# Patient Record
Sex: FEMALE | Race: WHITE | Employment: OTHER | URBAN - METROPOLITAN AREA
[De-identification: names, ages, dates, MRNs, and addresses within clinical notes are randomized per-mention and may not be internally consistent; named-entity substitution may affect disease eponyms.]

---

## 2021-08-12 PROBLEM — E55.9 VITAMIN D DEFICIENCY: Status: ACTIVE | Noted: 2021-08-12

## 2021-08-12 PROBLEM — E78.2 MIXED HYPERLIPIDEMIA: Status: ACTIVE | Noted: 2021-08-12

## 2021-08-12 PROBLEM — N39.0 RECURRENT UTI: Status: ACTIVE | Noted: 2021-08-12

## 2021-08-12 PROBLEM — E11.9 CONTROLLED TYPE 2 DIABETES MELLITUS WITHOUT COMPLICATION, WITHOUT LONG-TERM CURRENT USE OF INSULIN (HCC): Status: ACTIVE | Noted: 2021-08-12

## 2021-08-12 PROBLEM — K59.04 CHRONIC IDIOPATHIC CONSTIPATION: Status: ACTIVE | Noted: 2021-08-12

## 2021-08-12 PROBLEM — Z79.899 ENCOUNTER FOR LONG-TERM (CURRENT) USE OF MEDICATIONS: Status: ACTIVE | Noted: 2021-08-12

## 2021-10-05 PROBLEM — N65.1 BREAST ASYMMETRY FOLLOWING RECONSTRUCTIVE SURGERY: Status: ACTIVE | Noted: 2020-06-25

## 2021-10-05 PROBLEM — M51.26 DISPLACEMENT OF LUMBAR INTERVERTEBRAL DISC WITHOUT MYELOPATHY: Status: ACTIVE | Noted: 2021-10-05

## 2021-10-05 PROBLEM — Z98.890 HISTORY OF REDUCTION SURGERY OF LEFT BREAST: Status: ACTIVE | Noted: 2020-06-25

## 2021-10-05 PROBLEM — Z98.890 HISTORY OF LUMPECTOMY OF RIGHT BREAST: Status: ACTIVE | Noted: 2020-06-25

## 2021-10-05 PROBLEM — M51.26 DISC DISPLACEMENT, LUMBAR: Status: ACTIVE | Noted: 2021-10-05

## 2021-10-05 PROBLEM — N28.1 RENAL CYST: Status: ACTIVE | Noted: 2017-01-05

## 2022-01-06 PROBLEM — F51.01 PRIMARY INSOMNIA: Status: ACTIVE | Noted: 2022-01-06

## 2022-01-06 PROBLEM — I10 ESSENTIAL HYPERTENSION: Status: ACTIVE | Noted: 2022-01-06

## 2022-01-06 PROBLEM — K59.04 CHRONIC IDIOPATHIC CONSTIPATION: Status: RESOLVED | Noted: 2021-08-12 | Resolved: 2022-01-06

## 2022-01-06 PROBLEM — R74.01 ELEVATED ALT MEASUREMENT: Status: ACTIVE | Noted: 2022-01-06

## 2022-03-18 PROBLEM — F51.01 PRIMARY INSOMNIA: Status: ACTIVE | Noted: 2022-01-06

## 2022-03-18 PROBLEM — M51.26 DISC DISPLACEMENT, LUMBAR: Status: ACTIVE | Noted: 2021-10-05

## 2022-03-19 PROBLEM — R74.01 ELEVATED AST (SGOT): Status: ACTIVE | Noted: 2022-01-06

## 2022-03-19 PROBLEM — E78.2 MIXED HYPERLIPIDEMIA: Status: ACTIVE | Noted: 2021-08-12

## 2022-03-19 PROBLEM — M51.26 DISPLACEMENT OF LUMBAR INTERVERTEBRAL DISC WITHOUT MYELOPATHY: Status: ACTIVE | Noted: 2021-10-05

## 2022-03-19 PROBLEM — I10 ESSENTIAL HYPERTENSION: Status: ACTIVE | Noted: 2022-01-06

## 2022-03-19 PROBLEM — Z79.899 ENCOUNTER FOR LONG-TERM (CURRENT) USE OF MEDICATIONS: Status: ACTIVE | Noted: 2021-08-12

## 2022-03-19 PROBLEM — E11.9 CONTROLLED TYPE 2 DIABETES MELLITUS WITHOUT COMPLICATION, WITHOUT LONG-TERM CURRENT USE OF INSULIN (HCC): Status: ACTIVE | Noted: 2021-08-12

## 2022-03-19 PROBLEM — Z98.890 HISTORY OF REDUCTION SURGERY OF LEFT BREAST: Status: ACTIVE | Noted: 2020-06-25

## 2022-03-19 PROBLEM — N39.0 RECURRENT UTI: Status: ACTIVE | Noted: 2021-08-12

## 2022-03-19 PROBLEM — N28.1 RENAL CYST: Status: ACTIVE | Noted: 2017-01-05

## 2022-03-19 PROBLEM — N65.1 BREAST ASYMMETRY FOLLOWING RECONSTRUCTIVE SURGERY: Status: ACTIVE | Noted: 2020-06-25

## 2022-03-19 PROBLEM — E55.9 VITAMIN D DEFICIENCY: Status: ACTIVE | Noted: 2021-08-12

## 2022-03-19 PROBLEM — Z98.890 HISTORY OF LUMPECTOMY OF RIGHT BREAST: Status: ACTIVE | Noted: 2020-06-25

## 2022-03-29 PROBLEM — M25.511 CHRONIC RIGHT SHOULDER PAIN: Status: ACTIVE | Noted: 2022-03-29

## 2022-03-29 PROBLEM — Z90.10 HISTORY OF MASTECTOMY: Status: ACTIVE | Noted: 2021-06-04

## 2022-03-29 PROBLEM — G89.29 CHRONIC RIGHT SHOULDER PAIN: Status: ACTIVE | Noted: 2022-03-29

## 2022-03-29 PROBLEM — Z91.81 HISTORY OF BAD FALL: Status: ACTIVE | Noted: 2022-03-29

## 2022-04-14 ENCOUNTER — HOSPITAL ENCOUNTER (OUTPATIENT)
Dept: PHYSICAL THERAPY | Age: 62
Discharge: HOME | End: 2022-04-14
Payer: COMMERCIAL

## 2022-04-14 DIAGNOSIS — M75.01 ADHESIVE CAPSULITIS OF RIGHT SHOULDER: ICD-10-CM

## 2022-04-14 DIAGNOSIS — M25.511 RIGHT SHOULDER PAIN, UNSPECIFIED CHRONICITY: ICD-10-CM

## 2022-04-14 PROCEDURE — 97110 THERAPEUTIC EXERCISES: CPT

## 2022-04-14 PROCEDURE — 97140 MANUAL THERAPY 1/> REGIONS: CPT

## 2022-04-14 PROCEDURE — 97162 PT EVAL MOD COMPLEX 30 MIN: CPT

## 2022-04-14 NOTE — THERAPY EVALUATION
Jaxson Santos  : 1960  Primary: [facility] [employer]*  Secondary:  [facility]  [address] [place], [address] [place] [address], [place], [address] [place] [address]  [phone]   [phone]       OUTPATIENT PHYSICAL THERAPY:Initial Assessment 2022    ICD-10: Treatment Diagnosis: Pain in Right Shoulder (M25.511)              Treatment Diagnosis 2: Adhesive Capsulitis of Right Shoulder (M75.01)               Treatment Diagnosis 3: Strain of muscle(s) and tendon(s) of the rotator cuff of right Shoulder, sequela (S46.011S)                   Precautions: Patient has type 2 diabetes   Allergies: Patient has no known allergies. TREATMENT PLAN:  Effective Dates: 2022 TO 2022 (60 days). Frequency/Duration: 1 to 2 times a week for 60 Day(s) MEDICAL/REFERRING DIAGNOSIS:  Right shoulder pain, unspecified chronicity [M25.511]  Adhesive capsulitis of right shoulder [M75.01]   DATE OF ONSET: Patient reports onset of pain last fall around 2021  REFERRING PHYSICIAN: Tiana Ohara MD Orders: Evaluate and Treat   Return MD Appointment: Not scheduled at this time     INITIAL ASSESSMENT:  Ms. Gaston Moore is a 62 y.o. female presenting to physical therapy with complaints of R shoulder pain. 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 Patient reports she landed on her R shoulder causing instant mobility deficits and pain. Patient then met with orthopedic team which resulted in an injection on 22. Patient reports pain relief since injection but no improvement in ROM. Patient currently has pain with doing ADLs and anything requiring her to raise and lift her arm. Patient reports symptom relief following ice and heat.  Patient is R handed and reports difficulty performing ADLs such as getting dressed, brushing her teeth, eating, and washing her hair because of pain and limitations. Patient has hx of type 2 DM, HTN, and high cholesterol as well as a C2-C7 decompression surgery performed several years ago. She would like to improve function to improve ability to perform ADLs as well as return to gardening and navigating her home and community environment without pain or limitation. Patient presents with increased pain, decreased strength, decreased ROM, decreased flexibility, impaired posture, impaired overhead reach, impaired transfer ability, decreased activity tolerance, and overall impaired functional mobility. Patient is a good candidate for skilled physical therapy interventions to include manual therapy, therapeutic exercise, transfer training, postural re-education, body mechanics training, and pain modalities as needed to improve deficits and progress functional independence. PROBLEM LIST (Impacting functional limitations):  1. Decreased Strength  2. Decreased ADL/Functional Activities  3. Decreased Transfer Abilities  4. Increased Pain  5. Decreased Activity Tolerance  6. Decreased Work Simplification/Energy Conservation Techniques  7. Decreased Flexibility/Joint Mobility  8. Decreased [place] with Home Exercise Program INTERVENTIONS PLANNED: (Treatment may consist of any combination of the following)  1. Cold  2. Cryotherapy  3. Electrical Stimulation  4. Heat  5. Home Exercise Program (HEP)  6. Manual Therapy  7. Neuromuscular Re-education/Strengthening  8. Range of Motion (ROM)  9. Therapeutic Activites  10. Therapeutic Exercise/Strengthening  11. Transcutaneous Electrical Nerve Stimulation (TENS)  12. Transfer Training     GOALS: (Goals have been discussed and agreed upon with patient.)  Short-Term Functional Goals: Time Frame: 4/14/2022 to 5/12/2022  1.  Patient demonstrates independence with home exercise program without verbal cueing provided by therapist.   2. Patient will report no more than 2/10 R shoulder pain at rest in order to demonstrate improved self pain control and tolerance. 3. Patient will be educated in and demonstrate improved upright posture including decreased forward head and increased posterior tilt to scapula to improve sitting posture and mobility. 4. Patient will be able to raise R UE to 90 degrees in order to prepare food in her kitchen. Discharge Goals: Time Frame: 4/14/2022 to 6/13/2022  1. Patient will report no more than 0/10 R shoulder pain at rest in order to demonstrate improved self pain control and tolerance. 2. Patient will be able to demonstrate 150 degrees of shoulder flexion in order to raise and lift her arm to reach the upper cabinet in her kitchen. 3. Patient will demonstrate functional ER to C7 in order to wash the back of her head. 4. Patient will demonstrate 4+/5 bilat UE strength in order to raise and lift 20 pounds while gardening. 5. Patient will improve Disability of the Arm, Shoulder, and Hand score to under 20/55 from 38/55. Outcome Measure: Tool Used: Disabilities of the Arm, Shoulder and Hand (DASH) Questionnaire - Quick Version  Score:  Initial: 38/55  Most Recent: X/55 (Date: -- )   Interpretation of Score: The DASH is designed to measure the activities of daily living in person's with upper extremity dysfunction or pain. Each section is scored on a 1-5 scale, 5 representing the greatest disability. The scores of each section are added together for a total score of 55. Medical Necessity:   · Patient is expected to demonstrate progress in strength, range of motion, balance, coordination and functional technique to improve ability to perform ADLs as well as navigate home and community environment. · Skilled intervention continues to be required due to decreased activity tolerance, decreased ROM, decreased strength, and increased pain.   Reason for Services/Other Comments:  · Patient continues to require skilled intervention due to increasing difficulty of exercises within graded exercise program.  Total Evaluation Duration: 30 minutes    Rehabilitation Potential For Stated Goals: Good  Regarding Melina Ferrari's therapy, I certify that the treatment plan above will be carried out by a therapist or under their direction. Thank you for this referral,  Jorgito Martinez PT     Referring Physician Signature: MARY Esteves _______________________________ Date _____________             PAIN/SUBJECTIVE:    Initial: Pain Intensity 1: 3  Pain Location 1: Shoulder  Pain Orientation 1: Right  Post Session:  3/10    HISTORY:    History of Injury/Illness (Reason for Referral):  Ms. Nancy Harrison is a 62 y.o. female presenting to physical therapy with complaints of R shoulder pain. 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 Patient reports she landed on her R shoulder causing instant mobility deficits and pain. Patient then met with orthopedic team which resulted in an injection on 4/12/22. Patient reports pain relief since injection but no improvement in ROM. Patient currently has pain with doing ADLs and anything requiring her to raise and lift her arm. Patient reports symptom relief following ice and heat. Patient is R handed and reports difficulty performing ADLs such as getting dressed, brushing her teeth, eating, and washing her hair because of pain and limitations. Patient has hx of type 2 DM, HTN, and high cholesterol as well as a C2-C7 decompression surgery performed several years ago. She would like to improve function to improve ability to perform ADLs as well as return to gardening and navigating her home and community environment without pain or limitation. Past Medical History/Comorbidities:   Ms. Nancy Harrison  has no past medical history on file.   Ms. Roseann Galindo  has a past surgical history that includes hx lumbar diskectomy (2008); hx cervical laminectomy (2016); hx breast lumpectomy (Right, 2009); hx bilateral mastectomy (2020); hx breast reconstruction (2020); bshsi pb gastric bypass sp (2017); and hx other surgical (Bilateral, 2008). Social History/Living Environment:     Patient lives with family and is currently retired. Prior Level of Function/Work/Activity:  Patient reports independence and no difficulty with ADLs as well as ability to garden and walk her dogs regularly without pain or dysfunction. Dominant Side:         RIGHT    Ambulatory/Rehab Services H2 Model Falls Risk Assessment    Risk Factors:       No Risk Factors Identified Ability to Rise from Chair:       (0)  Ability to rise in a single movement    Falls Prevention Plan:       No modifications necessary    Total: (5 or greater = High Risk): 0    ©2010 [facility]. All Rights Reserved. [facility] Patent #[id number]. Federal Law prohibits the replication, distribution or use without written permission from [facility] [employer]    Current Medications:        Current Outpatient Medications:     traMADoL (ULTRAM) 50 mg tablet, Take 1 Tablet by mouth every six (6) hours as needed for Pain for up to 30 days. Max Daily Amount: 200 mg. Indications: shoulder pain, Disp: 60 Tablet, Rfl: 3    metFORMIN (GLUCOPHAGE) 1,000 mg tablet, Take 1 Tablet by mouth two (2) times daily (with meals). Indications: type 2 diabetes mellitus, Disp: 60 Tablet, Rfl: 5    metoprolol succinate (TOPROL-XL) 50 mg XL tablet, Take 1 Tablet by mouth daily. Indications: high blood pressure, Disp: 30 Tablet, Rfl: 5    ezetimibe (Zetia) 10 mg tablet, Take 1 Tablet by mouth daily. , Disp: 30 Tablet, Rfl: 5    SITagliptin (Januvia) 100 mg tablet, Take 1 Tablet by mouth daily.  Indications: type 2 diabetes mellitus, Disp: 30 Tablet, Rfl: 5    temazepam (RESTORIL) 15 mg capsule, Take 1 Capsule by mouth nightly as needed for Sleep. Max Daily Amount: 15 mg., Disp: 30 Capsule, Rfl: 3    cholecalciferol (Vitamin D3) 25 mcg (1,000 unit) cap, Take  by mouth daily. Indications: prevention of vitamin D deficiency, Disp: 30 Capsule, Rfl: 5    docusate sodium (COLACE) 100 mg capsule, Take 1 Capsule by mouth two (2) times a day. Indications: constipation, Disp: 60 Capsule, Rfl: 2    Date Last Reviewed:  4/14/2022    Number of Personal Factors/Comorbidities that affect the Plan of Care:  Patient is moderate complexity based on pmh, dominant hand, and PLOF. 1-2: MODERATE COMPLEXITY    EXAMINATION:    Patient denies  any headaches, changes in vision, dizziness, vertigo, nausea, drop attacks, black outs, tinnitus, dysphagia, dysarthria, LE symptoms or bowel/bladder dysfunction. Observation/Orthostatic Postural Assessment:          Patient sits and stands with forward head and shoulder in protected position of internal rotation and elbow flexion. Palpation:          Patient diffusely tender through R shoulder and R cervical region. ROM:          WFL: within functional limits         NT: Not tested   AROM/ PROM Left (degrees) Right (degrees)   Shoulder Flexion 180 55   Shoulder Abduction 176 28   Shoulder Internal Rotation  82 50   Functional Internal Rotation T4-6 Greater trochanter   Shoulder External Rotation 88 20   Functional External Rotation C7 NT secondary to pain     Strength: Motion Tested Left   (*/5) Right  (*/5)   Shoulder Flexion 4+ 2   Scaption 4+ 2   Shoulder Abduction 4+ 2   Shoulder Internal Rotation  4+ 2   Shoulder External Rotation 4 2   Shoulder Internal Rotation   (90 abduction) 4+ NT secondary to pain   Shoulder External Rotation   (90 abduction) 4 NT secondary to pain   Elbow Flexion 4+ 3   Elbow Extension 4+ 3   Wrist Flexion 5 4   Wrist Extension 5 4     Special Tests:  Not performed today secondary to irritability and pain with motion.    Passive Accessory Motion:         Decreased and painful global R UE movement. Neurological Screen:              Myotomes: Key muscle strength testing through bilateral UE is [place] but painful on R UE. Dermatomes: Sensation to light touch for bilateral UE is intact and WFL. Reflexes:          Biceps (C5): 2         Brachioradialis (C6): 2        Triceps (C7): 2  Abnormal reflexes:  Rose: negative  Neural tension tests: Upper limb tension test is NT secondary to pain. Functional Mobility:         Overhead reach: unable to perform secondary to pain. Balance:          NT secondary to nature of visit. Body Structures Involved:  1. Bones  2. Joints  3. Muscles  4. Ligaments Body Functions Affected:  1. Sensory/Pain  2. Neuromusculoskeletal  3. Movement Related Activities and Participation Affected:  1. Learning and Applying Knowledge  2. General Tasks and Demands  3. Mobility  4. Self Care  5. Domestic Life  6. Interpersonal Interactions and Relationships  7.  Community, Social and [place]    Number of elements (examined above) that affect the Plan of Care: 3: MODERATE COMPLEXITY    CLINICAL PRESENTATION:    Presentation:   Evolving clinical presentation with changing clinical characteristics: MODERATE COMPLEXITY    CLINICAL DECISION MAKING:    Use of outcome tool(s) and clinical judgement create a POC that gives a: Questionable prediction of patient's progress: MODERATE COMPLEXITY

## 2022-04-14 NOTE — PROGRESS NOTES
Jaxson Santos  : 1960  Primary: [facility] [employer]*  Secondary:  [facility]  [address], [address] [place], [place], [address]  [phone]   [phone]      OUTPATIENT PHYSICAL THERAPY: Daily Treatment Note 2022    ICD-10: Treatment Diagnosis: Pain in Right Shoulder (M25.511)              Treatment Diagnosis 2: Adhesive Capsulitis of Right Shoulder (M75.01)               Treatment Diagnosis 3: Strain of muscle(s) and tendon(s) of the rotator cuff of right Shoulder, sequela (S46.011S)                   Precautions: Patient has type 2 diabetes   Allergies: Patient has no known allergies. TREATMENT PLAN:  Effective Dates: 2022 TO 2022 (60 days). Frequency/Duration: 1 to 2 times a week for 60 Day(s) MEDICAL/REFERRING DIAGNOSIS:  Right shoulder pain, unspecified chronicity [M25.511]  Adhesive capsulitis of right shoulder [M75.01]   DATE OF ONSET: Patient reports onset of pain last  around 2021  REFERRING PHYSICIAN: Tiana Ohara MD Orders: Evaluate and Treat   Return MD Appointment: Not scheduled at this time     Pre-treatment Symptoms/Complaints:  Patient was agreeable to PT and optimistic that she can get relief and improve function. Pain: Initial: Pain Intensity 1: 3  Pain Location 1: Shoulder  Pain Orientation 1: Right  Post Session:  3/10   Medications Last Reviewed:  2022  Updated Objective Findings:  See evaluation note from today   TREATMENT:   THERAPEUTIC EXERCISE: (15 minutes):  Exercises per grid below to improve mobility, strength, balance and coordination. Required minimal visual, verbal, manual and tactile cues to promote proper body alignment, promote proper body posture, promote proper body mechanics and promote proper body breathing techniques. Progressed resistance, range, repetitions and complexity of movement as indicated.      Date:  2022 Date:   Date:     Activity/Exercise Parameters Parameters Parameters   AAROM Shoulder 2x10 supine hands clasped flexion    2x5 holding 5 sec ER with cane     Scapular Retraction 2x10     Ball Rollout 2x10 blue ball seated     Mid Row 2x10 red band standing      PROM 5 minutes into flexion and external rotation                          Time spent with patient reviewing proper muscle recruitment and technique with exercises. MANUAL THERAPY: (15 minutes minutes): Joint mobilization and Soft tissue mobilization was utilized and necessary because of the patient's restricted joint motion and painful spasm   Soft tissue mobilization to upper trap and posterior shoulder cabsule   Gentle distraction at glenohumeral joint     MODALITIES: (0 minutes):      None today     HEP: As above; handouts given to patient for all exercises. Treatment/Session Summary:    · Response to Treatment:  Patient was agreeable and able to verbalize and demonstrate proffiiciency from HEP. · Baseline vitals (4/14/2022): Not assessed today  · Communication/Consultation:  Discussed HEP and POC with patient  · Equipment provided today:  Green theraband for rows   · Recommendations/Intent for next treatment session: Next visit will focus on graded exercise program to improve activity tolerance and function of R UE.     Total Treatment Billable Duration:  60 minutes: 30 evaluation, 15 minutes therapeutic exercise, 15 minutes manual intervention  PT Patient Time In/Time Out  Time In: 1100  Time Out: [address], PT

## 2022-04-19 ENCOUNTER — HOSPITAL ENCOUNTER (OUTPATIENT)
Dept: PHYSICAL THERAPY | Age: 62
Discharge: HOME | End: 2022-04-19
Payer: COMMERCIAL

## 2022-04-19 PROCEDURE — 97140 MANUAL THERAPY 1/> REGIONS: CPT

## 2022-04-19 PROCEDURE — 97110 THERAPEUTIC EXERCISES: CPT

## 2022-04-19 NOTE — PROGRESS NOTES
Deric Miramontes  : 1960  Primary: [facility] [employer]*  Secondary:  [facility] at [facility]  [address] [place], [address] [place] [address], [place], [address] [place] [address]  [phone]   [phone]      OUTPATIENT PHYSICAL THERAPY: Daily Treatment Note 2022    ICD-10: Treatment Diagnosis: Pain in Right Shoulder (M25.511)              Treatment Diagnosis 2: Adhesive Capsulitis of Right Shoulder (M75.01)               Treatment Diagnosis 3: Strain of muscle(s) and tendon(s) of the rotator cuff of right Shoulder, sequela (S46.011S)                   Precautions: Patient has type 2 diabetes   Allergies: Patient has no known allergies. TREATMENT PLAN:  Effective Dates: 2022 TO 2022 (60 days). Frequency/Duration: 1 to 2 times a week for 60 Day(s) MEDICAL/REFERRING DIAGNOSIS:  Right shoulder pain, unspecified chronicity [M25.511]  Adhesive capsulitis of right shoulder [M75.01]   DATE OF ONSET: Patient reports onset of pain last  around 2021  REFERRING PHYSICIAN: Tiana Pool MD Orders: Evaluate and Treat   Return MD Appointment: Not scheduled at this time     Pre-treatment Symptoms/Complaints: Pt. Reported feeling a lot better since starting therapy. Pain: Initial: Pain Intensity 1: 3  Pain Location 1: Shoulder  Pain Orientation 1: Right  Post Session:  3/10   Medications Last Reviewed:  2022  Updated Objective Findings:  Pt. restricted in shoulder flexion, abduction, and external rotation   TREATMENT:   THERAPEUTIC EXERCISE: (30 minutes):  Exercises per grid below to improve mobility, strength, balance and coordination. Required minimal visual, verbal, manual and tactile cues to promote proper body alignment, promote proper body posture, promote proper body mechanics and promote proper body breathing techniques. Progressed resistance, range, repetitions and complexity of movement as indicated.      Date:  2022 Date:  22 Date: Activity/Exercise Parameters Parameters Parameters   AAROM Shoulder 2x10 supine hands clasped flexion    2x5 holding 5 sec ER with cane 2x10 reps with cane flexion & external rotation     Scapular Retraction 2x10 2x10     Ball Rollout 2x10 blue ball seated 2x10 blue ball     Mid Row 2x10 red band standing  -----    PROM 5 minutes into flexion and external rotation  x5 mins into flexion & external rotation    UBE   X 3 mins fwd & 3 mins bkwd level 2     Wall pulley  X 5 mins flexion and scaption    Walk outs   From wall traction x 3 mins     Shoulder shrugs  X 20 reps     Backward shoulder rolls  X 20 reps       Time spent with patient reviewing proper muscle recruitment and technique with exercises. MANUAL THERAPY: (25 minutes minutes): Joint mobilization and Soft tissue mobilization was utilized and necessary because of the patient's restricted joint motion and painful spasm   Soft tissue mobilization to upper trap and posterior shoulder capsule   Gentle distraction at glenohumeral joint    Left sidelying for right scapular mobs    MODALITIES: (10  minutes):      Pt. received 5 mins of moist heat initially to warm up and relax tight muscles. Pt. also received ice pack to right should in supine x 5 mins at the end of session to herlp with soreness after session      HEP: As above; handouts given to patient for all exercises. Treatment/Session Summary:    · Response to Treatment:  Pt. was compliant with exercises and reported being sore from session but stated the ice pack helped. .  · Baseline vitals (4/14/2022): Not assessed today  · Communication/Consultation:  Discussed HEP and POC with patient  · Equipment provided today:  Wall pulley issued for home use. · Recommendations/Intent for next treatment session: Next visit will focus on graded exercise program to improve activity tolerance and function of R UE.     Total Treatment Billable Duration:  55 mins   PT Patient Time In/Time Out  Time In: 0800  Time Out: 0905  Blanquita Vargas, PTA

## 2022-04-21 ENCOUNTER — HOSPITAL ENCOUNTER (OUTPATIENT)
Dept: PHYSICAL THERAPY | Age: 62
Discharge: HOME | End: 2022-04-21
Payer: COMMERCIAL

## 2022-04-21 PROCEDURE — 97140 MANUAL THERAPY 1/> REGIONS: CPT

## 2022-04-21 PROCEDURE — 97110 THERAPEUTIC EXERCISES: CPT

## 2022-04-21 NOTE — PROGRESS NOTES
Storm Litten  : 1960  Primary: [facility] [employer]*  Secondary:  [facility]rapy Center at [facility]  [address], [address] [place] [address] Avenue, [place], [address] [place] [address]  [phone]   [phone]      OUTPATIENT PHYSICAL THERAPY: Daily Treatment Note 2022    ICD-10: Treatment Diagnosis: Pain in Right Shoulder (M25.511)              Treatment Diagnosis 2: Adhesive Capsulitis of Right Shoulder (M75.01)               Treatment Diagnosis 3: Strain of muscle(s) and tendon(s) of the rotator cuff of right Shoulder, sequela (S46.011S)                   Precautions: Patient has type 2 diabetes   Allergies: Patient has no known allergies. TREATMENT PLAN:  Effective Dates: 2022 TO 2022 (60 days). Frequency/Duration: 1 to 2 times a week for 60 Day(s) MEDICAL/REFERRING DIAGNOSIS:  Right shoulder pain, unspecified chronicity [M25.511]  Adhesive capsulitis of right shoulder [M75.01]   DATE OF ONSET: Patient reports onset of pain last fall around 2021  REFERRING PHYSICIAN: Tiana Bryant MD Orders: Evaluate and Treat   Return MD Appointment: Not scheduled at this time     Pre-treatment Symptoms/Complaints: Pt. Reported feeling a little sore from exercises. Pain: Initial: Pain Intensity 1: 3  Pain Location 1: Shoulder  Pain Orientation 1: Right  Post Session:  4/10 a little sore from exercises    Medications Last Reviewed:  2022  Updated Objective Findings:  Pt. able to perform exercises with increased shoulder range. TREATMENT:   THERAPEUTIC EXERCISE: (40 minutes):  Exercises per grid below to improve mobility, strength, balance and coordination. Required minimal visual, verbal, manual and tactile cues to promote proper body alignment, promote proper body posture, promote proper body mechanics and promote proper body breathing techniques. Progressed resistance, range, repetitions and complexity of movement as indicated.      Date:  2022 Date:  22 Date:  4/21/22   Activity/Exercise Parameters Parameters Parameters   AAROM Shoulder 2x10 supine hands clasped flexion    2x5 holding 5 sec ER with cane 2x10 reps with cane flexion & external rotation  2 x10 reps with cane flexion, abduction, & external rotation with cane   Scapular Retraction 2x10 2x10  3x10    Ball Rollout 2x10 blue ball seated 2x10 blue ball  2x10 reps blue ball    Mid Row 2x10 red band standing  ----- 3x10 reps red band standing   PROM 5 minutes into flexion and external rotation  x5 mins into flexion & external rotation X 5 mins into flexion & external rotation   UBE   X 3 mins fwd & 3 mins bkwd level 2  X 5 mins fwd. & 5 mins backwards level 2    Wall pulley  X 5 mins flexion and scaption X 5 mins flexion & abduction    Walk outs   From wall traction x 3 mins  From wall traction x 3 mins    Shoulder shrugs  X 20 reps  X 20 reps    Backward shoulder rolls  X 20 reps  X 20 reps    Wall slides    With small towel flexion and CW & CCW   Pendulum exercises    X 20 reps each way                  Time spent with patient reviewing proper muscle recruitment and technique with exercises. MANUAL THERAPY: (15 minutes): Joint mobilization and Soft tissue mobilization was utilized and necessary because of the patient's restricted joint motion and painful spasm   Soft tissue mobilization to upper trap and posterior shoulder capsule   Gentle distraction at glenohumeral joint     MODALITIES: (10  minutes):      Pt. received ice pack to right shoulder in supine to decrease pain and tighness     HEP: As above; handouts given to patient for all exercises. Treatment/Session Summary:    · Response to Treatment:  Pt. compliant with all exercises and felt better after ice. · Baseline vitals (4/14/2022): Not assessed today  · Communication/Consultation:  Discussed HEP and POC with patient  · Equipment provided today:  Wall pulley issued for home use.     · Recommendations/Intent for next treatment session: Next visit will focus on graded exercise program to improve activity tolerance and function of R UE.     Total Treatment Billable Duration:  55 mins   PT Patient Time In/Time Out  Time In: 0800  Time Out: 0905  Trang Schroeder PTA

## 2022-04-26 ENCOUNTER — HOSPITAL ENCOUNTER (OUTPATIENT)
Dept: PHYSICAL THERAPY | Age: 62
Discharge: HOME | End: 2022-04-26
Payer: COMMERCIAL

## 2022-04-26 PROCEDURE — 97140 MANUAL THERAPY 1/> REGIONS: CPT

## 2022-04-26 PROCEDURE — 97110 THERAPEUTIC EXERCISES: CPT

## 2022-04-26 NOTE — PROGRESS NOTES
Alfred Aguilera  : 1960  Primary: [facility] [employer]*  Secondary:  Therapy Center at [facility]  [address], [address], [place], [address]  [phone]   [phone]      OUTPATIENT PHYSICAL THERAPY: Daily Treatment Note 2022    ICD-10: Treatment Diagnosis: Pain in Right Shoulder (M25.511)              Treatment Diagnosis 2: Adhesive Capsulitis of Right Shoulder (M75.01)               Treatment Diagnosis 3: Strain of muscle(s) and tendon(s) of the rotator cuff of right Shoulder, sequela (S46.011S)                   Precautions: Patient has type 2 diabetes   Allergies: Patient has no known allergies. TREATMENT PLAN:  Effective Dates: 2022 TO 2022 (60 days). Frequency/Duration: 1 to 2 times a week for 60 Day(s) MEDICAL/REFERRING DIAGNOSIS:  Right shoulder pain, unspecified chronicity [M25.511]  Adhesive capsulitis of right shoulder [M75.01]   DATE OF ONSET: Patient reports onset of pain last  around 2021  REFERRING PHYSICIAN: Tiana Leija MD Orders: Evaluate and Treat   Return MD Appointment: Not scheduled at this time     Pre-treatment Symptoms/Complaints: Patient reports a little tenderness today but reports doing some increased landscaping over the last few days. Pain: Initial: Pain Intensity 1: 4  Pain Location 1: Shoulder  Pain Orientation 1: Right  Post Session:  4/10 a little sore from exercises    Medications Last Reviewed:  2022  Updated Objective Findings:  Patient has discomfort and sense of tightness with ER beyond 20 degrees. TREATMENT:   THERAPEUTIC EXERCISE: (43 minutes):  Exercises per grid below to improve mobility, strength, balance and coordination. Required minimal visual, verbal, manual and tactile cues to promote proper body alignment, promote proper body posture, promote proper body mechanics and promote proper body breathing techniques.   Progressed resistance, range, repetitions and complexity of movement as indicated. Date:  4/26/22 Date:  4/19/22 Date:  4/21/22   Activity/Exercise Parameters Parameters Parameters   AAROM Shoulder 2x15 hands clasped supine punch with flexion moment    2x5 supine punch hands touching emphasis on using R UE    2x10 holding 5 sec ER with cane 2x10 reps with cane flexion & external rotation  2 x10 reps with cane flexion, abduction, & external rotation with cane   Contract/Relax 2x12 flexion PROM into active extension supine     Scapular Retraction 2x10 2x10  3x10    Open Book 2x10 sidelying     Ball Rollout 2x10 blue ball seated 2x10 blue ball  2x10 reps blue ball    Mid Row 3x10 red band standing  ----- 3x10 reps red band standing   PROM 10 minutes into flexion and external rotation  x5 mins into flexion & external rotation X 5 mins into flexion & external rotation   UBE  3 min forward 3 min backward level 2.5 X 3 mins fwd & 3 mins bkwd level 2  X 5 mins fwd. & 5 mins backwards level 2    Wall pulley X 5 mins flexion & abduction  X 5 mins flexion and scaption X 5 mins flexion & abduction    Walk outs  From wall traction x 3 mins  From wall traction x 3 mins  From wall traction x 3 mins    Shoulder shrugs x20 reps X 20 reps  X 20 reps    Backward shoulder rolls  X 20 reps  X 20 reps    Wall slides  With small towel flexion and CW & CCW  With small towel flexion and CW & CCW   Pendulum exercises  X 20 reps each way   X 20 reps each way                  Time spent with patient reviewing proper muscle recruitment and technique with exercises. MANUAL THERAPY: (15 minutes): Joint mobilization and Soft tissue mobilization was utilized and necessary because of the patient's restricted joint motion and painful spasm   Soft tissue mobilization to upper trap and posterior shoulder capsule   Gentle distraction at glenohumeral joint     MODALITIES: (  minutes):      Not today     HEP: As above; handouts given to patient for all exercises.     Treatment/Session Summary:    · Response to Treatment:  Added more flexion work with AAROM supine punch and contract relax work. Patient able to demonstrate improved flexion ROM throughout session but fatigued by the end. · Baseline vitals (4/14/2022): Not assessed today  · Communication/Consultation:  Discussed HEP and POC with patient  · Equipment provided today:  Wall pulley issued for home use. · Recommendations/Intent for next treatment session: Next visit will focus on graded exercise program to improve activity tolerance and function of R UE.     Total Treatment Billable Duration:  58 mins   PT Patient Time In/Time Out  Time In: 0800  Time Out: 0900  Kasey Hutchinson PT

## 2022-04-28 ENCOUNTER — HOSPITAL ENCOUNTER (OUTPATIENT)
Dept: PHYSICAL THERAPY | Age: 62
Discharge: HOME | End: 2022-04-28
Payer: COMMERCIAL

## 2022-04-28 PROCEDURE — 97140 MANUAL THERAPY 1/> REGIONS: CPT

## 2022-04-28 PROCEDURE — 97110 THERAPEUTIC EXERCISES: CPT

## 2022-04-28 NOTE — PROGRESS NOTES
Callie Hidalgo  : 1960  Primary: [place] [facility] [employer]*  Secondary:  [facility]rapy Center at [facility]  [address], Suite [facility], [address]  [phone]   [phone]      OUTPATIENT PHYSICAL THERAPY: Daily Treatment Note 2022    ICD-10: Treatment Diagnosis: Pain in Right Shoulder (M25.511)              Treatment Diagnosis 2: Adhesive Capsulitis of Right Shoulder (M75.01)               Treatment Diagnosis 3: Strain of muscle(s) and tendon(s) of the rotator cuff of right Shoulder, sequela (S46.011S)                   Precautions: Patient has type 2 diabetes   Allergies: Patient has no known allergies. TREATMENT PLAN:  Effective Dates: 2022 TO 2022 (60 days). Frequency/Duration: 1 to 2 times a week for 60 Day(s) MEDICAL/REFERRING DIAGNOSIS:  Right shoulder pain, unspecified chronicity [M25.511]  Adhesive capsulitis of right shoulder [M75.01]   DATE OF ONSET: Patient reports onset of pain last fall around 2021  REFERRING PHYSICIAN: Caprice Eisenmenger, [place] MD Orders: Evaluate and Treat   Return MD Appointment: Not scheduled at this time     Pre-treatment Symptoms/Complaints: Patient reports the shoulder still feels a little stiff. Pain: Initial: Pain Intensity 1: 3  Pain Location 1: Shoulder  Pain Orientation 1: Right  Post Session:  4/10 a little sore from exercises    Medications Last Reviewed:  2022  Updated Objective Findings:  Patient has pain with external rotation at 20 degrees and painful arc   TREATMENT:   THERAPEUTIC EXERCISE: (44 minutes):  Exercises per grid below to improve mobility, strength, balance and coordination. Required minimal visual, verbal, manual and tactile cues to promote proper body alignment, promote proper body posture, promote proper body mechanics and promote proper body breathing techniques. Progressed resistance, range, repetitions and complexity of movement as indicated.      Date:  22 Date:  4/28/22 Date:  4/21/22   Activity/Exercise Parameters Parameters Parameters   AAROM Shoulder 2x15 hands clasped supine punch with flexion moment    2x5 supine punch hands touching emphasis on using R UE    2x10 holding 5 sec ER with cane 2x15 AAROM flexion with cane    2x15 supine punch hands touching emphasis on using R UE    2x10 holding 5 sec ER with cane 2 x10 reps with cane flexion, abduction, & external rotation with cane   Contract/Relax 2x12 flexion PROM into active extension supine 2x12 flexion PROM into active extension supine    Scapular Retraction 2x10 2x10  3x10    Open Book 2x10 sidelying Held secondary to pain    Ball Rollout 2x10 blue ball seated  2x10 reps blue ball    Mid Row 3x10 red band standing  3x15 green standing  3x10 reps red band standing   PROM 10 minutes into flexion and external rotation  10 minutes into flexion and external rotation X 5 mins into flexion & external rotation   UBE  3 min forward 3 min backward level 2.5 x5 min forward level 2.0 X 5 mins fwd. & 5 mins backwards level 2    Wall pulley X 5 mins flexion & abduction   X 5 mins flexion & abduction    Walk outs  From wall traction x 3 mins  3x15 with traction moment at wall From wall traction x 3 mins    Shoulder shrugs x20 reps X 20 reps  X 20 reps    Backward shoulder rolls   X 20 reps    Wall slides  With small towel flexion and CW & CCW With small towel flexion and CW & CCW With small towel flexion and CW & CCW   Pendulum exercises  X 20 reps each way  3 X 20 reps each way  X 20 reps each way    Shoulder Abduction  2x10 AROM            Time spent with patient reviewing proper muscle recruitment and technique with exercises.      MANUAL THERAPY: (12 minutes): Joint mobilization and Soft tissue mobilization was utilized and necessary because of the patient's restricted joint motion and painful spasm   Soft tissue mobilization to upper trap and posterior shoulder capsule   Gentle distraction at glenohumeral joint MODALITIES: (  minutes):      Not today     HEP: As above; handouts given to patient for all exercises. Treatment/Session Summary:    · Response to Treatment:  Patient tolerated session well and reported feeling more \"loose\" following session. Added AROM shoulder abduction and increased volume and resistance of periscapular strengthening. · Baseline vitals (4/14/2022): Not assessed today  · Communication/Consultation:  Discussed HEP and POC with patient  · Equipment provided today:  Wall pulley issued for home use. · Recommendations/Intent for next treatment session: Next visit will focus on graded exercise program to improve activity tolerance and function of R UE.     Total Treatment Billable Duration:  56 mins   PT Patient Time In/Time Out  Time In: 0900  Time Out: MARGARITA Mobley

## 2022-05-03 ENCOUNTER — HOSPITAL ENCOUNTER (OUTPATIENT)
Dept: PHYSICAL THERAPY | Age: 62
Discharge: HOME | End: 2022-05-03
Payer: COMMERCIAL

## 2022-05-03 PROCEDURE — 97110 THERAPEUTIC EXERCISES: CPT

## 2022-05-03 PROCEDURE — 97140 MANUAL THERAPY 1/> REGIONS: CPT

## 2022-05-03 NOTE — PROGRESS NOTES
Brynn Ohs  : 1960  Primary: [facility] [employer]*  Secondary:  Therapy Center at [facility]  [address], [address], [place], [address]  [phone]   [phone]      OUTPATIENT PHYSICAL THERAPY: Daily Treatment Note 5/3/2022    ICD-10: Treatment Diagnosis: Pain in Right Shoulder (M25.511)              Treatment Diagnosis 2: Adhesive Capsulitis of Right Shoulder (M75.01)               Treatment Diagnosis 3: Strain of muscle(s) and tendon(s) of the rotator cuff of right Shoulder, sequela (S46.011S)                   Precautions: Patient has type 2 diabetes   Allergies: Patient has no known allergies. TREATMENT PLAN:  Effective Dates: 2022 TO 2022 (60 days). Frequency/Duration: 1 to 2 times a week for 60 Day(s) MEDICAL/REFERRING DIAGNOSIS:  Right shoulder pain, unspecified chronicity [M25.511]  Adhesive capsulitis of right shoulder [M75.01]   DATE OF ONSET: Patient reports onset of pain last fall around 2021  REFERRING PHYSICIAN: Tiana Ramirez MD Orders: Evaluate and Treat   Return MD Appointment: Not scheduled at this time      Pre-treatment Symptoms/Complaints: Patient reported increased ability to do things like pulling her pants up. Pain: Initial: Pain Intensity 1: 2  Pain Location 1: Shoulder  Pain Orientation 1: Right  Post Session:  4/10 a little sore from exercises    Medications Last Reviewed:  5/3/2022  Updated Objective Findings:  Pt. continues to demonstrate increase AAROM in right shoulder   TREATMENT:   THERAPEUTIC EXERCISE: (45  minutes):  Exercises per grid below to improve mobility, strength, balance and coordination. Required minimal visual, verbal, manual and tactile cues to promote proper body alignment, promote proper body posture, promote proper body mechanics and promote proper body breathing techniques. Progressed resistance, range, repetitions and complexity of movement as indicated. Date:  4/26/22 Date:  4/28/22 Date:  5/3/22   Activity/Exercise Parameters Parameters Parameters   AAROM Shoulder 2x15 hands clasped supine punch with flexion moment    2x5 supine punch hands touching emphasis on using R UE    2x10 holding 5 sec ER with cane 2x15 AAROM flexion with cane    2x15 supine punch hands touching emphasis on using R UE    2x10 holding 5 sec ER with cane 2 x10 reps with cane flexion, abduction, & external rotation with cane   Contract/Relax 2x12 flexion PROM into active extension supine 2x12 flexion PROM into active extension supine ---------   Scapular Retraction 2x10 2x10  3x10    Open Book 2x10 sidelying Held secondary to pain    Ball Rollout 2x10 blue ball seated  2x10 reps blue ball    Mid Row 3x10 red band standing  3x15 green standing  3x15 reps red band standing   PROM 10 minutes into flexion and external rotation  10 minutes into flexion and external rotation X 5 mins into flexion & external rotation   UBE  3 min forward 3 min backward level 2.5 x5 min forward level 2.0 X 5 mins fwd. & 5 mins backwards level 3   Wall pulley X 5 mins flexion & abduction   X 5 mins flexion & abduction    Walk outs  From wall traction x 3 mins  3x15 with traction moment at wall From wall traction x 3 x 15 reps    Shoulder shrugs x20 reps X 20 reps  X 20 reps    Backward shoulder rolls   X 20 reps    Wall slides  With small towel flexion and CW & CCW With small towel flexion and CW & CCW With small towel flexion and CW & CCW   Pendulum exercises  X 20 reps each way  3 X 20 reps each way  X 20 reps each way    Shoulder Abduction  2x10 AROM 2x10 reps AROM            Time spent with patient reviewing proper muscle recruitment and technique with exercises.      MANUAL THERAPY: (10 minutes): Joint mobilization and Soft tissue mobilization was utilized and necessary because of the patient's restricted joint motion and painful spasm   Soft tissue mobilization to upper trap and posterior shoulder capsule   Gentle distraction at glenohumeral joint     MODALITIES: (10   minutes):      Pt. received ice pack to right shoulder to decrease pain and tightness. HEP: As above; handouts given to patient for all exercises. Treatment/Session Summary:    · Response to Treatment:  Patient reported increased shoulder mobility and soreness reported after session. · Baseline vitals (4/14/2022): Not assessed today  · Communication/Consultation:  Discussed HEP and POC with patient  · Equipment provided today:  Wall pulley issued for home use. · Recommendations/Intent for next treatment session: Next visit will focus on graded exercise program to improve activity tolerance and function of R UE.     Total Treatment Billable Duration:  55 mins   PT Patient Time In/Time Out  Time In: 0900  Time Out: [address] [facility], \[facility] [place] [facility]\""

## 2022-05-05 ENCOUNTER — HOSPITAL ENCOUNTER (OUTPATIENT)
Dept: PHYSICAL THERAPY | Age: 62
Discharge: HOME | End: 2022-05-05
Payer: COMMERCIAL

## 2022-05-05 PROCEDURE — 97110 THERAPEUTIC EXERCISES: CPT

## 2022-05-05 PROCEDURE — 97140 MANUAL THERAPY 1/> REGIONS: CPT

## 2022-05-05 NOTE — PROGRESS NOTES
Luwanna Bamberger  : 1960  Primary: [facility] [employer]*  Secondary:  Therapy Center at [facility]  [address], [address], [place], [address]  [phone]   [phone]      OUTPATIENT PHYSICAL THERAPY: Daily Treatment Note 2022    ICD-10: Treatment Diagnosis: Pain in Right Shoulder (M25.511)              Treatment Diagnosis 2: Adhesive Capsulitis of Right Shoulder (M75.01)               Treatment Diagnosis 3: Strain of muscle(s) and tendon(s) of the rotator cuff of right Shoulder, sequela (S46.011S)                   Precautions: Patient has type 2 diabetes   Allergies: Patient has no known allergies. TREATMENT PLAN:  Effective Dates: 2022 TO 2022 (60 days). Frequency/Duration: 1 to 2 times a week for 60 Day(s) MEDICAL/REFERRING DIAGNOSIS:  Right shoulder pain, unspecified chronicity [M25.511]  Adhesive capsulitis of right shoulder [M75.01]   DATE OF ONSET: Patient reports onset of pain last  around 2021  REFERRING PHYSICIAN: Susan Parmar, [id number] MD Orders: Evaluate and Treat   Return MD Appointment: Not scheduled at this time      Pre-treatment Symptoms/Complaints: Patient feels like shoulder continues to improve. Pain: Initial: Pain Intensity 1: 0  Pain Location 1: Shoulder  Pain Orientation 1: Right  Post Session:  2/10 a little sore from exercises    Medications Last Reviewed:  2022  Updated Objective Findings:  Patient has some tenderness in cervical paraspinals. TREATMENT:   THERAPEUTIC EXERCISE: (45  minutes):  Exercises per grid below to improve mobility, strength, balance and coordination. Required minimal visual, verbal, manual and tactile cues to promote proper body alignment, promote proper body posture, promote proper body mechanics and promote proper body breathing techniques. Progressed resistance, range, repetitions and complexity of movement as indicated.      Date:  2022 Date:  22 Date:  5/3/22   Activity/Exercise Parameters Parameters Parameters   AAROM Shoulder 2 x10 reps with cane flexion, abduction, & external rotation with cane 2x15 AAROM flexion with cane    2x15 supine punch hands touching emphasis on using R UE    2x10 holding 5 sec ER with cane 2 x10 reps with cane flexion, abduction, & external rotation with cane   Contract/Relax 3x12 flexion PROM into active extension supine 2x12 flexion PROM into active extension supine ---------   Scapular Retraction 3x10 2x10  3x10    Open Book 1x10 stopped secondary to pay Held secondary to pain    Ball Rollout 2x10 blue ball seated  2x10 reps blue ball    Mid Row 3x15 green standing   3x15 green standing  3x15 reps red band standing   PROM 10 minutes into flexion and external rotation  10 minutes into flexion and external rotation X 5 mins into flexion & external rotation   UBE  3 min forward 3 min backward level 2.5 x5 min forward level 2.0 X 5 mins fwd. & 5 mins backwards level 3   Wall pulley X 5 mins flexion & abduction   X 5 mins flexion & abduction    Walk outs  From wall traction x 3 mins  3x15 with traction moment at wall From wall traction x 3 x 15 reps    Shoulder shrugs x20 reps X 20 reps  X 20 reps    Backward shoulder rolls   X 20 reps    Wall slides  With small towel 2x10 flexion and CW & CCW With small towel flexion and CW & CCW With small towel flexion and CW & CCW   Pendulum exercises  X 20 reps each way  3 X 20 reps each way  X 20 reps each way    Shoulder Abduction 2x10 AROM 2x10 AROM 2x10 reps AROM    Wall Walk 3x5 with pec stretch        Time spent with patient reviewing proper muscle recruitment and technique with exercises.      MANUAL THERAPY: (12 minutes): Joint mobilization and Soft tissue mobilization was utilized and necessary because of the patient's restricted joint motion and painful spasm   Soft tissue mobilization to upper trap and posterior shoulder capsule   Gentle distraction at glenohumeral joint    Grade 1-2 scapular mobilization in sidelying     MODALITIES: (0   minutes):      Not today     HEP: As above; handouts given to patient for all exercises. Treatment/Session Summary:    · Response to Treatment:  Patient reported some soreness but that the shoulder felt better upon leaving . Added wall walk with brief pec stretch. Added scapular mobilization in sidelying. · Baseline vitals (4/14/2022): Not assessed today  · Communication/Consultation:  Discussed HEP and POC with patient  · Equipment provided today:  Wall pulley issued for home use. · Recommendations/Intent for next treatment session: Next visit will focus on graded exercise program to improve activity tolerance and function of R UE.     Total Treatment Billable Duration:  57 mins   PT Patient Time In/Time Out  Time In: 0904  Time Out: [address] Kevin Sarmiento PT

## 2022-05-10 ENCOUNTER — HOSPITAL ENCOUNTER (OUTPATIENT)
Dept: PHYSICAL THERAPY | Age: 62
Discharge: HOME | End: 2022-05-10
Payer: COMMERCIAL

## 2022-05-10 PROCEDURE — 97110 THERAPEUTIC EXERCISES: CPT

## 2022-05-10 PROCEDURE — 97140 MANUAL THERAPY 1/> REGIONS: CPT

## 2022-05-10 NOTE — PROGRESS NOTES
Ashely Sofia  : 1960  Primary: [employer]*  Secondary:  [facility]  [address], [address], [place], [address] [place] [address] Street  [phone]   [phone]      OUTPATIENT PHYSICAL THERAPY: Daily Treatment Note 5/10/2022    ICD-10: Treatment Diagnosis: Pain in Right Shoulder (M25.511)              Treatment Diagnosis 2: Adhesive Capsulitis of Right Shoulder (M75.01)               Treatment Diagnosis 3: Strain of muscle(s) and tendon(s) of the rotator cuff of right Shoulder, sequela (S46.011S)                   Precautions: Patient has type 2 diabetes   Allergies: Patient has no known allergies. TREATMENT PLAN:  Effective Dates: 2022 TO 2022 (60 days). Frequency/Duration: 1 to 2 times a week for 60 Day(s) MEDICAL/REFERRING DIAGNOSIS:  Right shoulder pain, unspecified chronicity [M25.511]  Adhesive capsulitis of right shoulder [M75.01]   DATE OF ONSET: Patient reports onset of pain last  around 2021  REFERRING PHYSICIAN: Tiana Ewing MD Orders: Evaluate and Treat   Return MD Appointment: Not scheduled at this time      Pre-treatment Symptoms/Complaints: Patient reported no pain today. Pain: Initial: Pain Intensity 1: 0  Pain Location 1: Shoulder  Pain Orientation 1: Right  Post Session:  2/10 a little sore from exercises    Medications Last Reviewed:  5/10/2022  Updated Objective Findings:  Pt. had super tight scapula with mainual    TREATMENT:   THERAPEUTIC EXERCISE: (40  minutes):  Exercises per grid below to improve mobility, strength, balance and coordination. Required minimal visual, verbal, manual and tactile cues to promote proper body alignment, promote proper body posture, promote proper body mechanics and promote proper body breathing techniques. Progressed resistance, range, repetitions and complexity of movement as indicated.      Date:  2022 Date:  5/10/22 Date:  5/3/22   Activity/Exercise Parameters Parameters Parameters   AAROM Shoulder 2 x10 reps with cane flexion, abduction, & external rotation with cane 2x15 AAROM flexion with cane    2x15 supine punch hands touching emphasis on using R UE    2x10 holding 5 sec ER with cane 2 x10 reps with cane flexion, abduction, & external rotation with cane   Contract/Relax 3x12 flexion PROM into active extension supine 2x12 flexion PROM into active extension supine ---------   Scapular Retraction 3x10 3x10 depress & squeeze 3x10    Open Book 1x10 stopped secondary to pay     Ball Rollout 2x10 blue ball seated 2x10 reps standing at plinth  2x10 reps blue ball    Mid Row 3x15 green standing   3x15 green standing  3x15 reps red band standing   PROM 10 minutes into flexion and external rotation  10 minutes into flexion and external rotation X 5 mins into flexion & external rotation   IR stretch with strap  X 4 reps x 30 sec hold each                 TRX   Shoulder flexion & extension, abduction,  IR & ER    UBE  3 min forward 3 min backward level 2.5 x5 min forward level 2.0 X 5 mins fwd. & 5 mins backwards level 3   Wall pulley X 5 mins flexion & abduction  X 5 mins flexion, scaption, and abduction  X 5 mins flexion & abduction    Walk outs  From wall traction x 3 mins  3x15 with traction moment at wall From wall traction x 3 x 15 reps    Shoulder shrugs x20 reps X 20 reps  X 20 reps    Backward shoulder rolls   X 20 reps    Wall slides  With small towel 2x10 flexion and CW & CCW With small towel flexion and CW & CCW With small towel flexion and CW & CCW   Pendulum exercises  X 20 reps each way  3 X 20 reps each way  X 20 reps each way    Shoulder Abduction 2x10 AROM 2x10 AROM 2x10 reps AROM    Wall Walk 3x5 with pec stretch        Time spent with patient reviewing proper muscle recruitment and technique with exercises.      MANUAL THERAPY: (15 minutes): Joint mobilization and Soft tissue mobilization was utilized and necessary because of the patient's restricted joint motion and painful spasm   Soft tissue mobilization to upper trap and posterior shoulder capsule   Gentle distraction at glenohumeral joint    Grade 1-2 scapular mobilization in sidelying     MODALITIES: (0   minutes):      Not today     HEP: As above; handouts given to patient for all exercises. Treatment/Session Summary:    · Response to Treatment:  Patient was compliant with all exercises but continues to have a super tight right shoulder. · Baseline vitals (4/14/2022): Not assessed today  · Communication/Consultation:  Discussed HEP and POC with patient  · Equipment provided today:  [employer] pulley issued for home use. · Recommendations/Intent for next treatment session: Next visit will focus on graded exercise program to improve activity tolerance and function of R UE.     Total Treatment Billable Duration:  55  mins   PT Patient Time In/Time Out  Time In: 9043  Time Out: Carlos Grier PTA

## 2022-05-12 ENCOUNTER — HOSPITAL ENCOUNTER (OUTPATIENT)
Dept: PHYSICAL THERAPY | Age: 62
Discharge: HOME | End: 2022-05-12
Payer: COMMERCIAL

## 2022-05-12 PROCEDURE — 97140 MANUAL THERAPY 1/> REGIONS: CPT

## 2022-05-12 PROCEDURE — 97110 THERAPEUTIC EXERCISES: CPT

## 2022-05-12 NOTE — PROGRESS NOTES
Cristino Hernandez  : 1960  Primary: [employer]*  Secondary:  [facility]  [address], [address], [place], [address] [place] [address] Street  [phone]   [phone]      OUTPATIENT PHYSICAL THERAPY: Daily Treatment Note 2022    ICD-10: Treatment Diagnosis: Pain in Right Shoulder (M25.511)              Treatment Diagnosis 2: Adhesive Capsulitis of Right Shoulder (M75.01)               Treatment Diagnosis 3: Strain of muscle(s) and tendon(s) of the rotator cuff of right Shoulder, sequela (S46.011S)                   Precautions: Patient has type 2 diabetes   Allergies: Patient has no known allergies. TREATMENT PLAN:  Effective Dates: 2022 TO 2022 (60 days). Frequency/Duration: 1 to 2 times a week for 60 Day(s) MEDICAL/REFERRING DIAGNOSIS:  Right shoulder pain, unspecified chronicity [M25.511]  Adhesive capsulitis of right shoulder [M75.01]   DATE OF ONSET: Patient reports onset of pain last  around 2021  REFERRING PHYSICIAN: Chioma Howell, [id number] MD Orders: Evaluate and Treat   Return MD Appointment: Not scheduled at this time      Pre-treatment Symptoms/Complaints: Patient continues to report no pain today. Pain: Initial: Pain Intensity 1: 0  Pain Location 1: Shoulder  Pain Orientation 1: Right  Post Session:  1/10 a little sore from exercises    Medications Last Reviewed:  2022  Updated Objective Findings:  Pt. 's right shoulder range of motion flexion 135 degrees, abduction 100 degrees, ER 26 degrees, and IR 20 degrees. TREATMENT:   THERAPEUTIC EXERCISE: (40  minutes):  Exercises per grid below to improve mobility, strength, balance and coordination. Required minimal visual, verbal, manual and tactile cues to promote proper body alignment, promote proper body posture, promote proper body mechanics and promote proper body breathing techniques.   Progressed resistance, range, repetitions and complexity of movement as indicated.      Date:  5/5/2022 Date:  5/10/22 Date:  5/12/22   Activity/Exercise Parameters Parameters Parameters   AAROM Shoulder 2 x10 reps with cane flexion, abduction, & external rotation with cane 2x15 AAROM flexion with cane    2x15 supine punch hands touching emphasis on using R UE    2x10 holding 5 sec ER with cane 2 x10 reps with cane flexion, abduction, & external rotation with cane   Contract/Relax 3x12 flexion PROM into active extension supine 2x12 flexion PROM into active extension supine 3x15 flexion PROM into active extension supine   Scapular Retraction 3x10 3x10 depress & squeeze 3x10 depress & squeeze   Open Book 1x10 stopped secondary to pay     Ball Rollout 2x10 blue ball seated 2x10 reps standing at plinth  2x10 reps blue ball    Mid Row 3x15 green standing   3x15 green standing  3x15 reps red band standing   PROM 10 minutes into flexion and external rotation  10 minutes into flexion and external rotation X 5 mins into flexion & external rotation   IR stretch with strap  X 4 reps x 30 sec hold each                 TRX   Shoulder flexion & extension, abduction,  IR & ER Shoulder flexion, extension, abduction, IR  & ER   UBE  3 min forward 3 min backward level 2.5 x5 min forward level 2.0 X 5 mins fwd. & 5 mins backwards level 3   Wall pulley X 5 mins flexion & abduction  X 5 mins flexion, scaption, and abduction  X 5 mins flexion & abduction    Walk outs  From wall traction x 3 mins  3x15 with traction moment at wall From wall traction x 3 x 15 reps    Shoulder shrugs x20 reps X 20 reps  X 20 reps    Backward shoulder rolls   X 20 reps    Wall slides  With small towel 2x10 flexion and CW & CCW With small towel flexion and CW & CCW With small towel flexion and CW & CCW   Pendulum exercises  X 20 reps each way  3 X 20 reps each way  X 20 reps each way    Shoulder Abduction 2x10 AROM 2x10 AROM 2x10 reps AROM    Wall Walk 3x5 with pec stretch        Time spent with patient reviewing proper muscle recruitment and technique with exercises. MANUAL THERAPY: (15 minutes): Joint mobilization and Soft tissue mobilization was utilized and necessary because of the patient's restricted joint motion and painful spasm   Soft tissue mobilization to upper trap and posterior shoulder capsule   Gentle distraction at glenohumeral joint    Grade 1-2 scapular mobilization in sidelying     MODALITIES: (0   minutes):      Not today     HEP: As above; handouts given to patient for all exercises. Treatment/Session Summary:    · Response to Treatment:  Patient continues to present an extremely tight scapula with limited range. · Baseline vitals (4/14/2022): Not assessed today  · Communication/Consultation:  Discussed HEP and POC with patient  · Equipment provided today:  [employer] pulley issued for home use. · Recommendations/Intent for next treatment session: Next visit will focus on graded exercise program to improve activity tolerance and function of R UE.     Total Treatment Billable Duration:  55  mins   PT Patient Time In/Time Out  Time In: 0800  Time Out: 0900  Breanna Morton PTA

## 2022-05-12 NOTE — THERAPY EVALUATION
Moises Cazares  : 1960  Primary: [employer]*  Secondary:  [facility] [place] [address] [place], [address] [place] [address], [place], [address] [place] [address]  [phone]   [phone]       OUTPATIENT PHYSICAL THERAPY:Progress Report 2022    ICD-10: Treatment Diagnosis: Pain in Right Shoulder (M25.511)              Treatment Diagnosis 2: Adhesive Capsulitis of Right Shoulder (M75.01)               Treatment Diagnosis 3: Strain of muscle(s) and tendon(s) of the rotator cuff of right Shoulder, sequela (S46.011S)                   Precautions: Patient has type 2 diabetes   Allergies: Patient has no known allergies. TREATMENT PLAN:  Effective Dates: 2022 TO 2022 (60 days). Frequency/Duration: 1 to 2 times a week for 60 Day(s) MEDICAL/REFERRING DIAGNOSIS:  Pain in right shoulder [M25.511]  Adhesive capsulitis of right shoulder [M75.01]   DATE OF ONSET: Patient reports onset of pain last fall around 2021  REFERRING PHYSICIAN: Tiana Avery MD Orders: Evaluate and Treat   Return MD Appointment: Not scheduled at this time     INITIAL ASSESSMENT:  Ms. Dylon Morrison is a 62 y.o. female presenting to physical therapy with complaints of R shoulder pain. 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 Patient reports she landed on her R shoulder causing instant mobility deficits and pain. Patient then met with orthopedic team which resulted in an injection on 22. Patient reports pain relief since injection but no improvement in ROM. Patient currently has pain with doing ADLs and anything requiring her to raise and lift her arm. Patient reports symptom relief following ice and heat.  Patient is R handed and reports difficulty performing ADLs such as getting dressed, brushing her teeth, eating, and washing her hair because of pain and limitations. Patient has hx of type 2 DM, HTN, and high cholesterol as well as a C2-C7 decompression surgery performed several years ago. She would like to improve function to improve ability to perform ADLs as well as return to gardening and navigating her home and community environment without pain or limitation. Patient presents with increased pain, decreased strength, decreased ROM, decreased flexibility, impaired posture, impaired overhead reach, impaired transfer ability, decreased activity tolerance, and overall impaired functional mobility. Patient is a good candidate for skilled physical therapy interventions to include manual therapy, therapeutic exercise, transfer training, postural re-education, body mechanics training, and pain modalities as needed to improve deficits and progress functional independence. PROGRESS UPDATE: 5/12/2022: Patient was seen for 9 sessions of Physical Therapy from 4/14/2022 to 5/12/2022. Patient reports feeling 60% better with pain and dysfunction in R shoulder. Patient has demonstrated improvements in ROM per goniometer, strength per exercise selection and MMT, and activity tolerance per standardized outcome measure. Despite improvements patient continues to demonstrate functional deficits in aforementioned areas limiting her ability to perform all ADLs. Patient continues to have ROM deficits in flexion and external rotation impacting her ability to raise and lift arm overhead. Patient will benefit from continued skilled Physical Therapy in order to address remaining deficits and progress functional independence. PROBLEM LIST (Impacting functional limitations):  1. Decreased Strength  2. Decreased ADL/Functional Activities  3. Decreased Transfer Abilities  4. Increased Pain  5. Decreased Activity Tolerance  6. Decreased Work Simplification/Energy Conservation Techniques  7.  Decreased Flexibility/Joint Mobility  8. Decreased [place] with Home Exercise Program INTERVENTIONS PLANNED: (Treatment may consist of any combination of the following)  1. Cold  2. Cryotherapy  3. Electrical Stimulation  4. Heat  5. Home Exercise Program (HEP)  6. Manual Therapy  7. Neuromuscular Re-education/Strengthening  8. Range of Motion (ROM)  9. Therapeutic Activites  10. Therapeutic Exercise/Strengthening  11. Transcutaneous Electrical Nerve Stimulation (TENS)  12. Transfer Training     GOALS: (Goals have been discussed and agreed upon with patient.)  Short-Term Functional Goals: Time Frame: 4/14/2022 to 5/12/2022  1. Patient demonstrates independence with home exercise program without verbal cueing provided by therapist. (GOAL MET)  2. Patient will report no more than 2/10 R shoulder pain at rest in order to demonstrate improved self pain control and tolerance. (GOAL MET)  3. Patient will be educated in and demonstrate improved upright posture including decreased forward head and increased posterior tilt to scapula to improve sitting posture and mobility. (GOAL MET)  4. Patient will be able to raise R UE to 90 degrees in order to prepare food in her kitchen. (GOAL MET)  Discharge Goals: Time Frame: 4/14/2022 to 6/13/2022  1. Patient will report no more than 0/10 R shoulder pain at rest in order to demonstrate improved self pain control and tolerance. (IN PROGRESS)  2. Patient will be able to demonstrate 150 degrees of shoulder flexion in order to raise and lift her arm to reach the upper cabinet in her kitchen. (IN PROGRESS)  3. Patient will demonstrate functional ER to C7 in order to wash the back of her head. (IN PROGRESS)  4. Patient will demonstrate 4+/5 bilat UE strength in order to raise and lift 20 pounds while gardening. (IN PROGRESS)  5. Patient will improve Disability of the Arm, Shoulder, and Hand score to under 20/55 from 38/55. (IN PROGRESS)    Outcome Measure:    Tool Used: Disabilities of the Arm, Shoulder and Hand (DASH) Questionnaire - Quick Version  Score:  Initial: 38/55  Most Recent: 29/55 (Date: 5/12/22 )   Interpretation of Score: The DASH is designed to measure the activities of daily living in person's with upper extremity dysfunction or pain. Each section is scored on a 1-5 scale, 5 representing the greatest disability. The scores of each section are added together for a total score of 55. OBJECTIVE DATA:    Patient denies  any headaches, changes in vision, dizziness, vertigo, nausea, drop attacks, black outs, tinnitus, dysphagia, dysarthria, LE symptoms or bowel/bladder dysfunction. Observation/Orthostatic Postural Assessment:          Patient sits and stands with rounded shoulders (from Patient sits and stands with forward head and shoulder in protected position of internal rotation and elbow flexion)  Palpation:          Patient improving but still has tenderness in R cervical region (From Patient diffusely tender through R shoulder and R cervical region)  ROM:          WFL: within functional limits         NT: Not tested   AROM/ PROM Left (degrees) Right (degrees)   Shoulder Flexion 180 82 (from 55)   Shoulder Abduction 176 60 (from 28)   Shoulder Internal Rotation  82 65 (from 50)   Functional Internal Rotation T4-6 Greater trochanter   Shoulder External Rotation 88 28 (from 20)   Functional External Rotation C7 Occiput (from NT secondary to pain)     Strength:             Motion Tested Left   (*/5) Right  (*/5)   Shoulder Flexion 4+ 3- (from 2)   Scaption 4+ 3- (from 2)   Shoulder Abduction 4+ 3- (from 2)   Shoulder Internal Rotation  4+ 3- (from 2)   Shoulder External Rotation 4 3- (from 2)   Shoulder Internal Rotation   (90 abduction) 4+ NT secondary to pain   Shoulder External Rotation   (90 abduction) 4 NT secondary to pain   Elbow Flexion 4+ 3   Elbow Extension 4+ 3   Wrist Flexion 5 4+ (from 4)   Wrist Extension 5 4+ (from 4)     Special Tests:  Not performed today secondary to irritability and pain with motion. Passive Accessory Motion:         Decreased and painful global R UE movement. Neurological Screen:              Myotomes: Key muscle strength testing through bilateral UE is [place] but painful on R UE. Dermatomes: Sensation to light touch for bilateral UE is intact and WFL. Reflexes:          Biceps (C5): 2         Brachioradialis (C6): 2        Triceps (C7): 2  Abnormal reflexes:  Rose: negative  Neural tension tests: Upper limb tension test is NT secondary to pain. Functional Mobility:         Overhead reach: unable to perform secondary to pain. Balance:          NT secondary to nature of visit. Medical Necessity:   · Patient is expected to demonstrate progress in strength, range of motion, balance, coordination and functional technique to improve ability to perform ADLs as well as navigate home and community environment. · Skilled intervention continues to be required due to decreased activity tolerance, decreased ROM, decreased strength, and increased pain. Reason for Services/Other Comments:  · Patient continues to require skilled intervention due to increasing difficulty of exercises within graded exercise program.      Rehabilitation Potential For Stated Goals: Good  Regarding Kiara Ferrari's therapy, I certify that the treatment plan above will be carried out by a therapist or under their direction. Thank you for this referral,  Brennen Lainez PT     Referring Physician Signature: MARY Ewing No Signature is Required for this note.

## 2022-05-17 ENCOUNTER — HOSPITAL ENCOUNTER (OUTPATIENT)
Dept: PHYSICAL THERAPY | Age: 62
Discharge: HOME | End: 2022-05-17
Payer: COMMERCIAL

## 2022-05-17 PROCEDURE — 97110 THERAPEUTIC EXERCISES: CPT

## 2022-05-17 PROCEDURE — 97140 MANUAL THERAPY 1/> REGIONS: CPT

## 2022-05-17 NOTE — PROGRESS NOTES
Albino Hendricks  : 1960  Primary: [facility] [employer]*  Secondary:  [facility]  [address], [address], [place], [address]  [phone]   Hospitals in [phone]      OUTPATIENT PHYSICAL THERAPY: Daily Treatment Note 2022    ICD-10: Treatment Diagnosis: Pain in Right Shoulder (M25.511)              Treatment Diagnosis 2: Adhesive Capsulitis of Right Shoulder (M75.01)               Treatment Diagnosis 3: Strain of muscle(s) and tendon(s) of the rotator cuff of right Shoulder, sequela (S46.011S)                   Precautions: Patient has type 2 diabetes   Allergies: Patient has no known allergies. TREATMENT PLAN:  Effective Dates: 2022 TO 2022 (60 days). Frequency/Duration: 1 to 2 times a week for 60 Day(s) MEDICAL/REFERRING DIAGNOSIS:  Right shoulder pain, unspecified chronicity [M25.511]  Adhesive capsulitis of right shoulder [M75.01]   DATE OF ONSET: Patient reports onset of pain last fall around 2021  REFERRING PHYSICIAN: Tiana Cortez MD Orders: Evaluate and Treat   Return MD Appointment: Not scheduled at this time      Pre-treatment Symptoms/Complaints: Patient reports a little soreness from previous session. Pain: Initial: Pain Intensity 1: 0  Pain Location 1: Shoulder  Pain Orientation 1: Right  Post Session:  1/10 a little sore from exercises    Medications Last Reviewed:  2022  Updated Objective Findings:  Patient has flexion to  140 today   TREATMENT:   THERAPEUTIC EXERCISE: (44  minutes):  Exercises per grid below to improve mobility, strength, balance and coordination. Required minimal visual, verbal, manual and tactile cues to promote proper body alignment, promote proper body posture, promote proper body mechanics and promote proper body breathing techniques. Progressed resistance, range, repetitions and complexity of movement as indicated.      Date:  2022 Date:  5/10/22 Date:  22 Activity/Exercise Parameters Parameters Parameters   AAROM Shoulder 2 x10 reps with cane flexion, abduction, & external rotation with cane 2x15 AAROM flexion with cane    2x15 supine punch hands touching emphasis on using R UE    2x10 holding 5 sec ER with cane 2 x10 reps with cane flexion, abduction, & external rotation with cane   Contract/Relax 3x15 flexion PROM into active extension supine 2x12 flexion PROM into active extension supine 3x15 flexion PROM into active extension supine   Scapular Retraction 3x10 3x10 depress & squeeze 3x10 depress & squeeze   Ball Rollout  2x10 reps standing at plinth  2x10 reps blue ball    Mid Row 3x15 blue standing     3x10 bent row with 10 pounds  3x15 green standing  3x15 reps red band standing   PROM X 5 mins into flexion & external rotation 10 minutes into flexion and external rotation X 5 mins into flexion & external rotation   IR stretch with strap  X 4 reps x 30 sec hold each     TRX  2 min Shoulder flexion, extension, abduction, IR  & ER Shoulder flexion & extension, abduction,  IR & ER Shoulder flexion, extension, abduction, IR  & ER   UBE  5 min forward and 5 backward level 3.0 x5 min forward level 2.0 X 5 mins fwd. & 5 mins backwards level 3   Wall pulley X 5 mins flexion & abduction  X 5 mins flexion, scaption, and abduction  X 5 mins flexion & abduction    Walk outs  From wall traction x 3 x 15 reps   3x15 with traction moment at wall From wall traction x 3 x 15 reps    Shoulder shrugs x20 reps X 20 reps  X 20 reps    Backward shoulder rolls x20 reps  X 20 reps    Wall slides  With small towel 2x10 flexion and CW & CCW With small towel flexion and CW & CCW With small towel flexion and CW & CCW   Pendulum exercises  X 20 reps each way  3 X 20 reps each way  X 20 reps each way    Shoulder Abduction 1x10 AROM    2x10 red  band 2x10 AROM 2x10 reps AROM    Wall Walk 3x5 with pec stretch      External rotation 2x10 AROM seated with scapular retraction       Time spent with patient reviewing proper muscle recruitment and technique with exercises. MANUAL THERAPY: (12 minutes): Joint mobilization and Soft tissue mobilization was utilized and necessary because of the patient's restricted joint motion and painful spasm   Soft tissue mobilization to upper trap and posterior shoulder capsule   Gentle distraction at glenohumeral joint    Grade 1-2 scapular mobilization in sidelying     MODALITIES: (0   minutes):      Not today     HEP: As above; handouts given to patient for all exercises. Treatment/Session Summary:    · Response to Treatment:  Patient tolerated session well. Added mid row for additional volume of periscapular strengthening. .  · Baseline vitals (4/14/2022): Not assessed today  · Communication/Consultation:  Discussed HEP and POC with patient  · Equipment provided today:  Wall pulley issued for home use. · Recommendations/Intent for next treatment session: Next visit will focus on graded exercise program to improve activity tolerance and function of R UE.     Total Treatment Billable Duration:  56  mins   PT Patient Time In/Time Out  Time In: 1004  Time Out: 4368 Laurita Sidhu, PT

## 2022-05-19 ENCOUNTER — HOSPITAL ENCOUNTER (OUTPATIENT)
Dept: PHYSICAL THERAPY | Age: 62
Discharge: HOME | End: 2022-05-19
Payer: COMMERCIAL

## 2022-05-19 PROCEDURE — 97110 THERAPEUTIC EXERCISES: CPT

## 2022-05-19 PROCEDURE — 97140 MANUAL THERAPY 1/> REGIONS: CPT

## 2022-05-19 NOTE — PROGRESS NOTES
Elaina Siddiqui  : 1960  Primary: Sumeet Inwood Essentials*  Secondary:  [facility]  [address], [address] [place], [place], [address]  [phone]   [phone]      OUTPATIENT PHYSICAL THERAPY: Daily Treatment Note 2022    ICD-10: Treatment Diagnosis: Pain in Right Shoulder (M25.511)              Treatment Diagnosis 2: Adhesive Capsulitis of Right Shoulder (M75.01)               Treatment Diagnosis 3: Strain of muscle(s) and tendon(s) of the rotator cuff of right Shoulder, sequela (S46.011S)                   Precautions: Patient has type 2 diabetes   Allergies: Patient has no known allergies. TREATMENT PLAN:  Effective Dates: 2022 TO 2022 (60 days). Frequency/Duration: 1 to 2 times a week for 60 Day(s) MEDICAL/REFERRING DIAGNOSIS:  Right shoulder pain, unspecified chronicity [M25.511]  Adhesive capsulitis of right shoulder [M75.01]   DATE OF ONSET: Patient reports onset of pain last  around 2021  REFERRING PHYSICIAN: Tiana Romero MD Orders: Evaluate and Treat   Return MD Appointment: Not scheduled at this time      Pre-treatment Symptoms/Complaints: Patient reported no pain today. Pain: Initial: Pain Intensity 1: 0  Pain Location 1: Shoulder  Pain Orientation 1: Right  Post Session:  1/10 a little sore from exercises    Medications Last Reviewed:  2022  Updated Objective Findings:  Patient responded well with increased IR with the contract relax method. TREATMENT:   THERAPEUTIC EXERCISE: (45  minutes):  Exercises per grid below to improve mobility, strength, balance and coordination. Required minimal visual, verbal, manual and tactile cues to promote proper body alignment, promote proper body posture, promote proper body mechanics and promote proper body breathing techniques. Progressed resistance, range, repetitions and complexity of movement as indicated.      Date:  2022 Date:  22 Date:  5/12/22   Activity/Exercise Parameters Parameters Parameters   AAROM Shoulder 2 x10 reps with cane flexion, abduction, & external rotation with cane 2x15 AAROM flexion with cane    2x15 supine punch hands touching emphasis on using R UE    2x10 holding 5 sec ER with cane 2 x10 reps with cane flexion, abduction, & external rotation with cane   Contract/Relax 3x15 flexion PROM into active extension supine 2x12 flexion PROM into active extension supine 3x15 flexion PROM into active extension supine   Scapular Retraction 3x10 3x10 depress & squeeze 3x10 depress & squeeze   Ball Rollout  2x10 reps standing at plinth  2x10 reps blue ball    Mid Row 3x15 blue standing     3x10 bent row with 10 pounds  2x10 reps standing red band  3x15 reps red band standing   PROM X 5 mins into flexion & external rotation 10 minutes into flexion and external rotation X 5 mins into flexion & external rotation   IR stretch with strap  X 4 reps x 30 sec hold each     TRX  2 min Shoulder flexion, extension, abduction,   & ER Shoulder flexion & extension x 20 reps  Shoulder flexion, extension, abduction, IR  & ER   UBE  5 min forward and 5 backward level 3.0 x10  mins  forward level 4 X 5 mins fwd. & 5 mins backwards level 3   Wall pulley X 5 mins flexion & abduction  X 5 mins flexion, scaption, and abduction  X 5 mins flexion & abduction    Walk outs  From wall traction x 3 x 15 reps   3x15 with traction moment at wall From wall traction x 3 x 15 reps    Shoulder shrugs x20 reps X 20 reps  X 20 reps    Backward shoulder rolls x20 reps  X 20 reps    Wall slides  With small towel 2x10 flexion and CW & CCW With small towel flexion and CW & CCW With small towel flexion and CW & CCW   Pendulum exercises  X 20 reps each way  3 X 20 reps each way  X 20 reps each way    Shoulder Abduction 1x10 AROM    2x10 red  band 2x10 AROM 2x10 reps AROM    Wall Walk 3x5 with pec stretch  X 30 reps up finger ladder then rotate ti     Seated IR behind back Seated on plinth x 20 reps bows. Then fwd bow with IR with pillow case    External rotation 2x10 AROM seated with scapular retraction 2x10 reps AROM seated with scapular retraction       Time spent with patient reviewing proper muscle recruitment and technique with exercises. MANUAL THERAPY: (10 minutes): Joint mobilization and Soft tissue mobilization was utilized and necessary because of the patient's restricted joint motion and painful spasm   Soft tissue mobilization to upper trap and posterior shoulder capsule   Gentle distraction at glenohumeral joint    Grade 1-2 scapular mobilization in sidelying     MODALITIES: (10    minutes):      Pt, received ice pack to right shoulder after manual      HEP: As above; handouts given to patient for all exercises. Treatment/Session Summary:    · Response to Treatment:  Patient was compliant with all exercises and continued to report no pain. .  · Baseline vitals (4/14/2022): Not assessed today  · Communication/Consultation:  Discussed HEP and POC with patient  · Equipment provided today:  Wall pulley issued for home use. · Recommendations/Intent for next treatment session: Next visit will focus on graded exercise program to improve activity tolerance and function of R UE.     Total Treatment Billable Duration:  55  mins   PT Patient Time In/Time Out  Time In: 0900  Time Out: [address], PTA

## 2022-05-24 ENCOUNTER — HOSPITAL ENCOUNTER (OUTPATIENT)
Dept: PHYSICAL THERAPY | Age: 62
Setting detail: RECURRING SERIES
Discharge: HOME | End: 2022-05-27
Payer: COMMERCIAL

## 2022-05-24 ENCOUNTER — APPOINTMENT (OUTPATIENT)
Dept: PHYSICAL THERAPY | Age: 62
End: 2022-05-24
Payer: COMMERCIAL

## 2022-05-24 PROCEDURE — 97110 THERAPEUTIC EXERCISES: CPT

## 2022-05-24 PROCEDURE — 97140 MANUAL THERAPY 1/> REGIONS: CPT

## 2022-05-24 NOTE — PROGRESS NOTES
Alvaro Zafar  : 1960  Primary: Mckayla Valentino Essentials*  Secondary:  [facility]  [address], [address], [place], [address]  [phone]   [phone]       OUTPATIENT PHYSICAL THERAPY: Daily Treatment Note 2022     ICD-10: Treatment Diagnosis: Pain in Right Shoulder (M25.511)              Treatment Diagnosis 2: Adhesive Capsulitis of Right Shoulder (M75.01)               Treatment Diagnosis 3: Strain of muscle(s) and tendon(s) of the rotator cuff of right Shoulder, sequela (S46.011S)                    Precautions: Patient has type 2 diabetes   Allergies: Patient has no known allergies. TREATMENT PLAN:  Effective Dates: 2022 TO 2022 (60 days). Frequency/Duration: 1 to 2 times a week for 60 Day(s) MEDICAL/REFERRING DIAGNOSIS:  Right shoulder pain, unspecified chronicity [M25.511]  Adhesive capsulitis of right shoulder [M75.01]   DATE OF ONSET: Patient reports onset of pain last fall around 2021  REFERRING PHYSICIAN: Serenity Lopez, 2011 Wilda Smith MD Orders: Evaluate and Treat   Return MD Appointment: Not scheduled at this time      Pre-treatment Symptoms/Complaints: Patient reports no pain today and continued progress with ROM and ability to perform ADLs.    Pain: Initial: Pain Intensity 1: 0  Pain Location 1: Shoulder  Pain Orientation 1: Right  Post Session:  0/10 a little sore from exercises    Medications Last Reviewed:  2022  Updated Objective Findings:  Patient able to demonstrate improved ROM following session with flexion to 156 degrees today.     TREATMENT:   THERAPEUTIC EXERCISE: (45  minutes):  Exercises per grid below to improve mobility, strength, balance and coordination. Required minimal visual, verbal, manual and tactile cues to promote proper body alignment, promote proper body posture, promote proper body mechanics and promote proper body breathing techniques.   Progressed resistance, range, repetitions and complexity of movement as indicated.       Date:  5/17/2022 Date:  5/19/22 Date:  5/24/22   Activity/Exercise Parameters Parameters Parameters   AAROM Shoulder 2 x10 reps with cane flexion, abduction, & external rotation with cane 2x15 AAROM flexion with cane     2x15 supine punch hands touching emphasis on using R UE     2x10 holding 5 sec ER with cane 2x15 AAROM flexion with cane     2x15 supine punch hands touching emphasis on using R UE     2x10 holding 5 sec ER with cane   Contract/Relax 3x15 flexion PROM into active extension supine 2x12 flexion PROM into active extension supine 3x15 flexion PROM into active extension supine   Scapular Retraction 3x10 3x10 depress & squeeze 3x10 depress & squeeze   Ball Rollout   2x10 reps standing at plinth  2x10 reps blue ball    Mid Row 3x15 blue standing      3x10 bent row with 10 pounds  2x10 reps standing red band  3x15 blue standing      3x10 bent row with 10 pounds    PROM X 5 mins into flexion & external rotation 10 minutes into flexion and external rotation X 5 mins into flexion & external rotation   IR stretch with strap   X 4 reps x 30 sec hold each   X 4 reps x 30 sec hold each   TRX  2 min Shoulder flexion, extension, abduction,   & ER Shoulder flexion & extension x 20 reps  Shoulder flexion & extension x 20 reps    UBE  5 min forward and 5 backward level 3.0 x10  mins  forward level 4 x10  mins  forward level 4   Wall pulley X 5 mins flexion & abduction  X 5 mins flexion, scaption, and abduction  X 5 mins flexion & abduction    Walk outs  From wall traction x 3 x 15 reps   3x15 with traction moment at wall From wall traction x 3 x 15 reps    Shoulder shrugs x20 reps X 20 reps  X 20 reps    Backward shoulder rolls x20 reps      Wall slides  With small towel 2x10 flexion and CW & CCW With small towel flexion and CW & CCW With small towel flexion and CW & CCW   Pendulum exercises  X 20 reps each way  3 X 20 reps each way  X 20 reps each way Shoulder Abduction 1x10 AROM     2x10 red  band 2x10 AROM 2x10 reps AROM    Wall Walk 3x5 with pec stretch  X 30 reps up finger ladder then rotate ti      Seated IR behind back    Seated on plinth x 20 reps bows. Then fwd bow with IR with pillow case  Seated on plinth x 20 reps bows. Then fwd bow with IR with pillow case   External rotation 2x10 AROM seated with scapular retraction 2x10 reps AROM seated with scapular retraction   2x10 reps AROM seated with scapular retraction       Time spent with patient reviewing proper muscle recruitment and technique with exercises.      MANUAL THERAPY: (10 minutes): Joint mobilization and Soft tissue mobilization was utilized and necessary because of the patient's restricted joint motion and painful spasm  · Soft tissue mobilization to upper trap and posterior shoulder capsule  · Gentle distraction at glenohumeral joint   · Grade 1-2 scapular mobilization in sidelying      MODALITIES: (0    minutes):      None today     HEP: As above; handouts given to patient for all exercises.     Treatment/Session Summary:    · Response to Treatment:  Patient tolerated session well with continued focus on ROM as well as emphasis on internal rotation ROM. Patient demonstrating improved flexion following session. · Baseline vitals (5/24/2022):  Not assessed today  · Communication/Consultation:  Discussed HEP and POC with patient  · Equipment provided today:  None today  · Recommendations/Intent for next treatment session: Next visit will focus on graded exercise program to improve activity tolerance and function of R UE.     Total Treatment Billable Duration:  55  mins   PT Patient Time In/Time Out  Time In: 0800  Time Out: 0900  Toshia Bruce PTA

## 2022-05-26 ENCOUNTER — HOSPITAL ENCOUNTER (OUTPATIENT)
Dept: PHYSICAL THERAPY | Age: 62
Setting detail: RECURRING SERIES
Discharge: HOME | End: 2022-05-29
Payer: COMMERCIAL

## 2022-05-26 ENCOUNTER — APPOINTMENT (OUTPATIENT)
Dept: PHYSICAL THERAPY | Age: 62
End: 2022-05-26
Payer: COMMERCIAL

## 2022-05-26 PROCEDURE — 97110 THERAPEUTIC EXERCISES: CPT

## 2022-05-26 PROCEDURE — 97140 MANUAL THERAPY 1/> REGIONS: CPT

## 2022-05-26 NOTE — PROGRESS NOTES
I am accessing Ms. Mccall's chart as a part of our department's internal chart auditing process. I certify that Ms. Esvin Henderosn is, or was, a patient in our department.   Thank you,  Sheri Franks, PT  5/26/2022

## 2022-05-26 NOTE — PROGRESS NOTES
Artmario Rashidred  : 1960  Primary: Jc Lacy [employer]entials*  Secondary:  [facility]  [address], [address], [place], [address]  [phone]   [phone]       OUTPATIENT PHYSICAL THERAPY: Daily Treatment Note 2022     ICD-10: Treatment Diagnosis: Pain in Right Shoulder (M25.511)              Treatment Diagnosis 2: Adhesive Capsulitis of Right Shoulder (M75.01)               Treatment Diagnosis 3: Strain of muscle(s) and tendon(s) of the rotator cuff of right Shoulder, sequela (S46.011S)                    Precautions: Patient has type 2 diabetes   Allergies: Patient has no known allergies. TREATMENT PLAN:  Effective Dates: 2022 TO 2022 (60 days). Frequency/Duration: 1 to 2 times a week for 60 Day(s) MEDICAL/REFERRING DIAGNOSIS:  Right shoulder pain, unspecified chronicity [M25.511]  Adhesive capsulitis of right shoulder [M75.01]   DATE OF ONSET: Patient reports onset of pain last fall around 2021  REFERRING PHYSICIAN: BLAKE Vo MD Orders: Evaluate and Treat   Return MD Appointment: Not scheduled at this time      Pre-treatment Symptoms/Complaints: Patient continues to report no shoulder pain and no issues.      Pain: Initial: Pain Intensity 1: 0  Pain Location 1: Shoulder  Pain Orientation 1: Right  Post Session:  3/10 a little sore from exercises    Medications Last Reviewed:  2022  Updated Objective Findings:  Patient continues to have restricted right shoulder IR & ER today.     TREATMENT:   THERAPEUTIC EXERCISE: (45  minutes):  Exercises per grid below to improve mobility, strength, balance and coordination. Required minimal visual, verbal, manual and tactile cues to promote proper body alignment, promote proper body posture, promote proper body mechanics and promote proper body breathing techniques.   Progressed resistance, range, repetitions and complexity of movement as indicated.       Date:  5/26/2022 Date:  5/19/22 Date:  5/24/22   Activity/Exercise Parameters Parameters Parameters   AAROM Shoulder 2 x10 reps with cane flexion, abduction, & external rotation with cane 2x15 AAROM flexion with cane     2x15 supine punch hands touching emphasis on using R UE     2x10 holding 5 sec ER with cane 2x15 AAROM flexion with cane     2x15 supine punch hands touching emphasis on using R UE     2x10 holding 5 sec ER with cane   Contract/Relax 3x15 flexion PROM into active extension supine 2x12 flexion PROM into active extension supine 3x15 flexion PROM into active extension supine   Scapular Retraction 3x10 3x10 depress & squeeze 3x10 depress & squeeze   Ball Rollout  ------ 2x10 reps standing at plinth  2x10 reps blue ball    Mid Row 3x15 blue standing      3x10 bent row with 10 pounds  2x10 reps standing red band  3x15 blue standing      3x10 bent row with 10 pounds    PROM X 5 mins into flexion & external rotation 10 minutes into flexion and external rotation X 5 mins into flexion & external rotation   IR stretch with strap  6x30 sec hold with strap  X 4 reps x 30 sec hold each   X 4 reps x 30 sec hold each   TRX  Shoulder flexion & extension x 30 reps  Shoulder flexion & extension x 20 reps  Shoulder flexion & extension x 20 reps    UBE  5 min forward and 5 backward level 4 x10  mins  forward level 4 x10  mins  forward level 4   Wall pulley X 5 mins flexion & abduction  X 5 mins flexion, scaption, and abduction  X 5 mins flexion & abduction    Walk outs  From wall traction x 3 x 15 reps   3x15 with traction moment at wall From wall traction x 3 x 15 reps    Shoulder shrugs x20 reps X 20 reps  X 20 reps    Backward shoulder rolls x20 reps      Wall slides  With small towel 2x10 flexion and CW & CCW With small towel flexion and CW & CCW With small towel flexion and CW & CCW   Pendulum exercises  X 20 reps each way  3 X 20 reps each way  X 20 reps each way    Shoulder Abduction 1x10 AROM     2x10 red  band 2x10 AROM 2x10 reps AROM    Wall Walk 3x5 with pec stretch  X 30 reps up finger ladder then rotate ti      Seated IR behind back   --------- Seated on plinth x 20 reps bows. Then fwd bow with IR with pillow case  Seated on plinth x 20 reps bows. Then fwd bow with IR with pillow case   External rotation 2x10 AROM with yellow band in standing with scapular retraction 2x10 reps AROM seated with scapular retraction   2x10 reps AROM seated with scapular retraction       Time spent with patient reviewing proper muscle recruitment and technique with exercises.      MANUAL THERAPY: (10 minutes): Joint mobilization and Soft tissue mobilization was utilized and necessary because of the patient's restricted joint motion and painful spasm  · Soft tissue mobilization to upper trap and posterior shoulder capsule  · Gentle distraction at glenohumeral joint   · Grade 1-2 scapular mobilization in sidelying      MODALITIES: ( x  8 mins):   Supine ice pack to right shoulder       HEP: As above; handouts given to patient for all exercises.     Treatment/Session Summary:    · Response to Treatment:  Patient would benefit from a continuation of PT to focus on IR & ER. Pt. Continues to lack full range in shoulder. · Baseline vitals (5/24/2022):  Not assessed today  · Communication/Consultation:  Discussed HEP and POC with patient  · Equipment provided today:  None today  · Recommendations/Intent for next treatment session: Next visit will focus on graded exercise program to improve activity tolerance and function of R UE.     Total Treatment Billable Duration:  55  mins   PT Patient Time In/Time Out  Time In: 0800  Time Out: 0900  Perez Hunter PTA

## 2022-05-31 ENCOUNTER — APPOINTMENT (OUTPATIENT)
Dept: PHYSICAL THERAPY | Age: 62
End: 2022-05-31
Payer: COMMERCIAL

## 2022-05-31 ENCOUNTER — HOSPITAL ENCOUNTER (OUTPATIENT)
Dept: PHYSICAL THERAPY | Age: 62
Setting detail: RECURRING SERIES
Discharge: HOME | End: 2022-06-03
Payer: COMMERCIAL

## 2022-05-31 PROCEDURE — 97140 MANUAL THERAPY 1/> REGIONS: CPT

## 2022-05-31 PROCEDURE — 97110 THERAPEUTIC EXERCISES: CPT

## 2022-05-31 ASSESSMENT — PAIN SCALES - GENERAL: PAINLEVEL_OUTOF10: 0

## 2022-05-31 NOTE — PROGRESS NOTES
Katerina Antunez  : 1960  Primary: Sergio Fletcher Sc  Secondary:  [address] @ [address] [place] [id number]  Phone: [phone]  Fax: [phone] Plan Frequency: 1 to 2 times as week for 60 days    Plan of Care/Certification Expiration Date: 22 (Plan started 2022)      PT Visit Info:   No data recorded    OUTPATIENT PHYSICAL THERAPY:OP NOTE TYPE: Treatment Note 2022       Episode  }Appt Desk              ICD-10: Treatment Diagnosis: Pain in Right Shoulder (M25.511)              Treatment Diagnosis 2: Adhesive Capsulitis of Right Shoulder (M75.01)               Treatment Diagnosis 3: Strain of muscle(s) and tendon(s) of the rotator cuff of right Shoulder, sequela (S46.011S)  Medical/Referring Diagnosis:  Fistula, 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 Restrictions/Precautions:  No data recordedNo data recorded   Interventions Planned (Treatment may consist of any combination of the following):    Current Treatment Recommendations: Strengthening; ROM; Functional mobility training; ADL/Self-care training; Endurance training; Neuromuscular re-education; Manual Therapy - Soft Tissue Mobilization; Manual Therapy - Joint Manipulation; Pain management; Home exercise program; Patient/Caregiver education & training; Modalities; Integrated dry needling; Therapeutic activities     Subjective Comments:  Patient reports shoulder is not doing too bad. Patient still reports limitation reaching above head.    Initial:}    0/10Post Session:       0/10  Medications Last Reviewed:  2022  Updated Objective Findings:  Patient able to reach 150 degrees flexion   Treatment   THERAPEUTIC EXERCISE: (45  minutes):  Exercises per grid below to improve mobility, strength, balance and coordination.  Required minimal visual, verbal, manual and tactile cues to promote proper body alignment, promote proper body posture, promote proper body mechanics and promote proper body breathing techniques.  Progressed resistance, range, repetitions and complexity of movement as indicated.       Date:  5/26/2022 Date:  5/31/2022 Date:  5/24/22   Activity/Exercise Parameters Parameters Parameters   AAROM Shoulder 2 x10 reps with cane flexion, abduction, & external rotation with cane 2 x10 reps with cane flexion, abduction, & external rotation with cane 2x15 AAROM flexion with cane     2x15 supine punch hands touching emphasis on using R UE     2x10 holding 5 sec ER with cane   Contract/Relax 3x15 flexion PROM into active extension supine 3x15 flexion PROM into active extension supine 3x15 flexion PROM into active extension supine   Scapular Retraction 3x10 3x10 3x10 depress & squeeze   Ball Rollout    2x10 reps blue ball    Mid Row 3x15 blue standing      3x10 bent row with 10 pounds  3x15 blue standing      3x10 bent row with 10 pounds 3x15 blue standing      3x10 bent row with 10 pounds    PROM X 5 mins into flexion & external rotation X 5 mins into flexion & external rotation X 5 mins into flexion & external rotation   IR stretch with strap  6x30 sec hold with strap  3x30 sec with strap  X 4 reps x 30 sec hold each   TRX  Shoulder flexion & extension x 30 reps  3x10 shoulder flexion and extension  Shoulder flexion & extension x 20 reps    UBE  5 min forward and 5 backward level 4 5 min forward and 5 backward level 4 x10  mins  forward level 4   Wall pulley X 5 mins flexion & abduction  X 5 mins flexion, scaption, and abduction  X 5 mins flexion & abduction    Walk outs  From wall traction x 3 x 15 reps   3x15 with traction moment at wall From wall traction x 3 x 15 reps    Shoulder shrugs x20 reps X 20 reps  X 20 reps Backward shoulder rolls x20 reps       Wall slides  With small towel 2x10 flexion and CW & CCW With small towel flexion and CW & CCW With small towel flexion and CW & CCW   Pendulum exercises  X 20 reps each way  x20 each way X 20 reps each way    Shoulder Abduction 1x10 AROM     2x10 red  band 1x10 AROM     2x10 red  band 2x10 reps AROM    Wall Walk 3x5 with pec stretch  3x5 with pec stretch      Seated IR behind back   --------- Seated on plinth x 20 reps bows. Then fwd bow with IR with pillow case  Seated on plinth x 20 reps bows. Then fwd bow with IR with pillow case   Supine Pullover  2x12 5 pound weight supine    External rotation 2x10 AROM with yellow band in standing with scapular retraction 2x5 seated red     2x10 standing red  2x10 reps AROM seated with scapular retraction       Time spent with patient reviewing proper muscle recruitment and technique with exercises.      MANUAL THERAPY: (10 minutes): Joint mobilization and Soft tissue mobilization was utilized and necessary because of the patient's restricted joint motion and painful spasm  · Soft tissue mobilization to upper trap and posterior shoulder capsule  · Gentle distraction at glenohumeral joint   · Grade 1-2 scapular mobilization in sidelying      MODALITIES: ( 0mins):   None today        Treatment/Session Summary:    · Treatment Assessment:  Continued to emphasize flexion and external rotation ROM throughout session. Patient tolerated session well but reported some increased discomfort from internal rotation hold. · Communication/Consultation:  Discussed HEP with patient. · Equipment provided today:  None  · Recommendations/Intent for next treatment session: Next visit will focus on graded exercise program to improve activity tolerance, improve ROM, improve strength, and decrease pain.  .    Total Treatment Billable Duration:  55 minutes   Time In: 0902  Time Out: REVA Walden       Charge Capture  }Post Session Pain 295 Jennifer Valentino MD Guidelines  Scanned Media  Benefits  MyChart    Future Appointments   Date Time Provider Nancy Moctezuma   6/2/2022  9:00 AM Lui Guzman, PT [facility]   6/6/2022  9:00 AM Caryle Hind, PTA [facility] [place]   6/8/2022  9:00 AM Lui Guzman, PT [facility]   7/28/2022  3:50 PM Víctor Richard MD [facility]

## 2022-06-02 ENCOUNTER — HOSPITAL ENCOUNTER (OUTPATIENT)
Dept: PHYSICAL THERAPY | Age: 62
Setting detail: RECURRING SERIES
Discharge: HOME | End: 2022-06-05
Payer: COMMERCIAL

## 2022-06-02 ENCOUNTER — APPOINTMENT (OUTPATIENT)
Dept: PHYSICAL THERAPY | Age: 62
End: 2022-06-02

## 2022-06-02 PROCEDURE — 97110 THERAPEUTIC EXERCISES: CPT

## 2022-06-02 ASSESSMENT — PAIN SCALES - GENERAL: PAINLEVEL_OUTOF10: 3

## 2022-06-02 ASSESSMENT — PAIN DESCRIPTION - ORIENTATION: ORIENTATION: RIGHT

## 2022-06-02 ASSESSMENT — PAIN DESCRIPTION - LOCATION: LOCATION: SHOULDER

## 2022-06-02 NOTE — PROGRESS NOTES
[facility]  : 1960  Primary: Germán Pepito Jones  Secondary:  [address] @ [address] [facility] [address]  Phone: [phone]  Fax: [phone] Plan Frequency: 1 to 2 times as week for 60 days    Plan of Care/Certification Expiration Date: 22 (Plan started 2022)      PT Visit Info:   No data recorded    OUTPATIENT PHYSICAL THERAPY:OP NOTE TYPE: Treatment Note 2022       Episode  }Appt Desk              ICD-10: Treatment Diagnosis: Pain in Right Shoulder (M25.511)              Treatment Diagnosis 2: Adhesive Capsulitis of Right Shoulder (M75.01)               Treatment Diagnosis 3: Strain of muscle(s) and tendon(s) of the rotator cuff of right Shoulder, sequela (S46.011S)  Medical/Referring Diagnosis:  Fistula, right shoulder [M25.111]  Adhesive capsulitis of right shoulder [M75.01]  Referring Physician:  BLAKE Hitchcock MD Orders:  PT Eval and Treat   Date of Onset:  Onset Date: 21 (Approximate date per patient after fall)     Allergies:   Patient has no allergy information on record. Restrictions/Precautions:  No data recordedNo data recorded   Interventions Planned (Treatment may consist of any combination of the following):    Current Treatment Recommendations: Strengthening; ROM; Functional mobility training; ADL/Self-care training; Endurance training; Neuromuscular re-education; Manual Therapy - Soft Tissue Mobilization; Manual Therapy - Joint Manipulation; Pain management; Home exercise program; Patient/Caregiver education & training; Modalities; Integrated dry needling; Therapeutic activities     Subjective Comments:  Patient reports some increased soreness today. Patient has discomfort from rotator cuff strengthening.    Initial:}Right Shoulder 3/10Post Session:  Right  Shoulder 2/10  Medications Last Reviewed:  2022  Updated Objective Findings:  Patient able to reach 152 degrees flexion   Treatment THERAPEUTIC EXERCISE: (54  minutes):  Exercises per grid below to improve mobility, strength, balance and coordination.  Required minimal visual, verbal, manual and tactile cues to promote proper body alignment, promote proper body posture, promote proper body mechanics and promote proper body breathing techniques.  Progressed resistance, range, repetitions and complexity of movement as indicated.       Date:  5/26/2022 Date:  5/31/2022 Date:  6/2/2022   Activity/Exercise Parameters Parameters Parameters   AAROM Shoulder 2 x10 reps with cane flexion, abduction, & external rotation with cane 2 x10 reps with cane flexion, abduction, & external rotation with cane 2 x10 reps with cane flexion, abduction, & external rotation with cane   Contract/Relax 3x15 flexion PROM into active extension supine 3x15 flexion PROM into active extension supine 3x15 flexion PROM into active extension supine   Scapular Retraction 3x10 3x10 3x10 depress & squeeze   Ball Rollout    2x10 reps blue ball    Mid Row 3x15 blue standing      3x10 bent row with 10 pounds  3x15 blue standing      3x10 bent row with 10 pounds 3x15 blue standing high row      3x10 bent row with 10 pounds    PROM X 5 mins into flexion & external rotation X 5 mins into flexion & external rotation X 5 mins into flexion & external rotation   IR stretch with strap  6x30 sec hold with strap  3x30 sec with strap 3x30 sec with strap   TRX  Shoulder flexion & extension x 30 reps  3x10 shoulder flexion and extension  3x10 flexion and extension with TRX   UBE  5 min forward and 5 backward level 4 5 min forward and 5 backward level 4 5 min forward and 5 min backward level 4.0   Wall pulley X 5 mins flexion & abduction  X 5 mins flexion, scaption, and abduction  X 5 mins flexion & abduction    Walk outs  From wall traction x 3 x 15 reps   3x15 with traction moment at wall 3x15 with traction moment at wall   Shoulder shrugs x20 reps X 20 reps  X 20 reps    Backward shoulder rolls x20 reps       Wall slides  With small towel 2x10 flexion and CW & CCW With small towel flexion and CW & CCW 3x10 with pillow case   Pendulum exercises  X 20 reps each way  x20 each way 3X 20 reps each way    Shoulder Abduction 1x10 AROM     2x10 red  band 1x10 AROM     2x10 red  band 1x10 AROM     3x10 green band   Wall Walk 3x5 with pec stretch  3x5 with pec stretch   3x5 with pec stretch    Seated IR behind back   --------- Seated on plinth x 20 reps bows. Then fwd bow with IR with pillow case  Seated on plinth x 20 reps bows. Then fwd bow with IR with pillow case   Supine Pullover  2x12 5 pound weight supine 3x12 5 pound weight supine   External rotation 2x10 AROM with yellow band in standing with scapular retraction 2x5 seated red     2x10 standing red  3x10 reps AROM seated with scapular retraction       Time spent with patient reviewing proper muscle recruitment and technique with exercises.      MANUAL THERAPY: (0 minutes): Joint mobilization and Soft tissue mobilization was utilized and necessary because of the patient's restricted joint motion and painful spasm  · None today     MODALITIES: ( 0mins):   None today        Treatment/Session Summary:    · Treatment Assessment:  Prioritized and emphasized ROM at beginning of session with TRX and walk out exercise. Continued to work on periscapular strengthening following ROM exercises. Patient required cuing for scapular position with row. · Communication/Consultation:  Discussed HEP with patient. · Equipment provided today:  None  · Recommendations/Intent for next treatment session: Next visit will focus on graded exercise program to improve activity tolerance, improve ROM, improve strength, and decrease pain.  .    Total Treatment Billable Duration:  54 minutes   Time In: 0900  Time Out: REVA Walden       Charge Capture  }Post Session Pain  MedBridge Portal  MD Guidelines  Scanned Media  Benefits  [employer]    Future Appointments   Date Time Provider Nancy Moctezuma   6/6/2022  9:00 AM Antionette Gosselin, PTA [facility]   6/8/2022  9:00 AM Brady Orozco, PT [facility]   7/28/2022  3:50 PM Noe Skelton MD [facility]

## 2022-06-06 ENCOUNTER — HOSPITAL ENCOUNTER (OUTPATIENT)
Dept: PHYSICAL THERAPY | Age: 62
Setting detail: RECURRING SERIES
Discharge: HOME | End: 2022-06-09
Payer: COMMERCIAL

## 2022-06-06 ENCOUNTER — APPOINTMENT (OUTPATIENT)
Dept: PHYSICAL THERAPY | Age: 62
End: 2022-06-06

## 2022-06-06 PROCEDURE — 97110 THERAPEUTIC EXERCISES: CPT

## 2022-06-06 ASSESSMENT — PAIN DESCRIPTION - ORIENTATION: ORIENTATION: RIGHT

## 2022-06-06 ASSESSMENT — PAIN DESCRIPTION - LOCATION: LOCATION: SHOULDER

## 2022-06-06 ASSESSMENT — PAIN SCALES - GENERAL: PAINLEVEL_OUTOF10: 2

## 2022-06-06 NOTE — PROGRESS NOTES
Sung Dudley  : 1960  Primary: Mitchel Alcocer Sc  Secondary:  [address] @ [address] [place] [address]  Phone: [phone]  Fax: [phone] Plan Frequency: 1 to 2 times as week for 60 days    Plan of Care/Certification Expiration Date: 22 (Plan started 2022)      PT Visit Info:   No data recorded    OUTPATIENT PHYSICAL THERAPY:OP NOTE TYPE: Treatment Note 2022       Episode  }Appt Desk              ICD-10: Treatment Diagnosis: Pain in Right Shoulder (M25.511)              Treatment Diagnosis 2: Adhesive Capsulitis of Right Shoulder (M75.01)               Treatment Diagnosis 3: Strain of muscle(s) and tendon(s) of the rotator cuff of right Shoulder, sequela (S46.011S)  Medical/Referring Diagnosis:  Fistula, right shoulder [M25.111]  Adhesive capsulitis of right shoulder [M75.01]  Referring Physician:  BLAKE Gomes MD Orders:  PT Eval and Treat   Date of Onset:  Onset Date: 21 (Approximate date per patient after fall)     Allergies:   Patient has no allergy information on record. Restrictions/Precautions:  No data recordedNo data recorded   Interventions Planned (Treatment may consist of any combination of the following):    Current Treatment Recommendations: Strengthening; ROM; Functional mobility training; ADL/Self-care training; Endurance training; Neuromuscular re-education; Manual Therapy - Soft Tissue Mobilization; Manual Therapy - Joint Manipulation; Pain management; Home exercise program; Patient/Caregiver education & training; Modalities; Integrated dry needling; Therapeutic activities     Subjective Comments:Pt. Reported anterior deltoid pain and thinks its from bilateral external rotation with green band from a couple of sessions ago.    Pt. continues to report a fifty percent improvement with shoulder range of motion and strength  Initial:}Right Shoulder 2/10Post Session:  Right  Shoulder 1/10  Medications Last Reviewed:  6/6/2022  Updated Objective Findings:  Patient continues to be guarded initially but demonstrates increased shoulder range and strength  Treatment   THERAPEUTIC EXERCISE: (55  minutes):  Exercises per grid below to improve mobility, strength, balance and coordination.  Required minimal visual, verbal, manual and tactile cues to promote proper body alignment, promote proper body posture, promote proper body mechanics and promote proper body breathing techniques.  Progressed resistance, range, repetitions and complexity of movement as indicated.       Date:  6/6/2022 Date:  5/31/2022 Date:  6/2/2022   Activity/Exercise Parameters Parameters Parameters   AAROM Shoulder 2 x10 reps with cane flexion, abduction, & external rotation with cane 2 x10 reps with cane flexion, abduction, & external rotation with cane 2 x10 reps with cane flexion, abduction, & external rotation with cane   Contract/Relax 3x15 flexion PROM into active extension supine 3x15 flexion PROM into active extension supine 3x15 flexion PROM into active extension supine   Scapular Retraction 3x10 3x10 3x10 depress & squeeze   Ball Rollout 2x10 reps green ball    2x10 reps blue ball    Mid Row 3x15 blue standing      3x10 bent row with 10 pounds  3x15 blue standing      3x10 bent row with 10 pounds 3x15 blue standing high row      3x10 bent row with 10 pounds    PROM X 5 mins into flexion & external rotation X 5 mins into flexion & external rotation X 5 mins into flexion & external rotation   IR stretch with strap  6x30 sec hold with strap  3x30 sec with strap 3x30 sec with strap   TRX  Shoulder flexion & extension x 30 reps  3x10 shoulder flexion and extension  3x10 flexion and extension with TRX   UBE  5 min forward and 5 backward level 4 5 min forward and 5 backward level 4 5 min forward and 5 min backward level 4.0   Wall pulley X 5 mins flexion & abduction  X 5 mins flexion, scaption, and abduction  X 5 mins flexion & abduction    Walk outs  From wall traction x 3 x 15 reps   3x15 with traction moment at wall 3x15 with traction moment at wall   Shoulder shrugs x20 reps X 20 reps  X 20 reps    Backward shoulder rolls x20 reps       Wall slides  With small towel 2x10 flexion and CW & CCW With small towel flexion and CW & CCW 3x10 with pillow case   Pendulum exercises  X 20 reps each way  x20 each way 3X 20 reps each way    Shoulder Abduction 1x10 AROM     2x10 red  band 1x10 AROM     2x10 red  band 1x10 AROM     3x10 green band   Wall Walk 3x5 with pec stretch  3x5 with pec stretch   3x5 with pec stretch    Seated IR behind back   --------- Seated on plinth x 20 reps bows. Then fwd bow with IR with pillow case  Seated on plinth x 20 reps bows. Then fwd bow with IR with pillow case   Supine Pullover  2x12 5 pound weight supine 3x12 5 pound weight supine   External rotation 2x10 AROM with yellow band in standing with scapular retraction 2x5 seated red     2x10 standing red  3x10 reps AROM seated with scapular retraction       Time spent with patient reviewing proper muscle recruitment and technique with exercises.      MANUAL THERAPY: (0 minutes): Joint mobilization and Soft tissue mobilization was utilized and necessary because of the patient's restricted joint motion and painful spasm  · None today     MODALITIES: ( 0mins):   None today        Treatment/Session Summary:    · Treatment Assessment:  Pt. continues to demonstrate increased range of motion and strength in right shoulder  · Communication/Consultation:  Discussed HEP with patient. · Equipment provided today:  None  · Recommendations/Intent for next treatment session: Next visit will focus on graded exercise program to improve activity tolerance, improve ROM, improve strength, and decrease pain.  .    Total Treatment Billable Duration:  55 minutes   Time In: 0900  Time Out: 1000    SHONA CANNON PTA       Charge Capture  }Post Session Pain  Med[employer] Portal  MD Guidelines  Scanned Media Benefits  MyChart    Future Appointments   Date Time Provider Nancy Rhianna   6/8/2022  9:00 AM Anjali Bowman PT [facility]   6/13/2022  9:30 AM BLAKE Christopher [facility]   7/28/2022  3:50 PM Rupinder Pina MD [facility]

## 2022-06-08 ENCOUNTER — APPOINTMENT (OUTPATIENT)
Dept: PHYSICAL THERAPY | Age: 62
End: 2022-06-08

## 2022-06-08 ENCOUNTER — HOSPITAL ENCOUNTER (OUTPATIENT)
Dept: PHYSICAL THERAPY | Age: 62
Setting detail: RECURRING SERIES
Discharge: HOME | End: 2022-06-11
Payer: COMMERCIAL

## 2022-06-08 PROCEDURE — 97110 THERAPEUTIC EXERCISES: CPT

## 2022-06-08 ASSESSMENT — PAIN DESCRIPTION - LOCATION: LOCATION: SHOULDER

## 2022-06-08 ASSESSMENT — PAIN DESCRIPTION - ORIENTATION: ORIENTATION: RIGHT

## 2022-06-08 ASSESSMENT — PAIN SCALES - GENERAL: PAINLEVEL_OUTOF10: 1

## 2022-06-08 NOTE — PROGRESS NOTES
Nima Zimmerman  : 1960  Primary: [facility]*  Secondary:  [facility] [address] [facility]ly [facility], [address] [place] [address], [address] [place] [address] Street  [phone]   [phone]       OUTPATIENT PHYSICAL THERAPY:DISCHARGE REPORT 2022    ICD-10: Treatment Diagnosis: Pain in Right Shoulder (M25.511)              Treatment Diagnosis 2: Adhesive Capsulitis of Right Shoulder (M75.01)               Treatment Diagnosis 3: Strain of muscle(s) and tendon(s) of the rotator cuff of right Shoulder, sequela (S46.011S)                    Precautions: Patient has type 2 diabetes   Allergies: Patient has no known allergies. TREATMENT PLAN:  Effective Dates: 2022 TO 2022 (60 days). Frequency/Duration: 1 to 2 times a week for 60 Day(s) MEDICAL/REFERRING DIAGNOSIS:  Pain in right shoulder [M25.511]  Adhesive capsulitis of right shoulder [M75.01]   DATE OF ONSET: Patient reports onset of pain last fall around 2021  REFERRING PHYSICIAN: Aristeo Vance MD Orders: Evaluate and Treat   Return MD Appointment: Not scheduled at this time      INITIAL ASSESSMENT:  Ms. Caitlin Simons is a 62 y.o. female presenting to physical therapy with complaints of R shoulder pain. 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 Patient reports she landed on her R shoulder causing instant mobility deficits and pain. Patient then met with orthopedic team which resulted in an injection on 22. Patient reports pain relief since injection but no improvement in ROM. Patient currently has pain with doing ADLs and anything requiring her to raise and lift her arm. Patient reports symptom relief following ice and heat.  Patient is R handed and reports difficulty performing ADLs such as getting dressed, brushing her teeth, eating, and washing her hair because of pain and limitations. Patient has hx of type 2 DM, HTN, and high cholesterol as well as a C2-C7 decompression surgery performed several years ago. She would like to improve function to improve ability to perform ADLs as well as return to gardening and navigating her home and community environment without pain or limitation. Patient presents with increased pain, decreased strength, decreased ROM, decreased flexibility, impaired posture, impaired overhead reach, impaired transfer ability, decreased activity tolerance, and overall impaired functional mobility. Patient is a good candidate for skilled physical therapy interventions to include manual therapy, therapeutic exercise, transfer training, postural re-education, body mechanics training, and pain modalities as needed to improve deficits and progress functional independence.     DISCHARGE UPDATE: 6/8/2022: Patient was seen for 17 sessions of Physical Therapy from 4/14/2022 to 6/8/2022. Patient reports feeling 50-60% better with pain and dysfunction in R shoulder. Throughout the POC patient has demonstrated slow improvement in ROM, strength, and activity tolerance. Despite improvements, patient continues to be functionally limited impacting shoulder forward elevation as well as ability to perform all ADLs and household tasks. Patient has limitations but feels as though she can continue to progress per self with HEP. Patient is being discharged at this time. PROBLEM LIST (Impacting functional limitations):  1. Decreased Strength  2. Decreased ADL/Functional Activities  3. Decreased Transfer Abilities  4. Increased Pain  5. Decreased Activity Tolerance  6. Decreased Work Simplification/Energy Conservation Techniques  7. Decreased Flexibility/Joint Mobility  8.  Decreased Refugio with Home Exercise Program INTERVENTIONS PLANNED: (Treatment may consist of any combination of the following)  1. Cold  2. Cryotherapy  3. Electrical Stimulation  4. Heat  5. Home Exercise Program (HEP)  6. Manual Therapy  7. Neuromuscular Re-education/Strengthening  8. Range of Motion (ROM)  9. Therapeutic Activites  10. Therapeutic Exercise/Strengthening  11. Transcutaneous Electrical Nerve Stimulation (TENS)  12. Transfer Training      GOALS: (Goals have been discussed and agreed upon with patient.)  Short-Term Functional Goals: Time Frame: 4/14/2022 to 5/12/2022  1. Patient demonstrates independence with home exercise program without verbal cueing provided by therapist. (GOAL MET)  2. Patient will report no more than 2/10 R shoulder pain at rest in order to demonstrate improved self pain control and tolerance. (GOAL MET)  3. Patient will be educated in and demonstrate improved upright posture including decreased forward head and increased posterior tilt to scapula to improve sitting posture and mobility. (GOAL MET)  4. Patient will be able to raise R UE to 90 degrees in order to prepare food in her kitchen. (GOAL MET)  Discharge Goals: Time Frame: 4/14/2022 to 6/13/2022  1. Patient will report no more than 0/10 R shoulder pain at rest in order to demonstrate improved self pain control and tolerance. (NOT MET)  2. Patient will be able to demonstrate 150 degrees of shoulder flexion in order to raise and lift her arm to reach the upper cabinet in her kitchen. (NOT MET)  3. Patient will demonstrate functional ER to C7 in order to wash the back of her head. (GOAL MET)  4. Patient will demonstrate 4+/5 bilat UE strength in order to raise and lift 20 pounds while gardening. (NOT MET)  5. Patient will improve Disability of the Arm, Shoulder, and Hand score to under 20/55 from 38/55. (GOAL MET)     Outcome Measure: Tool Used: Disabilities of the Arm, Shoulder and Hand (DASH) Questionnaire - Quick Version  Score:  Initial: 38/55  Most Recent: 20/55 (Date: 6/8/22 )   Interpretation of Score:  The DASH is designed to measure the activities of daily living in person's with upper extremity dysfunction or pain. Each section is scored on a 1-5 scale, 5 representing the greatest disability. The scores of each section are added together for a total score of 55. OBJECTIVE DATA:     Patient denies  any headaches, changes in vision, dizziness, vertigo, nausea, drop attacks, black outs, tinnitus, dysphagia, dysarthria, LE symptoms or bowel/bladder dysfunction.     Observation/Orthostatic Postural Assessment:          Patient sits and stands with rounded shoulders (from Patient sits and stands with forward head and shoulder in protected position of internal rotation and elbow flexion)  Palpation:          Patient improving but still has tenderness in R cervical region (From Patient diffusely tender through R shoulder and R cervical region)  ROM:          WFL: within functional limits         NT: Not tested   AROM/ PROM Left (degrees) Right (degrees)   Shoulder Flexion 180 90 (from 55), 145 supine    Shoulder Abduction 176 78 (from 28)   Shoulder Internal Rotation  82 72 (from 50)   Functional Internal Rotation T4-6 L5 (from Greater trochanter)   Shoulder External Rotation 88 30 (from 20)   Functional External Rotation C7 C1-2 (from NT secondary to pain)      Strength: Motion Tested Left   (*/5) Right  (*/5)   Shoulder Flexion 4+ 3- (from 2)   Scaption 4+ 3- (from 2)   Shoulder Abduction 4+ 3- (from 2)   Shoulder Internal Rotation  4+ 4- (from 2)   Shoulder External Rotation 4 3- (from 2)   Shoulder Internal Rotation   (90 abduction) 4+ NT secondary to pain   Shoulder External Rotation   (90 abduction) 4 NT secondary to pain   Elbow Flexion 4+ 4 (from 3)   Elbow Extension 4+ 4 (from 3)   Wrist Flexion 5 4+ (from 4)   Wrist Extension 5 4+ (from 4)      Special Tests:  Not performed today secondary to irritability and pain with motion. Passive Accessory Motion:         Decreased and painful global R UE movement. Neurological Screen:              Myotomes: Key muscle strength testing through bilateral UE is [facility] but painful on R UE. Dermatomes: Sensation to light touch for bilateral UE is intact and WFL. Reflexes:          Biceps (C5): 2         Brachioradialis (C6): 2        Triceps (C7): 2  Abnormal reflexes:  Mcdoewll: negative  Neural tension tests: Upper limb tension test is NT secondary to pain. Functional Mobility:         Overhead reach: unable to perform secondary to pain. Balance:          NT secondary to nature of visit.        Medical Necessity:   · Patient is being discharged at this time with HEP.       Rehabilitation Potential For Stated Goals: Good  Regarding Iris Mccall's therapy, I certify that the treatment plan above will be carried out by a therapist or under their direction. Thank you for this referral,  Otilia Pack PT     Referring Physician Signature: BLAKE King No Signature is Required for this note.

## 2022-06-08 NOTE — PROGRESS NOTES
Alvaro Zafar  : 1960  Primary: Kristy Mccann Sc  Secondary:  [address] @ [address] [place] [address]  Phone: [phone]  Fax: [phone] Plan Frequency: 1 to 2 times as week for 60 days    Plan of Care/Certification Expiration Date: 22 (Plan started 2022)      PT Visit Info:   No data recorded    OUTPATIENT PHYSICAL THERAPY:OP NOTE TYPE: Treatment Note 2022       Episode  }Appt Desk              ICD-10: Treatment Diagnosis: Pain in Right Shoulder (M25.511)              Treatment Diagnosis 2: Adhesive Capsulitis of Right Shoulder (M75.01)               Treatment Diagnosis 3: Strain of muscle(s) and tendon(s) of the rotator cuff of right Shoulder, sequela (S46.011S)  Medical/Referring Diagnosis:  Fistula, right shoulder [M25.111]  Adhesive capsulitis of right shoulder [M75.01]  Referring Physician:  BLAKE Lim MD Orders:  PT Eval and Treat   Date of Onset:  Onset Date: 21 (Approximate date per patient after fall)     Allergies:   Patient has no allergy information on record. Restrictions/Precautions:  No data recordedNo data recorded   Interventions Planned (Treatment may consist of any combination of the following):    Current Treatment Recommendations: Strengthening; ROM; Functional mobility training; ADL/Self-care training; Endurance training; Neuromuscular re-education; Manual Therapy - Soft Tissue Mobilization; Manual Therapy - Joint Manipulation; Pain management; Home exercise program; Patient/Caregiver education & training; Modalities; Integrated dry needling; Therapeutic activities     Subjective Comments:Pt feeling a little more sore in deltoid region. She has follow up next week with MD but reports feeling like she can continue exercises at home per self.       Initial:}Right Shoulder 1/10Post Session:  Right  Shoulder 1/10  Medications Last Reviewed:  2022  Updated Objective Findings:  Patient continues to be guarded initially but demonstrates increased shoulder range and strength  Treatment   THERAPEUTIC EXERCISE: (54  minutes):  Exercises per grid below to improve mobility, strength, balance and coordination.  Required minimal visual, verbal, manual and tactile cues to promote proper body alignment, promote proper body posture, promote proper body mechanics and promote proper body breathing techniques.  Progressed resistance, range, repetitions and complexity of movement as indicated.       Date:  6/6/2022 Date:  6/8/2022 Date:  6/2/2022   Activity/Exercise Parameters Parameters Parameters   AAROM Shoulder 2 x10 reps with cane flexion, abduction, & external rotation with cane 2x12 flexion hands touching in supine 2 x10 reps with cane flexion, abduction, & external rotation with cane   Contract/Relax 3x15 flexion PROM into active extension supine 3x15 flexion PROM into active extension supine 3x15 flexion PROM into active extension supine   Scapular Retraction 3x10 3x10 3x10 depress & squeeze   Ball Rollout 2x10 reps green ball    2x10 reps blue ball    Mid Row 3x15 blue standing      3x10 bent row with 10 pounds  3x15 blue standing      3x10 bent row with 10 pounds 3x15 blue standing high row      3x10 bent row with 10 pounds    PROM X 5 mins into flexion & external rotation X 10 mins into flexion & external rotation X 5 mins into flexion & external rotation   IR stretch with strap  6x30 sec hold with strap  HEP 3x30 sec with strap   TRX  Shoulder flexion & extension x 30 reps  3x12 flexion 3x10 flexion and extension with TRX   UBE  5 min forward and 5 backward level 4 5 min forward and 5 backward level 4 5 min forward and 5 min backward level 4.0   Wall pulley X 5 mins flexion & abduction  X 5 mins flexion, scaption, and abduction  X 5 mins flexion & abduction    Walk outs  From wall traction x 3 x 15 reps   HEP  3x15 with traction moment at wall   Shoulder shrugs x20 reps  X 20 reps    Backward shoulder rolls x20 reps  3x10 with foam roller     Wall slides  With small towel 2x10 flexion and CW & CCW With small towel flexion and CW & CCW 3x10 with pillow case   Pendulum exercises  X 20 reps each way  x20 each way 3X 20 reps each way    Shoulder Abduction 1x10 AROM     2x10 red  band 3x10 AROM      1x10 AROM     3x10 green band   Wall Walk 3x5 with pec stretch  3x10 wall slide with pillow case  3x5 with pec stretch    Seated IR behind back   ---------   Seated on plinth x 20 reps bows. Then fwd bow with IR with pillow case   Supine Pullover   3x12 5 pound weight supine   External rotation 2x10 AROM with yellow band in standing with scapular retraction 2x10 AROM  3x10 reps AROM seated with scapular retraction       Time spent with patient reviewing proper muscle recruitment and technique with exercises.      MANUAL THERAPY: (0 minutes): Joint mobilization and Soft tissue mobilization was utilized and necessary because of the patient's restricted joint motion and painful spasm  · None today     MODALITIES: ( 0mins):   None today        Treatment/Session Summary:    · Treatment Assessment:  Reduced intensity today secondary to some increased discomfort in deltoid region. Reviewed ROM exercises and instructed patient on further progression per self at home. Patient verbalized understanding. · Communication/Consultation:  Discussed HEP with patient.    · Equipment provided today:  None  · Recommendations/Intent for next treatment session: Discharge patient    Total Treatment Billable Duration:  54 minutes   Time In: 0901  Time Out: [address], PT       Charge Capture  }Post Session Pain  [facility] Portal  MD Guidelines  Scanned Media  Benefits  MyChart    Future Appointments   Date Time Provider Nancy Moctezuma   6/13/2022  9:30 AM [facility] [address]   7/28/2022  3:50 PM Akash Bower MD [facility]

## 2022-06-13 ENCOUNTER — OFFICE VISIT (OUTPATIENT)
Dept: ORTHOPEDIC SURGERY | Age: 62
End: 2022-06-13
Payer: COMMERCIAL

## 2022-06-13 DIAGNOSIS — M75.01 ADHESIVE CAPSULITIS OF RIGHT SHOULDER: Primary | ICD-10-CM

## 2022-06-13 PROCEDURE — 99214 OFFICE O/P EST MOD 30 MIN: CPT | Performed by: PHYSICIAN ASSISTANT

## 2022-06-13 RX ORDER — BUPROPION HYDROCHLORIDE 100 MG/1
300 TABLET ORAL DAILY
COMMUNITY
End: 2022-07-28

## 2022-06-13 RX ORDER — SULFAMETHOXAZOLE AND TRIMETHOPRIM 800; 160 MG/1; MG/1
TABLET ORAL
COMMUNITY
Start: 2022-03-21 | End: 2022-07-28

## 2022-06-13 RX ORDER — MELOXICAM 15 MG/1
15 TABLET ORAL DAILY
Qty: 30 TABLET | Refills: 0 | Status: SHIPPED | OUTPATIENT
Start: 2022-06-13

## 2022-06-13 RX ORDER — CARVEDILOL 25 MG/1
25 TABLET ORAL 2 TIMES DAILY
COMMUNITY
End: 2022-07-28

## 2022-06-13 RX ORDER — ERGOCALCIFEROL (VITAMIN D2) 1250 MCG
50000 CAPSULE ORAL WEEKLY
COMMUNITY
End: 2022-07-28

## 2022-06-13 RX ORDER — NITROFURANTOIN 25; 75 MG/1; MG/1
100 CAPSULE ORAL 2 TIMES DAILY
COMMUNITY
End: 2022-07-28

## 2022-06-13 RX ORDER — FLUCONAZOLE 150 MG/1
TABLET ORAL
COMMUNITY
Start: 2022-03-23 | End: 2022-07-28

## 2022-06-13 RX ORDER — ROSUVASTATIN CALCIUM 20 MG/1
20 TABLET, COATED ORAL DAILY
COMMUNITY
End: 2022-07-28

## 2022-06-13 RX ORDER — DIAZEPAM 10 MG/1
10 TABLET ORAL
COMMUNITY

## 2022-06-13 RX ORDER — TRAMADOL HYDROCHLORIDE 50 MG/1
TABLET ORAL
COMMUNITY
Start: 2022-05-03

## 2022-06-14 PROBLEM — C50.919 BREAST CA (HCC): Status: ACTIVE | Noted: 2022-06-14

## 2022-06-14 NOTE — PROGRESS NOTES
Name: Saadia Narvaez  YOB: 1960  Gender: female  MRN: [id number]    CC:   Chief Complaint   Patient presents with    Shoulder Pain     right shoulder recheck        HPI: Patient presents for FU of right shoulder pain. Patient had a glenohumeral injection on 22 and was given PT at that time. Patient notes after the injection it was amazing. She states it is now at 50% and it is currently wearing off. She notes no new injuries. She notes lateral and anterior shoulder pain. Allergies   Allergen Reactions    Lisinopril Other (See Comments)     cough       History reviewed. No pertinent past medical history. Past Surgical History:   Procedure Laterality Date    BREAST LUMPECTOMY Right 2009    BREAST RECONSTRUCTION      BSHSI PB GASTRIC BYPASS SP  2017    Lap band    CERVICAL LAMINECTOMY  2016    C2 to C7     LUMBAR DISCECTOMY  2008    L4/L5    MASTECTOMY, BILATERAL  2020    OTHER SURGICAL HISTORY Bilateral 2008    bilateral tear ducts -  and 2018      History reviewed. No pertinent family history.   Social History     Socioeconomic History    Marital status: Single     Spouse name: Not on file    Number of children: Not on file    Years of education: Not on file    Highest education level: Not on file   Occupational History    Not on file   Tobacco Use    Smoking status: Former Smoker     Quit date: 2017     Years since quittin.4    Smokeless tobacco: Never Used   Substance and Sexual Activity    Alcohol use: Not Currently    Drug use: Never    Sexual activity: Not on file   Other Topics Concern    Not on file   Social History Narrative    Not on file     Social Determinants of Health     Financial Resource Strain:     Difficulty of Paying Living Expenses: Not on file   Food Insecurity:     Worried About [address] in the Last Year: Not on file    Britany of Food in the Last Year: Not on file   Transportation Needs:     Lack of Transportation (Medical): Not on file    Lack of Transportation (Non-Medical): Not on file   Physical Activity:     Days of Exercise per Week: Not on file    Minutes of Exercise per Session: Not on file   Stress:     Feeling of Stress : Not on file   Social Connections:     Frequency of Communication with Friends and Family: Not on file    Frequency of Social Gatherings with Friends and Family: Not on file    Attends Mosque Services: Not on file    Active Member of [address] [place] [address] [employer] or Organizations: Not on file    Attends Club or Organization Meetings: Not on file    Marital Status: Not on file   Intimate Partner Violence:     Fear of Current or Ex-Partner: Not on file    Emotionally Abused: Not on file    Physically Abused: Not on file    Sexually Abused: Not on file   Housing Stability:     Unable to Pay for Housing in the Last Year: Not on file    Number of [place] in the Last Year: Not on file    Unstable Housing in the Last Year: Not on file        No flowsheet data found. Review of Systems  Non-contributory    PE right shoulder:    , abd 90, ER 15. Negative TTP AC joint and biceps tendon. Weakness in external rotation, otherwise, strength is appropriate. Distal motor function, sensation and pulses intact. A/Plan:     ICD-10-CM    1. Adhesive capsulitis of right shoulder  M75.01 meloxicam (MOBIC) 15 MG tablet        I discussed with the patient she has made progress with abduction but there has been minimal progress elsewhere. I discussed with the patient the importance of frequent stretching at home and we have showed her stretches in office today. We also will give a short amount of oral NSAIDs. Discussed patient has increased complexity secondary to historical breast cancer, renal cyst, diabetes, htn. We made the decision to continue conservatively and hopefully avoid surgical intervention given her increased risk of complications.   We will see her back for recheck in six weeks. Patient prescribed with Non-steroidal anti-inflammatories after review of risks and benefits. We discussed additional activity modification to help reduce pain for initial conservative treatment. Recommend therapy to evaluate and treat current complaints and pathology. A home exercise program was also discussed with the patient. Return in about 6 weeks (around 7/25/2022).         Aristeo Cade  06/14/22

## 2022-06-20 DIAGNOSIS — F51.01 PRIMARY INSOMNIA: Primary | ICD-10-CM

## 2022-06-20 RX ORDER — EZETIMIBE 10 MG/1
TABLET ORAL
Qty: 30 TABLET | Refills: 0 | Status: SHIPPED | OUTPATIENT
Start: 2022-06-20 | End: 2022-07-28 | Stop reason: SDUPTHER

## 2022-06-20 RX ORDER — METOPROLOL SUCCINATE 50 MG/1
TABLET, EXTENDED RELEASE ORAL
Qty: 30 TABLET | Refills: 0 | Status: SHIPPED | OUTPATIENT
Start: 2022-06-20 | End: 2022-07-28 | Stop reason: SDUPTHER

## 2022-06-20 RX ORDER — TEMAZEPAM 15 MG/1
CAPSULE ORAL
Qty: 30 CAPSULE | Refills: 0 | Status: SHIPPED | OUTPATIENT
Start: 2022-06-20 | End: 2022-08-19 | Stop reason: SDUPTHER

## 2022-07-25 ENCOUNTER — OFFICE VISIT (OUTPATIENT)
Dept: ORTHOPEDIC SURGERY | Age: 62
End: 2022-07-25
Payer: COMMERCIAL

## 2022-07-25 DIAGNOSIS — M75.01 ADHESIVE CAPSULITIS OF RIGHT SHOULDER: Primary | ICD-10-CM

## 2022-07-25 PROCEDURE — 20610 DRAIN/INJ JOINT/BURSA W/O US: CPT | Performed by: PHYSICIAN ASSISTANT

## 2022-07-25 PROCEDURE — 99214 OFFICE O/P EST MOD 30 MIN: CPT | Performed by: PHYSICIAN ASSISTANT

## 2022-07-25 RX ORDER — TRIAMCINOLONE ACETONIDE 40 MG/ML
80 INJECTION, SUSPENSION INTRA-ARTICULAR; INTRAMUSCULAR ONCE
Status: COMPLETED | OUTPATIENT
Start: 2022-07-25 | End: 2022-07-25

## 2022-07-25 RX ADMIN — TRIAMCINOLONE ACETONIDE 80 MG: 40 INJECTION, SUSPENSION INTRA-ARTICULAR; INTRAMUSCULAR at 10:48

## 2022-07-26 NOTE — PROGRESS NOTES
Name: Denisa Morales  YOB: 1960  Gender: female  MRN: [id number]    CC:   Chief Complaint   Patient presents with    Shoulder Pain     Right shoulder recheck          HPI: Patient presents for follow-up of right shoulder adhesive capsulitis. Patient states that the glenohumeral injection from last visit gave her significant relief. She also went to physical therapy for 8 weeks which she states gave significant improvement in range of motion. Patient has also been given Mobic on . She states she is not currently in any pain. Denies interference with sleep. Notes mild popping and clicking. She notes that she is not at 100% with her range of motion, but her pain has overall subsided and she is pleased with her progression. Allergies   Allergen Reactions    Lisinopril Other (See Comments)     cough       History reviewed. No pertinent past medical history. Past Surgical History:   Procedure Laterality Date    BREAST LUMPECTOMY Right 2009    BREAST RECONSTRUCTION  2020    BSHSI PB GASTRIC BYPASS SP  2017    Lap band    CERVICAL LAMINECTOMY  2016    C2 to C7     LUMBAR DISCECTOMY  2008    L4/L5    MASTECTOMY, BILATERAL  2020    OTHER SURGICAL HISTORY Bilateral 2008    bilateral tear ducts - 2008 and 2018      History reviewed. No pertinent family history.   Social History     Socioeconomic History    Marital status: Single     Spouse name: Not on file    Number of children: Not on file    Years of education: Not on file    Highest education level: Not on file   Occupational History    Not on file   Tobacco Use    Smoking status: Former     Types: Cigarettes     Quit date: 2017     Years since quittin.5    Smokeless tobacco: Never   Substance and Sexual Activity    Alcohol use: Not Currently    Drug use: Never    Sexual activity: Not on file   Other Topics Concern    Not on file   Social History Narrative    Not on file     Social Determinants of Health     Financial Resource Strain: Not on file   Food Insecurity: Not on file   Transportation Needs: Not on file   Physical Activity: Not on file   Stress: Not on file   Social Connections: Not on file   Intimate Partner Violence: Not on file   Housing Stability: Not on file        No flowsheet data found. Review of Systems  Non-contributory    PE right shoulder: Forward flexion 130, abduction 90, external rotation 20. Appropriate strength. Tender palpate mildly over the biceps tendon, negative AC joint. Distal motor function, sensation, pulses intact      A/Plan:     ICD-10-CM    1. Adhesive capsulitis of right shoulder  M75.01 triamcinolone acetonide (KENALOG-40) injection 80 mg     DRAIN/INJECT LARGE JOINT/BURSA           I discussed with the patient I am very pleased with her progression of forward flexion, I would have like to see him more in abduction and external rotation. She is feeling a whole lot better at this time, but still has lack of motion. I made the decision not to proceed surgically and continue conservative management at this time. Discussed trial of repeat cortisone injection and continuation of stretching frequently. Continue use of NSAIDs such as needed. She is amenable to proceed and will follow up in 8 weeks for recheck. Procedure note: After discussion of risks and benefits including, but not limited to pain, infection, steroid flare, increased blood sugar, fat necrosis, skin discoloration, and injury to blood vessels or nerves, the patient verbally consented to proceed with a glenohumeral joint injection. They understand that we are using this is an alternative method of treatment and the decision to not proceed with elective major surgery. We have discussed this decision in detail. The affected right shoulder was sterilely prepped in standard fashion and injected with 2 cc of Kenalag (40mg/ml), 2 cc of 1% Lidocaine, and 2 cc of 0.5% Marcaine into the joint space.   The patient tolerated the injection well. Return in about 8 weeks (around 9/19/2022).         Aristeo Arita  07/26/22

## 2022-07-28 ENCOUNTER — TELEMEDICINE (OUTPATIENT)
Dept: PRIMARY CARE CLINIC | Facility: CLINIC | Age: 62
End: 2022-07-28
Payer: COMMERCIAL

## 2022-07-28 DIAGNOSIS — E11.9 CONTROLLED TYPE 2 DIABETES MELLITUS WITHOUT COMPLICATION, WITHOUT LONG-TERM CURRENT USE OF INSULIN (HCC): Primary | ICD-10-CM

## 2022-07-28 DIAGNOSIS — I10 ESSENTIAL HYPERTENSION: ICD-10-CM

## 2022-07-28 DIAGNOSIS — Z79.899 ENCOUNTER FOR LONG-TERM (CURRENT) USE OF MEDICATIONS: ICD-10-CM

## 2022-07-28 DIAGNOSIS — E78.2 MIXED HYPERLIPIDEMIA: ICD-10-CM

## 2022-07-28 DIAGNOSIS — E55.9 VITAMIN D DEFICIENCY: ICD-10-CM

## 2022-07-28 PROCEDURE — 99213 OFFICE O/P EST LOW 20 MIN: CPT | Performed by: FAMILY MEDICINE

## 2022-07-28 PROCEDURE — 3046F HEMOGLOBIN A1C LEVEL >9.0%: CPT | Performed by: FAMILY MEDICINE

## 2022-07-28 RX ORDER — METOPROLOL SUCCINATE 50 MG/1
TABLET, EXTENDED RELEASE ORAL
Qty: 30 TABLET | Refills: 5 | Status: SHIPPED | OUTPATIENT
Start: 2022-07-28

## 2022-07-28 RX ORDER — CHOLECALCIFEROL (VITAMIN D3) 125 MCG
1 CAPSULE ORAL DAILY
Qty: 30 CAPSULE | Refills: 5 | COMMUNITY
Start: 2022-07-28

## 2022-07-28 RX ORDER — EZETIMIBE 10 MG/1
TABLET ORAL
Qty: 30 TABLET | Refills: 5 | Status: SHIPPED | OUTPATIENT
Start: 2022-07-28

## 2022-07-28 NOTE — PROGRESS NOTES
Meryl Varma M.D.  [address] [facility]  [place]  Phone:  [phone]  Fax:  [phone]          I was in the office while conducting this encounter. The patient was at home. CHIEF COMPLAINT:  Chief Complaint   Patient presents with    Diabetes     Her blood sugars are under control. She takes Januvia and Metformin daily as prescribed. Hypertension     She takes Metoprolol daily as prescribed. She is doing well with the medication. Cholesterol Problem     Her cholesterol has been under control. She takes her medication daily as prescribed. HISTORY OF PRESENT ILLNESS:  Ms. Cathryn Osgood is a 65 y.o. female  who presents for follow up. Patient says her blood sugars are under control. She takes Januvia and Metformin daily as prescribed. Her last A1c was 10.2. She takes Metoprolol daily as prescribed. She is doing well with the medication. Denies chest pain, shortness of breath, headaches or blurred vision. Her cholesterol has been under control. She takes her medication daily as prescribed. No other complaints. Taking medications as prescribed. Medications reviewed and updated. HISTORY:  Allergies   Allergen Reactions    Lisinopril Other (See Comments)     cough           REVIEW OF SYSTEMS:  Review of systems is as indicated in HPI otherwise negative.     PHYSICAL EXAM:    Vital Signs: (As obtained by patient/caregiver at home)  Patient-Reported Vitals 7/28/2022   Patient-Reported Weight 169   Patient-Reported Height 54   Patient-Reported Pulse 72   Patient-Reported Temperature 98.3   Patient-Reported SpO2 98           Constitutional: [x] Appears well-developed and well-nourished [x] No apparent distress      [] Abnormal -     Mental status: [x] Alert and awake  [x] Oriented to person/place/time [x] Able to follow commands    [] Abnormal -     Eyes:   EOM    [x]  Normal    [] Abnormal -   Sclera  [x]  Normal    [] Abnormal - Discharge [x]  None visible   [] Abnormal -     HENT: [x] Normocephalic, atraumatic  [] Abnormal -   [x] Mouth/Throat: Mucous membranes are moist    External Ears [x] Normal  [] Abnormal -    Neck: [x] No visualized mass [] Abnormal -     Pulmonary/Chest: [x] Respiratory effort normal   [x] No visualized signs of difficulty breathing or respiratory distress             Musculoskeletal:   [x] Normal gait with no signs of ataxia         [x] Normal range of motion of neck        [] Abnormal -     Neurological:        [x] No Facial Asymmetry (Cranial nerve 7 motor function) (limited exam due to video visit)          [x] No gaze palsy        [] Abnormal -          Skin:        [x] No significant exanthematous lesions or discoloration noted on facial skin         [] Abnormal -            Psychiatric:       [x] Normal Affect [] Abnormal -        [x] No Hallucinations    PHQ:  PHQ-9  3/29/2022   Little interest or pleasure in doing things 0   Total Score PHQ 2 0       LABS    Orders Only on 01/03/2022   Component Date Value Ref Range Status    Hemoglobin A1C 01/03/2022 10.2 (A) 4.8 - 5.6 % Final    Comment:          Prediabetes: 5.7 - 6.4           Diabetes: >6.4           Glycemic control for adults with diabetes: <7.0      WBC 01/03/2022 5.5  3.4 - 10.8 x10E3/uL Final    RBC 01/03/2022 4.64  3.77 - 5.28 x10E6/uL Final    Hemoglobin 01/03/2022 14.9  11.1 - 15.9 g/dL Final    Hematocrit 01/03/2022 43.2  34.0 - 46.6 % Final    MCV 01/03/2022 93  79 - 97 fL Final    MCH 01/03/2022 32.1  26.6 - 33.0 pg Final    MCHC 01/03/2022 34.5  31.5 - 35.7 g/dL Final    RDW 01/03/2022 11.8  11.7 - 15.4 % Final    Platelets 24/12/9855 146 (A) 150 - 450 x10E3/uL Final    Neutrophils % 01/03/2022 43  Not Estab. % Final    Lymphocytes % 01/03/2022 46  Not Estab. % Final    Monocytes % 01/03/2022 8  Not Estab. % Final    Eosinophils % 01/03/2022 2  Not Estab. % Final    Basophils % 01/03/2022 1  Not Estab. % Final    Neutrophils Absolute 01/03/2022 2.4  1.4 - 7.0 x10E3/uL Final    Lymphocytes Absolute 01/03/2022 2.5  0.7 - 3.1 x10E3/uL Final    Monocytes Absolute 01/03/2022 0.4  0.1 - 0.9 x10E3/uL Final    Eosinophils Absolute 01/03/2022 0.1  0.0 - 0.4 x10E3/uL Final    Basophils Absolute 01/03/2022 0.1  0.0 - 0.2 x10E3/uL Final    Immature Granulocytes 01/03/2022 0  Not Estab. % Final    GRANULOCYTE ABSOLUTE COUNT 01/03/2022 0.0  0.0 - 0.1 x10E3/uL Final    Vit D, 25-Hydroxy 01/03/2022 56.4  30.0 - 100.0 ng/mL Final    Comment: Vitamin D deficiency has been defined by the [address] practice guideline as a  level of serum 25-OH vitamin D less than 20 ng/mL (1,2). The Endocrine Society went on to further define vitamin D  insufficiency as a level between 21 and 29 ng/mL (2). 1. IOM ([place] of Medicine). 2010. Dietary reference     intakes for calcium and D. [address] [facility]: The     [employer]. 2. Kishan MF, Chelsea NC, Richard GEE, et al.     Evaluation, treatment, and prevention of vitamin D     deficiency: an Endocrine Society clinical practice     guideline. JCEM. 2011 Jul; 96(7):1911-30. Cholesterol, Total 01/03/2022 212 (A) 100 - 199 mg/dL Final    Triglycerides 01/03/2022 292 (A) 0 - 149 mg/dL Final    HDL 01/03/2022 38 (A) >39 mg/dL Final    VLDL 01/03/2022 52 (A) 5 - 40 mg/dL Final    LDL Calculated 01/03/2022 122 (A) 0 - 99 mg/dL Final    LDl/HDL Ratio 01/03/2022 3.2  0.0 - 3.2 ratio Final    Comment:                                     LDL/HDL Ratio                                              Men  Women                                1/2 Avg. Risk  1.0    1.5                                    Avg.Risk  3.6    3.2                                 2X Avg. Risk  6.2    5.0                                 3X Avg. Risk  8.0    6.1      Glucose 01/03/2022 219 (A) 65 - 99 mg/dL Final    BUN 01/03/2022 13  8 - 27 mg/dL Final    Creatinine 01/03/2022 0.78  0.57 - 1.00 mg/dL Final EGFR IF NonAfrican American 01/03/2022 83  >59 mL/min/1.73 Final    GFR  01/03/2022 96  >59 mL/min/1.73 Final    Comment: **In accordance with recommendations from the NKF-ASN Task force,**    [facility] is in the process of updating its eGFR calculation to the    2021 CKD-EPI creatinine equation that estimates kidney function    without a race variable. Bun/Cre Ratio 01/03/2022 17  12 - 28 NA Final    Sodium 01/03/2022 136  134 - 144 mmol/L Final    Potassium 01/03/2022 4.4  3.5 - 5.2 mmol/L Final    Chloride 01/03/2022 100  96 - 106 mmol/L Final    CO2 01/03/2022 23  20 - 29 mmol/L Final    Calcium 01/03/2022 9.9  8.7 - 10.3 mg/dL Final    Total Protein 01/03/2022 7.9  6.0 - 8.5 g/dL Final    Albumin 01/03/2022 4.5  3.8 - 4.9 g/dL Final    Globulin, Total 01/03/2022 3.4  1.5 - 4.5 g/dL Final    Albumin/Globulin Ratio 01/03/2022 1.3  1.2 - 2.2 NA Final    Total Bilirubin 01/03/2022 0.5  0.0 - 1.2 mg/dL Final    Alkaline Phosphatase 01/03/2022 108  44 - 121 IU/L Final                  **Please note reference interval change**    AST 01/03/2022 60 (A) 0 - 40 IU/L Final    ALT 01/03/2022 71 (A) 0 - 32 IU/L Final       IMPRESSION/PLAN     Diagnosis Orders   1. Controlled type 2 diabetes mellitus without complication, without long-term current use of insulin (HCC)  metFORMIN (GLUCOPHAGE) 1000 MG tablet    SITagliptin (JANUVIA) 100 MG tablet      2. Essential hypertension  metoprolol succinate (TOPROL XL) 50 MG extended release tablet      3. Mixed hyperlipidemia  ezetimibe (ZETIA) 10 MG tablet      4. Vitamin D deficiency  Cholecalciferol (VITAMIN D) 125 MCG (5000 UT) CAPS      5. Encounter for long-term (current) use of medications            Follow up and Dispositions:  Return in about 4 months (around 11/28/2022). Patient will continue current medications. Refilled the above medications. Reviewed medications and side effects in detail. Reviewed most recent labs.   Reviewed diet, exercise and weight control. Cardiovascular risks and recommendations reviewed. Patient encouraged to follow a diabetic/low sodium diet. Use of aspirin to prevent MIs and TIAs discussed. Patient will check blood sugars once daily. Consent: This patient and/or their healthcare decision maker is aware that this patient-initiated Telehealth encounter is a billable service, with coverage as determined by their insurance carrier. Patient is aware that they may receive a bill and has provided verbal consent to proceed: Yes     The patient is being evaluated by a Virtual Visit (video visit) encounter to address concerns as mentioned above. A caregiver was present when appropriate. Due to this being a TeleHealth encounter (During FAETG-35 public health emergency), evaluation of the following organ systems was limited: Vitals/Constitutional/EENT/Resp/CV/GI//MS/Neuro/Skin/Heme-Lymph-Imm. Pursuant to the emergency declaration under the [address] [place] [address], [address] [place] [address] authority and the [employer] and [employer] Act, this Virtual Visit was conducted with patient's (and/or legal guardian's) consent, to reduce the patient's risk of exposure to COVID-19 and provide necessary medical care. The patient (and/or legal guardian) has also been advised to contact this office for worsening conditions or problems, and seek emergency medical treatment and/or call 911 if deemed necessary. Patient identification was verified at the start of the visit: YES    Services were provided through a video synchronous discussion virtually to substitute for in-person clinic visit. Patient and provider were located at their individual homes. Parag Lundborg, was evaluated through a synchronous (real-time) audio-video encounter. The patient (or guardian if applicable) is aware that this is a billable service, which includes applicable co-pays.  This Virtual Visit was conducted with patient's (and/or legal guardian's) consent. The visit was conducted pursuant to the emergency declaration under the [facility] [address] and the [employer] and [employer] Act. Patient identification was verified, and a caregiver was present when appropriate. The patient was located at Home: [address] [place] [address]. [address] [facility] [address]. Provider was located at [facility] ([facility]): [address].. Total Time: minutes: 11-20 minutes. Dictated using voice recognition software.  Proofread, but unrecognized voice recognition errors may exist.    Soy Castellano MD  08/01/22

## 2022-08-10 ENCOUNTER — PATIENT MESSAGE (OUTPATIENT)
Dept: PRIMARY CARE CLINIC | Facility: CLINIC | Age: 62
End: 2022-08-10

## 2022-08-10 DIAGNOSIS — H16.139 ACTINIC KERATITIS, UNSPECIFIED LATERALITY: Primary | ICD-10-CM

## 2022-08-11 NOTE — TELEPHONE ENCOUNTER
From: Vasquez Stanford  To: Dr. Doyle Cornea: 8/10/2022 9:23 PM EDT  Subject: Dermatologist     Dr. Hinkle Record could you possibly recommend a dermatologist? I have what I think are areas of keratosis that I need evaluated.   Thanks,  Have a good weekend  [facility]

## 2022-08-19 ENCOUNTER — OFFICE VISIT (OUTPATIENT)
Dept: PRIMARY CARE CLINIC | Facility: CLINIC | Age: 62
End: 2022-08-19
Payer: COMMERCIAL

## 2022-08-19 VITALS
BODY MASS INDEX: 29.61 KG/M2 | SYSTOLIC BLOOD PRESSURE: 117 MMHG | WEIGHT: 172.5 LBS | DIASTOLIC BLOOD PRESSURE: 46 MMHG | HEART RATE: 66 BPM | TEMPERATURE: 98.1 F

## 2022-08-19 DIAGNOSIS — E11.9 CONTROLLED TYPE 2 DIABETES MELLITUS WITHOUT COMPLICATION, WITHOUT LONG-TERM CURRENT USE OF INSULIN (HCC): Primary | ICD-10-CM

## 2022-08-19 DIAGNOSIS — N39.0 RECURRENT UTI: ICD-10-CM

## 2022-08-19 DIAGNOSIS — E11.9 CONTROLLED TYPE 2 DIABETES MELLITUS WITHOUT COMPLICATION, WITHOUT LONG-TERM CURRENT USE OF INSULIN (HCC): ICD-10-CM

## 2022-08-19 DIAGNOSIS — R30.0 DYSURIA: ICD-10-CM

## 2022-08-19 DIAGNOSIS — F51.01 PRIMARY INSOMNIA: ICD-10-CM

## 2022-08-19 LAB
BILIRUBIN, URINE, POC: NEGATIVE
BLOOD URINE, POC: ABNORMAL
CREAT UR-MCNC: 84 MG/DL
GLUCOSE URINE, POC: ABNORMAL
KETONES, URINE, POC: NEGATIVE
LEUKOCYTE ESTERASE, URINE, POC: ABNORMAL
MICROALBUMIN UR-MCNC: 2.92 MG/DL
MICROALBUMIN/CREAT UR-RTO: 35 MG/G
NITRITE, URINE, POC: POSITIVE
PH, URINE, POC: 0.2 (ref 4.6–8)
PROTEIN,URINE, POC: NEGATIVE
SPECIFIC GRAVITY, URINE, POC: 1.02 (ref 1–1.03)
URINALYSIS CLARITY, POC: ABNORMAL
URINALYSIS COLOR, POC: YELLOW
UROBILINOGEN, POC: 0.2

## 2022-08-19 PROCEDURE — 81003 URINALYSIS AUTO W/O SCOPE: CPT | Performed by: FAMILY MEDICINE

## 2022-08-19 PROCEDURE — 99213 OFFICE O/P EST LOW 20 MIN: CPT | Performed by: FAMILY MEDICINE

## 2022-08-19 PROCEDURE — 3046F HEMOGLOBIN A1C LEVEL >9.0%: CPT | Performed by: FAMILY MEDICINE

## 2022-08-19 RX ORDER — FLUCONAZOLE 150 MG/1
150 TABLET ORAL ONCE
Qty: 1 TABLET | Refills: 0 | Status: SHIPPED | OUTPATIENT
Start: 2022-08-19 | End: 2022-08-19

## 2022-08-19 RX ORDER — SULFAMETHOXAZOLE AND TRIMETHOPRIM 800; 160 MG/1; MG/1
1 TABLET ORAL 2 TIMES DAILY
Qty: 10 TABLET | Refills: 0 | Status: SHIPPED | OUTPATIENT
Start: 2022-08-19 | End: 2022-08-24

## 2022-08-19 RX ORDER — TEMAZEPAM 15 MG/1
CAPSULE ORAL
Qty: 30 CAPSULE | Refills: 3 | Status: SHIPPED | OUTPATIENT
Start: 2022-08-19 | End: 2022-09-19

## 2022-08-19 SDOH — ECONOMIC STABILITY: FOOD INSECURITY: WITHIN THE PAST 12 MONTHS, YOU WORRIED THAT YOUR FOOD WOULD RUN OUT BEFORE YOU GOT MONEY TO BUY MORE.: PATIENT DECLINED

## 2022-08-19 SDOH — ECONOMIC STABILITY: FOOD INSECURITY: WITHIN THE PAST 12 MONTHS, THE FOOD YOU BOUGHT JUST DIDN'T LAST AND YOU DIDN'T HAVE MONEY TO GET MORE.: PATIENT DECLINED

## 2022-08-19 ASSESSMENT — PATIENT HEALTH QUESTIONNAIRE - PHQ9
SUM OF ALL RESPONSES TO PHQ9 QUESTIONS 1 & 2: 0
SUM OF ALL RESPONSES TO PHQ QUESTIONS 1-9: 0
1. LITTLE INTEREST OR PLEASURE IN DOING THINGS: 0
2. FEELING DOWN, DEPRESSED OR HOPELESS: 0
SUM OF ALL RESPONSES TO PHQ QUESTIONS 1-9: 0

## 2022-08-19 ASSESSMENT — SOCIAL DETERMINANTS OF HEALTH (SDOH): HOW HARD IS IT FOR YOU TO PAY FOR THE VERY BASICS LIKE FOOD, HOUSING, MEDICAL CARE, AND HEATING?: PATIENT DECLINED

## 2022-08-19 NOTE — PROGRESS NOTES
Michael Mccarty M.D.  [address] [facility]  [place]  Phone:  [phone]  Fax:  [phone]:  Chief Complaint   Patient presents with    Urinary Tract Infection     Pt c/o urgency, frequency, pressure in bladder since Tuesday. She has been testing at home at it was positive for leukocytes and nitrites. Denies hematuria or fever     Insomnia     Pt c/o insomnia, requests refill of Temazepam.           HISTORY OF PRESENT ILLNESS:  Ms. Filipe Sy is a 65 y.o. female  who presents with complaints of increased urine frequency x 3 days. She has pelvic pressure. She did an at home urine test and it was positive for leukocytes and nitrites. She denies any fever or chills. She continues to take Temazepam for insomnia. The medication is helping. No other complaints. Taking medications as prescribed. Medications reviewed and updated. HISTORY:  Allergies   Allergen Reactions    Lisinopril Other (See Comments)     cough           REVIEW OF SYSTEMS:  Review of systems is as indicated in HPI otherwise negative. PHYSICAL EXAM:  Vital Signs -   Visit Vitals  BP (!) 117/46   Pulse 66   Temp 98.1 °F (36.7 °C) (Temporal)   Wt 172 lb 8 oz (78.2 kg)   PF 98 L/min   BMI 29.61 kg/m²        Physical Exam  Vitals and nursing note reviewed. Constitutional:       Appearance: Normal appearance. HENT:      Head: Normocephalic and atraumatic. Nose: Nose normal.      Mouth/Throat:      Mouth: Mucous membranes are moist.   Eyes:      Extraocular Movements: Extraocular movements intact. Pupils: Pupils are equal, round, and reactive to light. Cardiovascular:      Rate and Rhythm: Normal rate and regular rhythm. Pulses: Normal pulses. Pulmonary:      Effort: Pulmonary effort is normal.      Breath sounds: Normal breath sounds. Abdominal:      General: Abdomen is flat. Palpations: Abdomen is soft.    Musculoskeletal:         General: Normal range of motion. Cervical back: Normal range of motion and neck supple. Skin:     General: Skin is warm and dry. Neurological:      Mental Status: She is alert.    Psychiatric:         Mood and Affect: Mood normal.         Behavior: Behavior normal.         PHQ:  PHQ-9  8/19/2022   Little interest or pleasure in doing things 0   Little interest or pleasure in doing things -   Feeling down, depressed, or hopeless 0   PHQ-2 Score 0   Total Score PHQ 2 -   PHQ-9 Total Score 0       LABS  Results for orders placed or performed in visit on 08/19/22   AMB POC URINALYSIS DIP STICK AUTO W/O MICRO   Result Value Ref Range    Color, Urine, POC yellow     Clarity, Urine, POC cloudy     Glucose, Urine, POC 3+ Negative    Bilirubin, Urine, POC Negative Negative    Ketones, Urine, POC Negative Negative    Specific Gravity, Urine, POC 1.025 1.001 - 1.035    Blood, Urine, POC trace Negative    pH, Urine, POC 0.2 (A) 4.6 - 8.0    Protein, Urine, POC Negative Negative    Urobilinogen, POC 0.2     Nitrate, Urine, POC positive Negative    Leukocyte Esterase, Urine, POC Trace Negative     Orders Only on 01/03/2022   Component Date Value Ref Range Status    Hemoglobin A1C 01/03/2022 10.2 (A) 4.8 - 5.6 % Final    Comment:          Prediabetes: 5.7 - 6.4           Diabetes: >6.4           Glycemic control for adults with diabetes: <7.0      WBC 01/03/2022 5.5  3.4 - 10.8 x10E3/uL Final    RBC 01/03/2022 4.64  3.77 - 5.28 x10E6/uL Final    Hemoglobin 01/03/2022 14.9  11.1 - 15.9 g/dL Final    Hematocrit 01/03/2022 43.2  34.0 - 46.6 % Final    MCV 01/03/2022 93  79 - 97 fL Final    MCH 01/03/2022 32.1  26.6 - 33.0 pg Final    MCHC 01/03/2022 34.5  31.5 - 35.7 g/dL Final    RDW 01/03/2022 11.8  11.7 - 15.4 % Final    Platelets 80/00/7448 146 (A) 150 - 450 x10E3/uL Final    Neutrophils % 01/03/2022 43  Not Estab. % Final    Lymphocytes % 01/03/2022 46  Not Estab. % Final    Monocytes % 01/03/2022 8  Not Estab. % Final    Eosinophils % 01/03/2022 2  Not Estab. % Final    Basophils % 01/03/2022 1  Not Estab. % Final    Neutrophils Absolute 01/03/2022 2.4  1.4 - 7.0 x10E3/uL Final    Lymphocytes Absolute 01/03/2022 2.5  0.7 - 3.1 x10E3/uL Final    Monocytes Absolute 01/03/2022 0.4  0.1 - 0.9 x10E3/uL Final    Eosinophils Absolute 01/03/2022 0.1  0.0 - 0.4 x10E3/uL Final    Basophils Absolute 01/03/2022 0.1  0.0 - 0.2 x10E3/uL Final    Immature Granulocytes 01/03/2022 0  Not Estab. % Final    GRANULOCYTE ABSOLUTE COUNT 01/03/2022 0.0  0.0 - 0.1 x10E3/uL Final    Vit D, 25-Hydroxy 01/03/2022 56.4  30.0 - 100.0 ng/mL Final    Comment: Vitamin D deficiency has been defined by the [address] practice guideline as a  level of serum 25-OH vitamin D less than 20 ng/mL (1,2). The Endocrine Society went on to further define vitamin D  insufficiency as a level between 21 and 29 ng/mL (2). 1. IOM ([place] of Medicine). 2010. Dietary reference     intakes for calcium and D. [address] [facility]: The     [employer]. 2. Kishan MF, Chelsea ALVARADO, Richard GEE, et al.     Evaluation, treatment, and prevention of vitamin D     deficiency: an Endocrine Society clinical practice     guideline. JCEM. 2011 Jul; 96(7):1911-30. Cholesterol, Total 01/03/2022 212 (A) 100 - 199 mg/dL Final    Triglycerides 01/03/2022 292 (A) 0 - 149 mg/dL Final    HDL 01/03/2022 38 (A) >39 mg/dL Final    VLDL 01/03/2022 52 (A) 5 - 40 mg/dL Final    LDL Calculated 01/03/2022 122 (A) 0 - 99 mg/dL Final    LDl/HDL Ratio 01/03/2022 3.2  0.0 - 3.2 ratio Final    Comment:                                     LDL/HDL Ratio                                              Men  Women                                1/2 Avg. Risk  1.0    1.5                                    Avg.Risk  3.6    3.2                                 2X Avg. Risk  6.2    5.0                                 3X Avg. Risk  8.0    6.1      Glucose 01/03/2022 219 (A) 65 - 99 mg/dL Final    BUN 01/03/2022 13  8 - 27 mg/dL Final    Creatinine 01/03/2022 0.78  0.57 - 1.00 mg/dL Final    EGFR IF NonAfrican American 01/03/2022 83  >59 mL/min/1.73 Final    GFR  01/03/2022 96  >59 mL/min/1.73 Final    Comment: **In accordance with recommendations from the NKF-ASN Task force,**    [facility] is in the process of updating its eGFR calculation to the    2021 CKD-EPI creatinine equation that estimates kidney function    without a race variable. Bun/Cre Ratio 01/03/2022 17  12 - 28 NA Final    Sodium 01/03/2022 136  134 - 144 mmol/L Final    Potassium 01/03/2022 4.4  3.5 - 5.2 mmol/L Final    Chloride 01/03/2022 100  96 - 106 mmol/L Final    CO2 01/03/2022 23  20 - 29 mmol/L Final    Calcium 01/03/2022 9.9  8.7 - 10.3 mg/dL Final    Total Protein 01/03/2022 7.9  6.0 - 8.5 g/dL Final    Albumin 01/03/2022 4.5  3.8 - 4.9 g/dL Final    Globulin, Total 01/03/2022 3.4  1.5 - 4.5 g/dL Final    Albumin/Globulin Ratio 01/03/2022 1.3  1.2 - 2.2 NA Final    Total Bilirubin 01/03/2022 0.5  0.0 - 1.2 mg/dL Final    Alkaline Phosphatase 01/03/2022 108  44 - 121 IU/L Final                  **Please note reference interval change**    AST 01/03/2022 60 (A) 0 - 40 IU/L Final    ALT 01/03/2022 71 (A) 0 - 32 IU/L Final       IMPRESSION/PLAN     Diagnosis Orders   1. Controlled type 2 diabetes mellitus without complication, without long-term current use of insulin (HCC)  Microalbumin / Creatinine Urine Ratio      2. Dysuria  AMB POC URINALYSIS DIP STICK AUTO W/O MICRO    Culture, Urine    sulfamethoxazole-trimethoprim (BACTRIM DS;SEPTRA DS) 800-160 MG per tablet      3. Recurrent UTI  sulfamethoxazole-trimethoprim (BACTRIM DS;SEPTRA DS) 800-160 MG per tablet      4. Primary insomnia  temazepam (RESTORIL) 15 MG capsule          Follow up and Dispositions:  Return in about 4 months (around 12/19/2022). Patient will continue current medications. Refilled the above medications.   Will add the following medications: Bactrim DS bid x 5 days. Reviewed medications and side effects in detail. Reviewed most recent labs. [facility] PRESCRIPTION DRUG MONITORING SEARCH DONE. PATIENT IS IN COMPLIANCE. Antonio Red MD    Dictated using voice recognition software.  Proofread, but unrecognized voice recognition errors may exist.

## 2022-08-21 LAB
BACTERIA SPEC CULT: ABNORMAL
BACTERIA SPEC CULT: ABNORMAL
SERVICE CMNT-IMP: ABNORMAL

## 2022-08-26 NOTE — RESULT ENCOUNTER NOTE
Urine culture grew E coli which is sensitive to Bactrim DS. She was given Bactrim for her UTI. Patient notified via [address]

## 2022-09-07 ENCOUNTER — PATIENT MESSAGE (OUTPATIENT)
Dept: PRIMARY CARE CLINIC | Facility: CLINIC | Age: 62
End: 2022-09-07

## 2022-09-07 DIAGNOSIS — N39.0 RECURRENT UTI: Primary | ICD-10-CM

## 2022-09-09 NOTE — TELEPHONE ENCOUNTER
From: Tacos Sullivan  To: Dr. Marc Chavez: 9/7/2022 11:11 PM EDT  Subject: UTI symptoms     Seen and treated on Aug.24 with Bactrim DS x 5 days. At home urine dip test strip has continued to test pos for leukocytes after treatment but not nitrites. Although, urine did test pos for nitrites again tonight. Symptoms have never really seemed to resolve completely. Could I try a different round of antibiotic? My urologist in [facility] had me on macrobid, self treatment, for about 2 years and is no longer effective. Also, even though I have already had a cystoscope with no real reason for my repeated infections, its been a few years. Maybe its time for me to establish with a urologist locally.  I would appreciate a referral.   Thanks, Graciela Dewitt

## 2022-09-15 ENCOUNTER — TELEPHONE (OUTPATIENT)
Dept: PRIMARY CARE CLINIC | Facility: CLINIC | Age: 62
End: 2022-09-15

## 2022-09-15 NOTE — TELEPHONE ENCOUNTER
Pt called in stating that she dropped off a urine sample last Thursday and has not heard anything back about it.  Please call her back

## 2022-09-16 ENCOUNTER — NURSE ONLY (OUTPATIENT)
Dept: PRIMARY CARE CLINIC | Facility: CLINIC | Age: 62
End: 2022-09-16
Payer: COMMERCIAL

## 2022-09-16 DIAGNOSIS — R30.0 DYSURIA: Primary | ICD-10-CM

## 2022-09-16 DIAGNOSIS — R30.0 DYSURIA: ICD-10-CM

## 2022-09-16 LAB
BILIRUBIN, URINE, POC: NEGATIVE
BLOOD URINE, POC: ABNORMAL
GLUCOSE URINE, POC: 250
KETONES, URINE, POC: NEGATIVE
LEUKOCYTE ESTERASE, URINE, POC: ABNORMAL
NITRITE, URINE, POC: POSITIVE
PH, URINE, POC: 5.5 (ref 4.6–8)
PROTEIN,URINE, POC: ABNORMAL
SPECIFIC GRAVITY, URINE, POC: 1.03 (ref 1–1.03)
URINALYSIS CLARITY, POC: CLEAR
URINALYSIS COLOR, POC: ABNORMAL
UROBILINOGEN, POC: 0.2

## 2022-09-16 PROCEDURE — 81003 URINALYSIS AUTO W/O SCOPE: CPT | Performed by: FAMILY MEDICINE

## 2022-09-16 RX ORDER — CIPROFLOXACIN 500 MG/1
500 TABLET, FILM COATED ORAL 2 TIMES DAILY
Qty: 20 TABLET | Refills: 0 | Status: SHIPPED | OUTPATIENT
Start: 2022-09-16 | End: 2022-09-26

## 2022-09-18 LAB
BACTERIA SPEC CULT: ABNORMAL
BACTERIA SPEC CULT: ABNORMAL
SERVICE CMNT-IMP: ABNORMAL

## 2022-09-23 RX ORDER — SULFAMETHOXAZOLE AND TRIMETHOPRIM 800; 160 MG/1; MG/1
1 TABLET ORAL 2 TIMES DAILY
Qty: 14 TABLET | Refills: 0 | Status: SHIPPED | OUTPATIENT
Start: 2022-09-23 | End: 2022-09-30

## 2022-09-23 RX ORDER — PHENAZOPYRIDINE HYDROCHLORIDE 200 MG/1
200 TABLET, FILM COATED ORAL 3 TIMES DAILY PRN
Qty: 9 TABLET | Refills: 0 | Status: SHIPPED | OUTPATIENT
Start: 2022-09-23 | End: 2022-09-26

## 2022-09-23 RX ORDER — FLUCONAZOLE 150 MG/1
150 TABLET ORAL ONCE
Qty: 1 TABLET | Refills: 0 | Status: SHIPPED | OUTPATIENT
Start: 2022-09-23 | End: 2022-09-23

## 2022-10-20 ENCOUNTER — OFFICE VISIT (OUTPATIENT)
Dept: UROLOGY | Age: 62
End: 2022-10-20
Payer: COMMERCIAL

## 2022-10-20 DIAGNOSIS — B37.31 YEAST VAGINITIS: ICD-10-CM

## 2022-10-20 DIAGNOSIS — N39.0 RECURRENT UTI: Primary | ICD-10-CM

## 2022-10-20 LAB
BILIRUBIN, URINE, POC: NEGATIVE
BLOOD URINE, POC: ABNORMAL
GLUCOSE URINE, POC: 250
KETONES, URINE, POC: NEGATIVE
LEUKOCYTE ESTERASE, URINE, POC: ABNORMAL
NITRITE, URINE, POC: POSITIVE
PH, URINE, POC: 5.5 (ref 4.6–8)
PROTEIN,URINE, POC: NEGATIVE
PVR, POC: ABNORMAL CC
SPECIFIC GRAVITY, URINE, POC: 1.03 (ref 1–1.03)
URINALYSIS CLARITY, POC: ABNORMAL
URINALYSIS COLOR, POC: ABNORMAL
UROBILINOGEN, POC: ABNORMAL

## 2022-10-20 PROCEDURE — 51798 US URINE CAPACITY MEASURE: CPT | Performed by: NURSE PRACTITIONER

## 2022-10-20 PROCEDURE — 99204 OFFICE O/P NEW MOD 45 MIN: CPT | Performed by: NURSE PRACTITIONER

## 2022-10-20 PROCEDURE — 81003 URINALYSIS AUTO W/O SCOPE: CPT | Performed by: NURSE PRACTITIONER

## 2022-10-20 RX ORDER — FLUCONAZOLE 150 MG/1
TABLET ORAL
Qty: 2 TABLET | Refills: 0 | Status: SHIPPED | OUTPATIENT
Start: 2022-10-20

## 2022-10-20 RX ORDER — NITROFURANTOIN 25; 75 MG/1; MG/1
100 CAPSULE ORAL 2 TIMES DAILY
Qty: 14 CAPSULE | Refills: 0 | Status: SHIPPED | OUTPATIENT
Start: 2022-10-20 | End: 2022-10-27

## 2022-10-20 ASSESSMENT — ENCOUNTER SYMPTOMS
EYES NEGATIVE: 1
RESPIRATORY NEGATIVE: 1
BACK PAIN: 1

## 2022-10-20 NOTE — PROGRESS NOTES
[facility]  [address] [facility]    Lisbeth Squibb [address], [address] [facility]  Gretta Corley  : 1960    Chief Complaint   Patient presents with    New Patient     Recurrent uti           HPI     Javad Valero is a 65 y.o. female w hx of DM2 and breast cancer being seen today as a new pt referred by Dr. Laura Ny for recurrent UTI. Upon chart review there are 2 positive E. Coli urine cultures in Aug and 2022. She reports UTI for many yrs. Sx include UU and UF. She saw a urologist in [facility]. Reports she had a cysto approx 4-5 yrs ago that showed \"inflammation\". Has tried D-mannose and estrogen cream wo success. Has diarrhea w metformin. Often gets yeast infections w antibiotics. Denies urine leakage or pad use. MARIAA in  revealed a left renal cyst.     No family hx of  Ca. Former smoker. Cr 0.78. History reviewed. No pertinent past medical history. Past Surgical History:   Procedure Laterality Date    BREAST LUMPECTOMY Right 2009    BREAST RECONSTRUCTION  2020    BSI PB GASTRIC BYPASS SP  2017    Lap band    CERVICAL LAMINECTOMY  2016    C2 to C7     LUMBAR DISCECTOMY  2008    L4/L5    MASTECTOMY, BILATERAL      OTHER SURGICAL HISTORY Bilateral 2008    bilateral tear ducts -  and       Current Outpatient Medications   Medication Sig Dispense Refill    nitrofurantoin, macrocrystal-monohydrate, (MACROBID) 100 MG capsule Take 1 capsule by mouth 2 times daily for 7 days 14 capsule 0    fluconazole (DIFLUCAN) 150 MG tablet Take 1 tab after macrobid is complete.  Repeat in 3 days 2 tablet 0    metFORMIN (GLUCOPHAGE) 1000 MG tablet TAKE 1 TABLET BY MOUTH TWICE DAILY WITH MEALS 60 tablet 5    ezetimibe (ZETIA) 10 MG tablet Take 1 tablet by mouth once daily 30 tablet 5    metoprolol succinate (TOPROL XL) 50 MG extended release tablet TAKE 1 TABLET BY MOUTH ONCE DAILY FOR HIGH BLOOD PRESSURE 30 tablet 5    SITagliptin (JANUVIA) 100 MG tablet Take 1 tablet by mouth in the morning. 30 tablet 5    Cholecalciferol (VITAMIN D) 125 MCG (5000 UT) CAPS Take 1 caplet by mouth daily 30 capsule 5    temazepam (RESTORIL) 15 MG capsule TAKE 1 CAPSULE BY MOUTH NIGHTLY AS NEEDED FOR SLEEP 30 capsule 3    diazePAM (VALIUM) 10 MG tablet Take 10 mg by mouth. traMADol (ULTRAM) 50 MG tablet TAKE 1 TABLET BY MOUTH EVERY 6 HOURS AS NEEDED FOR PAIN FOR UP TO 30 DAYS (SHOULDER PAIN)      meloxicam (MOBIC) 15 MG tablet Take 1 tablet by mouth daily 30 tablet 0    docusate (COLACE, DULCOLAX) 100 MG CAPS Take 100 mg by mouth 2 times daily       No current facility-administered medications for this visit. Allergies   Allergen Reactions    Lisinopril Other (See Comments)     cough      Macrobid [Nitrofurantoin] Other (See Comments)     Macrobid not effective to treat UTI     Social History     Socioeconomic History    Marital status: Single     Spouse name: Not on file    Number of children: Not on file    Years of education: Not on file    Highest education level: Not on file   Occupational History    Not on file   Tobacco Use    Smoking status: Former     Types: Cigarettes     Quit date: 2017     Years since quittin.8    Smokeless tobacco: Never   Substance and Sexual Activity    Alcohol use: Not Currently    Drug use: Never    Sexual activity: Not on file   Other Topics Concern    Not on file   Social History Narrative    Not on file     Social Determinants of Health     Financial Resource Strain: Unknown    Difficulty of Paying Living Expenses: Patient refused   Food Insecurity: Unknown    Worried About Running Out of Food in the Last Year: Patient refused    Ran Out of Food in the Last Year: Patient refused   Transportation Needs: Not on file   Physical Activity: Not on file   Stress: Not on file   Social Connections: Not on file   Intimate Partner Violence: Not on file   Housing Stability: Not on file     History reviewed.  No pertinent family history. Review of Systems  Constitutional: Negative  Skin: Negative skin ROS  Eyes: Eyes negative  ENT: HENT negative  Respiratory: Respiratory negative  Cardiovascular: Neg cardio ROSPositive for hypertension. GI: Neg GI ROS  Genitourinary: Positive for recurrent UTIs, urgency and frequent urination. Number of pregnancies: 2. Number of births: 2. Musculoskeletal: Positive for back pain and neck pain.   Psychological: Neg psych ROS  Endocrine: Endocrine negative  Hem/Lymphatic: Hematologic/lymphatic negative    Urinalysis  UA - Dipstick  Results for orders placed or performed in visit on 10/20/22   AMB POC URINALYSIS DIP STICK AUTO W/O MICRO   Result Value Ref Range    Color (UA POC)      Clarity (UA POC)      Glucose, Urine,  Negative    Bilirubin, Urine, POC Negative Negative    KETONES, Urine, POC Negative Negative    Specific Gravity, Urine, POC 1.030 1.001 - 1.035    Blood (UA POC) Trace-intact Negative    pH, Urine, POC 5.5 4.6 - 8.0    Protein, Urine, POC Negative Negative    Urobilinogen, POC 2 mg/dL     Nitrite, Urine, POC Positive Negative    Leukocyte Esterase, Urine, POC Small Negative   Results for orders placed or performed in visit on 09/16/22   AMB POC URINALYSIS DIP STICK AUTO W/O MICRO   Result Value Ref Range    Color, Urine, POC dark yellow     Clarity, Urine, POC clear     Glucose, Urine,  Negative    Bilirubin, Urine, POC Negative Negative    Ketones, Urine, POC Negative Negative    Specific Gravity, Urine, POC 1.030 1.001 - 1.035    Blood, Urine, POC trace Negative    pH, Urine, POC 5.5 4.6 - 8.0    Protein, Urine, POC 2+ Negative    Urobilinogen, POC 0.2     Nitrate, Urine, POC positive Negative    Leukocyte Esterase, Urine, POC 1+ Negative       PHYSICAL EXAM    General appearance - well appearing and in no distress  Mental status - alert, oriented to person, place, and time  Neck - supple, no significant adenopathy  Chest/Lung-  Quiet, even and easy respiratory effort without use of accessory muscles  Skin - normal coloration and turgor, no rashes        Assessment and Plan    ICD-10-CM    1. Recurrent UTI  N39.0 AMB POC PVR, IDANIA,POST-VOID RES,US,NON-IMAGING     AMB POC URINALYSIS DIP STICK AUTO W/O MICRO     Culture, Urine     nitrofurantoin, macrocrystal-monohydrate, (MACROBID) 100 MG capsule     US RETROPERITONEAL COMPLETE      2. Yeast vaginitis  B37.31 fluconazole (DIFLUCAN) 150 MG tablet        Will update imaging w MARIAA. Will call results. UA appears infected today. Start macrobid. Culture sent. She will begin suppression once macrobid is completed. Will await urine culture results before suppression antibiotic is sent. I have educated patient to behavioral changes that can decrease the frequency of any urinary infection. These include eliminating constipation, front to back wiping, frequent voiding to keep bladder volumes low, double voiding, avoid soaking in water (including baths, pools, hot tubs), avoiding tight fitting clothes, avoiding douching, use of cranberry products, and use of probiotics. I encouraged consuming minimum of 64 oz of water daily. I also recommend avoiding consumption of sugary beverages and other known bladder irritants. Suggested U -Jeffrey wipes. Diflucan sent. Advised to start daily probiotic. F/u pending testing. To call sooner if needed. Teresa Camejo is supervising physician today and he approves plan of care.

## 2022-10-23 LAB
BACTERIA SPEC CULT: ABNORMAL
BACTERIA SPEC CULT: ABNORMAL
SERVICE CMNT-IMP: ABNORMAL

## 2022-10-24 RX ORDER — CEPHALEXIN 250 MG/1
250 CAPSULE ORAL DAILY
Qty: 90 CAPSULE | Refills: 0 | Status: SHIPPED | OUTPATIENT
Start: 2022-10-24

## 2022-10-24 RX ORDER — CEPHALEXIN 500 MG/1
500 CAPSULE ORAL 3 TIMES DAILY
Qty: 21 CAPSULE | Refills: 0 | Status: SHIPPED | OUTPATIENT
Start: 2022-10-24 | End: 2022-10-31

## 2022-10-28 ENCOUNTER — HOSPITAL ENCOUNTER (OUTPATIENT)
Dept: ULTRASOUND IMAGING | Age: 62
Discharge: HOME | End: 2022-10-31
Payer: COMMERCIAL

## 2022-10-28 DIAGNOSIS — N39.0 RECURRENT UTI: ICD-10-CM

## 2022-10-28 PROCEDURE — 76770 US EXAM ABDO BACK WALL COMP: CPT

## 2022-11-16 ENCOUNTER — TELEPHONE (OUTPATIENT)
Dept: PRIMARY CARE CLINIC | Facility: CLINIC | Age: 62
End: 2022-11-16

## 2022-11-21 NOTE — TELEPHONE ENCOUNTER
Request Reference Number: [id number]. JANUVIA TAB 100MG is approved through 11/16/2023. Your patient may now fill this prescription and it will be covered.

## 2022-12-28 DIAGNOSIS — E11.9 CONTROLLED TYPE 2 DIABETES MELLITUS WITHOUT COMPLICATION, WITHOUT LONG-TERM CURRENT USE OF INSULIN (HCC): ICD-10-CM

## 2022-12-28 DIAGNOSIS — I10 ESSENTIAL HYPERTENSION: ICD-10-CM

## 2022-12-28 DIAGNOSIS — E78.2 MIXED HYPERLIPIDEMIA: ICD-10-CM

## 2022-12-28 RX ORDER — EZETIMIBE 10 MG/1
TABLET ORAL
Qty: 30 TABLET | Refills: 0 | Status: SHIPPED | OUTPATIENT
Start: 2022-12-28

## 2022-12-28 RX ORDER — SITAGLIPTIN 100 MG/1
TABLET, FILM COATED ORAL
Qty: 30 TABLET | Refills: 0 | Status: SHIPPED | OUTPATIENT
Start: 2022-12-28

## 2022-12-28 RX ORDER — METOPROLOL SUCCINATE 50 MG/1
TABLET, EXTENDED RELEASE ORAL
Qty: 30 TABLET | Refills: 0 | Status: SHIPPED | OUTPATIENT
Start: 2022-12-28

## 2023-01-03 ENCOUNTER — OFFICE VISIT (OUTPATIENT)
Dept: UROLOGY | Age: 63
End: 2023-01-03
Payer: COMMERCIAL

## 2023-01-03 DIAGNOSIS — R31.29 MICROHEMATURIA: ICD-10-CM

## 2023-01-03 DIAGNOSIS — N28.1 RENAL CYST: ICD-10-CM

## 2023-01-03 DIAGNOSIS — N39.0 RECURRENT UTI: Primary | ICD-10-CM

## 2023-01-03 LAB
BILIRUBIN, URINE, POC: NEGATIVE
BLOOD URINE, POC: ABNORMAL
GLUCOSE URINE, POC: 500
KETONES, URINE, POC: NEGATIVE
LEUKOCYTE ESTERASE, URINE, POC: NEGATIVE
NITRITE, URINE, POC: POSITIVE
PH, URINE, POC: 5.5 (ref 4.6–8)
PROTEIN,URINE, POC: ABNORMAL
SPECIFIC GRAVITY, URINE, POC: 1.03 (ref 1–1.03)
URINALYSIS CLARITY, POC: ABNORMAL
URINALYSIS COLOR, POC: ABNORMAL
UROBILINOGEN, POC: ABNORMAL

## 2023-01-03 PROCEDURE — 81003 URINALYSIS AUTO W/O SCOPE: CPT | Performed by: NURSE PRACTITIONER

## 2023-01-03 PROCEDURE — 99214 OFFICE O/P EST MOD 30 MIN: CPT | Performed by: NURSE PRACTITIONER

## 2023-01-03 NOTE — PROGRESS NOTES
[facility]  [address] [facility] [address], [address] [facility]  [phone]          Azucena Gibson  : 1960    Chief Complaint   Patient presents with    Follow-up     Recurrent uti           HPI     Azucena Gibson is a 65 y.o. female w hx of DM2 and breast cancer being seen today for recurrent UTI. Upon chart review there are 2 positive E. Coli urine cultures in Aug and 2022. She reports UTI for many yrs. Sx include UU and UF. She saw a urologist in [facility]. Reports she had a cysto approx 4-5 yrs ago that showed \"inflammation\". Has tried D-mannose and estrogen cream wo success. Has diarrhea w metformin. Often gets yeast infections w antibiotics. Denies urine leakage or pad use. Urine culture 10/20/22 revealed klebsiella. Tx w keflex and keflex suppression started. Today she notes sx improvement. Int UF. MARIAA Oct 2022 revealed a. indeterminate left renal cyst.      No family hx of  Ca. Former smoker. Cr 0.78. History reviewed. No pertinent past medical history.   Past Surgical History:   Procedure Laterality Date    BREAST LUMPECTOMY Right 2009    BREAST RECONSTRUCTION  2020    BSHSI PB GASTRIC BYPASS SP  2017    Lap band    CERVICAL LAMINECTOMY  2016    C2 to C7     LUMBAR DISCECTOMY  2008    L4/L5    MASTECTOMY, BILATERAL      OTHER SURGICAL HISTORY Bilateral 2008    bilateral tear ducts -  and       Current Outpatient Medications   Medication Sig Dispense Refill    ezetimibe (ZETIA) 10 MG tablet Take 1 tablet by mouth once daily 30 tablet 0    metoprolol succinate (TOPROL XL) 50 MG extended release tablet TAKE 1 TABLET BY MOUTH ONCE DAILY FOR HIGH BLOOD PRESSURE 30 tablet 0    metFORMIN (GLUCOPHAGE) 1000 MG tablet TAKE 1 TABLET BY MOUTH TWICE DAILY WITH MEALS 60 tablet 0    JANUVIA 100 MG tablet TAKE 1 TABLET BY MOUTH IN THE MORNING 30 tablet 0    cephALEXin (KEFLEX) 250 MG capsule Take 1 capsule by mouth daily 90 capsule 0 Cholecalciferol (VITAMIN D) 125 MCG (5000 UT) CAPS Take 1 caplet by mouth daily 30 capsule 5    traMADol (ULTRAM) 50 MG tablet TAKE 1 TABLET BY MOUTH EVERY 6 HOURS AS NEEDED FOR PAIN FOR UP TO 30 DAYS (SHOULDER PAIN)      fluconazole (DIFLUCAN) 150 MG tablet Take 1 tab after macrobid is complete. Repeat in 3 days (Patient not taking: Reported on 1/3/2023) 2 tablet 0    temazepam (RESTORIL) 15 MG capsule TAKE 1 CAPSULE BY MOUTH NIGHTLY AS NEEDED FOR SLEEP 30 capsule 3    diazePAM (VALIUM) 10 MG tablet Take 10 mg by mouth.      meloxicam (MOBIC) 15 MG tablet Take 1 tablet by mouth daily 30 tablet 0    docusate (COLACE, DULCOLAX) 100 MG CAPS Take 100 mg by mouth 2 times daily       No current facility-administered medications for this visit.      Allergies   Allergen Reactions    Lisinopril Other (See Comments)     cough      Macrobid [Nitrofurantoin] Other (See Comments)     Macrobid not effective to treat UTI     Social History     Socioeconomic History    Marital status: Single     Spouse name: Not on file    Number of children: Not on file    Years of education: Not on file    Highest education level: Not on file   Occupational History    Not on file   Tobacco Use    Smoking status: Former     Types: Cigarettes     Quit date: 2017     Years since quittin.0    Smokeless tobacco: Never   Substance and Sexual Activity    Alcohol use: Not Currently    Drug use: Never    Sexual activity: Not on file   Other Topics Concern    Not on file   Social History Narrative    Not on file     Social Determinants of Health     Financial Resource Strain: Unknown    Difficulty of Paying Living Expenses: Patient refused   Food Insecurity: Unknown    Worried About Running Out of Food in the Last Year: Patient refused    Ran Out of Food in the Last Year: Patient refused   Transportation Needs: Not on file   Physical Activity: Not on file   Stress: Not on file   Social Connections: Not on file   Intimate Partner Violence: Not on file   Housing Stability: Not on file     History reviewed. No pertinent family history. Review of Systems  Constitutional: Negative  GI: Neg GI ROS  Genitourinary: Genitourinary negative    Urinalysis  UA - Dipstick  Results for orders placed or performed in visit on 01/03/23   AMB POC URINALYSIS DIP STICK AUTO W/O MICRO   Result Value Ref Range    Color (UA POC)      Clarity (UA POC)      Glucose, Urine,  Negative    Bilirubin, Urine, POC Negative Negative    KETONES, Urine, POC Negative Negative    Specific Gravity, Urine, POC 1.030 1.001 - 1.035    Blood (UA POC) Small Negative    pH, Urine, POC 5.5 4.6 - 8.0    Protein, Urine, POC Trace Negative    Urobilinogen, POC 2 mg/dL     Nitrite, Urine, POC Positive Negative    Leukocyte Esterase, Urine, POC Negative Negative   Results for orders placed or performed in visit on 09/16/22   AMB POC URINALYSIS DIP STICK AUTO W/O MICRO   Result Value Ref Range    Color, Urine, POC dark yellow     Clarity, Urine, POC clear     Glucose, Urine,  Negative    Bilirubin, Urine, POC Negative Negative    Ketones, Urine, POC Negative Negative    Specific Gravity, Urine, POC 1.030 1.001 - 1.035    Blood, Urine, POC trace Negative    pH, Urine, POC 5.5 4.6 - 8.0    Protein, Urine, POC 2+ Negative    Urobilinogen, POC 0.2     Nitrate, Urine, POC positive Negative    Leukocyte Esterase, Urine, POC 1+ Negative   RBC 2-3    PHYSICAL EXAM    General appearance - well appearing and in no distress  Mental status - alert, oriented to person, place, and time  Neck - supple, no significant adenopathy  Chest/Lung-  Quiet, even and easy respiratory effort without use of accessory muscles  Skin - normal coloration and turgor, no rashes        Assessment and Plan    ICD-10-CM    1. Recurrent UTI  N39.0 AMB POC URINALYSIS DIP STICK AUTO W/O MICRO     Culture, Urine      2. Renal cyst  N28.1 US RETROPERITONEAL COMPLETE      3. Microhematuria  R31.29       UA appears infected today. Will send for culture. If positive, will tx, continue suppression, and proceed w cysto for further eval. Procedure explained. She will continue keflex for now. Microhematuria likely from UTI. Will follow. Repeat MARIAA for stability in April. Fu pending. To call sooner if needed. Kye Parham is supervising physician today and he approves plan of care.

## 2023-01-06 LAB
BACTERIA SPEC CULT: ABNORMAL
SERVICE CMNT-IMP: ABNORMAL

## 2023-01-06 RX ORDER — SULFAMETHOXAZOLE AND TRIMETHOPRIM 800; 160 MG/1; MG/1
1 TABLET ORAL 2 TIMES DAILY
Qty: 14 TABLET | Refills: 0 | Status: SHIPPED | OUTPATIENT
Start: 2023-01-06 | End: 2023-01-13

## 2023-01-24 ENCOUNTER — TELEMEDICINE (OUTPATIENT)
Dept: PRIMARY CARE CLINIC | Facility: CLINIC | Age: 63
End: 2023-01-24
Payer: COMMERCIAL

## 2023-01-24 DIAGNOSIS — M25.511 CHRONIC RIGHT SHOULDER PAIN: ICD-10-CM

## 2023-01-24 DIAGNOSIS — E11.9 CONTROLLED TYPE 2 DIABETES MELLITUS WITHOUT COMPLICATION, WITHOUT LONG-TERM CURRENT USE OF INSULIN (HCC): Primary | ICD-10-CM

## 2023-01-24 DIAGNOSIS — G89.29 CHRONIC RIGHT SHOULDER PAIN: ICD-10-CM

## 2023-01-24 DIAGNOSIS — E78.2 MIXED HYPERLIPIDEMIA: ICD-10-CM

## 2023-01-24 DIAGNOSIS — F51.01 PRIMARY INSOMNIA: ICD-10-CM

## 2023-01-24 DIAGNOSIS — I10 ESSENTIAL HYPERTENSION: ICD-10-CM

## 2023-01-24 DIAGNOSIS — N39.0 RECURRENT UTI: ICD-10-CM

## 2023-01-24 DIAGNOSIS — Z79.899 ENCOUNTER FOR LONG-TERM (CURRENT) USE OF MEDICATIONS: ICD-10-CM

## 2023-01-24 DIAGNOSIS — Z85.3 HISTORY OF BREAST CANCER: ICD-10-CM

## 2023-01-24 DIAGNOSIS — E55.9 VITAMIN D DEFICIENCY: ICD-10-CM

## 2023-01-24 PROCEDURE — 99214 OFFICE O/P EST MOD 30 MIN: CPT | Performed by: FAMILY MEDICINE

## 2023-01-24 RX ORDER — TEMAZEPAM 15 MG/1
CAPSULE ORAL
Qty: 30 CAPSULE | Refills: 3 | Status: SHIPPED | OUTPATIENT
Start: 2023-01-24 | End: 2023-02-24

## 2023-01-24 RX ORDER — METOPROLOL SUCCINATE 50 MG/1
50 TABLET, EXTENDED RELEASE ORAL DAILY
Qty: 90 TABLET | Refills: 3 | Status: SHIPPED | OUTPATIENT
Start: 2023-01-24

## 2023-01-24 RX ORDER — EZETIMIBE 10 MG/1
10 TABLET ORAL DAILY
Qty: 90 TABLET | Refills: 3 | Status: SHIPPED | OUTPATIENT
Start: 2023-01-24

## 2023-01-24 ASSESSMENT — PATIENT HEALTH QUESTIONNAIRE - PHQ9
SUM OF ALL RESPONSES TO PHQ QUESTIONS 1-9: 0
2. FEELING DOWN, DEPRESSED OR HOPELESS: 0
SUM OF ALL RESPONSES TO PHQ QUESTIONS 1-9: 0
1. LITTLE INTEREST OR PLEASURE IN DOING THINGS: 0
SUM OF ALL RESPONSES TO PHQ QUESTIONS 1-9: 0
SUM OF ALL RESPONSES TO PHQ9 QUESTIONS 1 & 2: 0
SUM OF ALL RESPONSES TO PHQ QUESTIONS 1-9: 0

## 2023-01-24 NOTE — PROGRESS NOTES
Alyssa Borrero M.D.  [address] [facility]  Nubia Monae  Phone:  [phone]  Fax:  [phone]          I was in the office while conducting this encounter. The patient was at home. CHIEF COMPLAINT:  Chief Complaint   Patient presents with    Cholesterol Problem     She takes Zetia 10 mg daily for cholesterol. She is doing well with the medication. Diabetes     She takes Januvia 100 mg daily and metformin 1000 mg twice daily. Her last A1c was 10.2. Hypertension     Her blood pressures been under control. She takes metoprolol XL 50 mg daily. Insomnia     She takes temazepam 15 mg at night for insomnia. The medication is helping. Shoulder Pain     She continues have intermittent shoulder pain. She takes tramadol as needed. Colon Cancer Screening     Patient states that she does not want to do any colon cancer screening at this time. She does have a history of colon polyps. HISTORY OF PRESENT ILLNESS:  Ms. Mouna Byrne is a 65 y.o. female  who presents for follow up. HISTORY:  No Active Allergies        REVIEW OF SYSTEMS:  Review of systems is as indicated in HPI otherwise negative. PHYSICAL EXAM:    Vital Signs: (As obtained by patient/caregiver at home)  Patient-Reported Vitals 1/24/2023   Patient-Reported Weight 168   Patient-Reported Height 54   Patient-Reported Pulse 82   Patient-Reported Temperature 97.2   Patient-Reported SpO2 98           PHQ:  PHQ-9  1/24/2023   Little interest or pleasure in doing things 0   Little interest or pleasure in doing things -   Feeling down, depressed, or hopeless 0   PHQ-2 Score 0   Total Score PHQ 2 -   PHQ-9 Total Score 0       LABS  No results found for this visit on 01/24/23.   Office Visit on 01/03/2023   Component Date Value Ref Range Status    Glucose, Urine, POC 01/03/2023 500  Negative Final    Bilirubin, Urine, POC 01/03/2023 Negative  Negative Final    KETONES, Urine, POC 01/03/2023 Negative  Negative Final    Specific Gravity, Urine, POC 01/03/2023 1.030  1.001 - 1.035 Final    Blood (UA POC) 01/03/2023 Small  Negative Final    pH, Urine, POC 01/03/2023 5.5  4.6 - 8.0 Final    Protein, Urine, POC 01/03/2023 Trace  Negative Final    Urobilinogen, POC 01/03/2023 2 mg/dL   Final    Nitrite, Urine, POC 01/03/2023 Positive  Negative Final    Leukocyte Esterase, Urine, POC 01/03/2023 Negative  Negative Final    Special Requests 01/03/2023 NO SPECIAL REQUESTS    Final    Culture 01/03/2023 >100,000 COLONIES/mL KLEBSIELLA PNEUMONIAE (A)    Final       IMPRESSION/PLAN     Diagnosis Orders   1. Controlled type 2 diabetes mellitus without complication, without long-term current use of insulin (HCC)  metFORMIN (GLUCOPHAGE) 1000 MG tablet    SITagliptin (JANUVIA) 100 MG tablet    CBC with Auto Differential    Comprehensive Metabolic Panel    Hemoglobin A1C      2. Essential hypertension  metoprolol succinate (TOPROL XL) 50 MG extended release tablet    CBC with Auto Differential    Comprehensive Metabolic Panel      3. Mixed hyperlipidemia  ezetimibe (ZETIA) 10 MG tablet    Lipid Panel      4. Primary insomnia  temazepam (RESTORIL) 15 MG capsule      5. Chronic right shoulder pain      takes Tramadol prn      6. Vitamin D deficiency  Vitamin D 25 Hydroxy      7. History of breast cancer        8. Encounter for long-term (current) use of medications  CBC with Auto Differential    Comprehensive Metabolic Panel          Follow up and Dispositions:  Return in about 6 months (around 7/24/2023) for [address] [place] [address]. Patient is stable on medications and will continue current medications. Refilled the above medications. Will add the following medications: Will change the following medications:  Reviewed medications and side effects in detail. Was given samples of   Will check the above labs. Reviewed most recent labs. Reviewed diet, exercise and weight control.   Cardiovascular risks and recommendations reviewed. Patient encouraged to quit smoking. Patient encouraged to take medications as prescribed. Patient encouraged to follow a diabetic/low sodium diet. Use of aspirin to prevent MIs and TIAs discussed. Patient will check blood sugars once daily. Consent: This patient and/or their healthcare decision maker is aware that this patient-initiated Telehealth encounter is a billable service, with coverage as determined by their insurance carrier. Patient is aware that they may receive a bill and has provided verbal consent to proceed: Yes     The patient is being evaluated by a Virtual Visit (video visit) encounter to address concerns as mentioned above. A caregiver was present when appropriate. Due to this being a TeleHealth encounter (During IWISJ-78 public health emergency), evaluation of the following organ systems was limited: Vitals/Constitutional/EENT/Resp/CV/GI//MS/Neuro/Skin/Heme-Lymph-Imm. Pursuant to the emergency declaration under the [address] [place] [address], [address] [place] [address] and the [email or web address] and [employer] Act, this Virtual Visit was conducted with patient's (and/or legal guardian's) consent, to reduce the patient's risk of exposure to COVID-19 and provide necessary medical care. The patient (and/or legal guardian) has also been advised to contact this office for worsening conditions or problems, and seek emergency medical treatment and/or call 911 if deemed necessary. Patient identification was verified at the start of the visit: YES    Services were provided through a video synchronous discussion virtually to substitute for in-person clinic visit. Patient and provider were located at their individual homes. Senia Jacques, was evaluated through a synchronous (real-time) audio-video encounter. The patient (or guardian if applicable) is aware that this is a billable service, which includes applicable co-pays. This Virtual Visit was conducted with patient's (and/or legal guardian's) consent. The visit was conducted pursuant to the emergency declaration under the [address] [facility], [address] [place] [address] authority and the [employer] and [employer] Act. Patient identification was verified, and a caregiver was present when appropriate. The patient was located at Home: [address] [place] [address]. [address] [place] [address]. Provider was located at [facility] ([address] [place] [address]): [address].. Total Time: minutes: 11-20 minutes. Dictated using voice recognition software.  Proofread, but unrecognized voice recognition errors may exist.    Soy Castellano MD  01/24/23

## 2023-01-26 PROBLEM — C50.919 BREAST CA (HCC): Status: RESOLVED | Noted: 2022-06-14 | Resolved: 2023-01-26

## 2023-01-31 DIAGNOSIS — N39.0 RECURRENT UTI: Primary | ICD-10-CM

## 2023-01-31 RX ORDER — CEPHALEXIN 250 MG/1
250 CAPSULE ORAL DAILY
Qty: 90 CAPSULE | Refills: 0 | Status: SHIPPED | OUTPATIENT
Start: 2023-01-31

## 2023-02-02 RX ORDER — CEPHALEXIN 250 MG/1
CAPSULE ORAL
Qty: 90 CAPSULE | Refills: 0 | OUTPATIENT
Start: 2023-02-02

## 2023-02-15 DIAGNOSIS — I10 ESSENTIAL HYPERTENSION: ICD-10-CM

## 2023-02-15 DIAGNOSIS — E78.2 MIXED HYPERLIPIDEMIA: ICD-10-CM

## 2023-02-15 DIAGNOSIS — E55.9 VITAMIN D DEFICIENCY: ICD-10-CM

## 2023-02-15 DIAGNOSIS — Z79.899 ENCOUNTER FOR LONG-TERM (CURRENT) USE OF MEDICATIONS: ICD-10-CM

## 2023-02-15 DIAGNOSIS — E11.9 CONTROLLED TYPE 2 DIABETES MELLITUS WITHOUT COMPLICATION, WITHOUT LONG-TERM CURRENT USE OF INSULIN (HCC): ICD-10-CM

## 2023-02-15 LAB
25(OH)D3 SERPL-MCNC: 73.2 NG/ML (ref 30–100)
ALBUMIN SERPL-MCNC: 3.9 G/DL (ref 3.2–4.6)
ALBUMIN/GLOB SERPL: 1 (ref 0.4–1.6)
ALP SERPL-CCNC: 86 U/L (ref 50–136)
ALT SERPL-CCNC: 71 U/L (ref 12–65)
ANION GAP SERPL CALC-SCNC: 8 MMOL/L (ref 2–11)
AST SERPL-CCNC: 47 U/L (ref 15–37)
BASOPHILS # BLD: 0.1 K/UL (ref 0–0.2)
BASOPHILS NFR BLD: 1 % (ref 0–2)
BILIRUB SERPL-MCNC: 0.7 MG/DL (ref 0.2–1.1)
BUN SERPL-MCNC: 10 MG/DL (ref 8–23)
CALCIUM SERPL-MCNC: 9.5 MG/DL (ref 8.3–10.4)
CHLORIDE SERPL-SCNC: 104 MMOL/L (ref 101–110)
CHOLEST SERPL-MCNC: 157 MG/DL
CO2 SERPL-SCNC: 27 MMOL/L (ref 21–32)
CREAT SERPL-MCNC: 0.7 MG/DL (ref 0.6–1)
DIFFERENTIAL METHOD BLD: NORMAL
EOSINOPHIL # BLD: 0.1 K/UL (ref 0–0.8)
EOSINOPHIL NFR BLD: 1 % (ref 0.5–7.8)
ERYTHROCYTE [DISTWIDTH] IN BLOOD BY AUTOMATED COUNT: 12.1 % (ref 11.9–14.6)
EST. AVERAGE GLUCOSE BLD GHB EST-MCNC: 212 MG/DL
GLOBULIN SER CALC-MCNC: 4.1 G/DL (ref 2.8–4.5)
GLUCOSE SERPL-MCNC: 206 MG/DL (ref 65–100)
HBA1C MFR BLD: 9 % (ref 4.8–5.6)
HCT VFR BLD AUTO: 44.8 % (ref 35.8–46.3)
HDLC SERPL-MCNC: 41 MG/DL (ref 40–60)
HDLC SERPL: 3.8
HGB BLD-MCNC: 14.9 G/DL (ref 11.7–15.4)
IMM GRANULOCYTES # BLD AUTO: 0 K/UL (ref 0–0.5)
IMM GRANULOCYTES NFR BLD AUTO: 0 % (ref 0–5)
LDLC SERPL CALC-MCNC: 72.8 MG/DL
LYMPHOCYTES # BLD: 2.9 K/UL (ref 0.5–4.6)
LYMPHOCYTES NFR BLD: 42 % (ref 13–44)
MCH RBC QN AUTO: 32.7 PG (ref 26.1–32.9)
MCHC RBC AUTO-ENTMCNC: 33.3 G/DL (ref 31.4–35)
MCV RBC AUTO: 98.5 FL (ref 82–102)
MONOCYTES # BLD: 0.5 K/UL (ref 0.1–1.3)
MONOCYTES NFR BLD: 7 % (ref 4–12)
NEUTS SEG # BLD: 3.5 K/UL (ref 1.7–8.2)
NEUTS SEG NFR BLD: 49 % (ref 43–78)
NRBC # BLD: 0 K/UL (ref 0–0.2)
PLATELET # BLD AUTO: 186 K/UL (ref 150–450)
PMV BLD AUTO: 10.4 FL (ref 9.4–12.3)
POTASSIUM SERPL-SCNC: 4.1 MMOL/L (ref 3.5–5.1)
PROT SERPL-MCNC: 8 G/DL (ref 6.3–8.2)
RBC # BLD AUTO: 4.55 M/UL (ref 4.05–5.2)
SODIUM SERPL-SCNC: 139 MMOL/L (ref 133–143)
TRIGL SERPL-MCNC: 216 MG/DL (ref 35–150)
VLDLC SERPL CALC-MCNC: 43.2 MG/DL (ref 6–23)
WBC # BLD AUTO: 7 K/UL (ref 4.3–11.1)

## 2023-03-14 ENCOUNTER — OFFICE VISIT (OUTPATIENT)
Dept: UROLOGY | Age: 63
End: 2023-03-14
Payer: COMMERCIAL

## 2023-03-14 ENCOUNTER — TELEPHONE (OUTPATIENT)
Dept: UROLOGY | Age: 63
End: 2023-03-14

## 2023-03-14 DIAGNOSIS — R39.89 URINARY PROBLEM: Primary | ICD-10-CM

## 2023-03-14 DIAGNOSIS — N39.0 RECURRENT UTI: ICD-10-CM

## 2023-03-14 LAB
BILIRUBIN, URINE, POC: NEGATIVE
BLOOD URINE, POC: ABNORMAL
GLUCOSE URINE, POC: 1000
KETONES, URINE, POC: NEGATIVE
LEUKOCYTE ESTERASE, URINE, POC: NEGATIVE
NITRITE, URINE, POC: NEGATIVE
PH, URINE, POC: 5.5 (ref 4.6–8)
PROTEIN,URINE, POC: ABNORMAL
SPECIFIC GRAVITY, URINE, POC: 1.03 (ref 1–1.03)
URINALYSIS CLARITY, POC: ABNORMAL
URINALYSIS COLOR, POC: ABNORMAL
UROBILINOGEN, POC: NORMAL

## 2023-03-14 PROCEDURE — 81003 URINALYSIS AUTO W/O SCOPE: CPT | Performed by: NURSE PRACTITIONER

## 2023-03-14 NOTE — PROGRESS NOTES
Results for orders placed or performed in visit on 03/14/23   AMB POC URINALYSIS DIP STICK AUTO W/O MICRO   Result Value Ref Range    Color, Urine, POC      Clarity, Urine, POC      Glucose, Urine, POC 1000 Negative    Bilirubin, Urine, POC Negative Negative    Ketones, Urine, POC Negative Negative    Specific Gravity, Urine, POC 1.030 1.001 - 1.035    Blood, Urine, POC Moderate Negative    pH, Urine, POC 5.5 4.6 - 8.0    Protein, Urine, POC Trace Negative    Urobilinogen, POC Normal     Nitrite, Urine, POC Negative Negative    Leukocyte Esterase, Urine, POC Negative Negative     UA shows blood. No infection seen. Will send for culture to ensure no infection. Continue daily keflex for now. Needs further eval w cystoscopy like prev discussed.    Justice Flannery, APRN - CNP

## 2023-03-18 LAB
BACTERIA SPEC CULT: ABNORMAL
SERVICE CMNT-IMP: ABNORMAL

## 2023-04-05 ENCOUNTER — PROCEDURE VISIT (OUTPATIENT)
Dept: UROLOGY | Age: 63
End: 2023-04-05
Payer: COMMERCIAL

## 2023-04-05 DIAGNOSIS — N39.0 RECURRENT UTI: Primary | ICD-10-CM

## 2023-04-05 LAB
BILIRUBIN, URINE, POC: NEGATIVE
BLOOD URINE, POC: NORMAL
GLUCOSE URINE, POC: 500
KETONES, URINE, POC: NEGATIVE
LEUKOCYTE ESTERASE, URINE, POC: NORMAL
NITRITE, URINE, POC: POSITIVE
PH, URINE, POC: 5 (ref 4.6–8)
PROTEIN,URINE, POC: NEGATIVE
SPECIFIC GRAVITY, URINE, POC: 1.02 (ref 1–1.03)
URINALYSIS CLARITY, POC: NORMAL
URINALYSIS COLOR, POC: NORMAL
UROBILINOGEN, POC: NORMAL

## 2023-04-05 PROCEDURE — 81003 URINALYSIS AUTO W/O SCOPE: CPT | Performed by: UROLOGY

## 2023-04-05 RX ORDER — SULFAMETHOXAZOLE AND TRIMETHOPRIM 800; 160 MG/1; MG/1
1 TABLET ORAL 2 TIMES DAILY
Qty: 14 TABLET | Refills: 0 | Status: SHIPPED | OUTPATIENT
Start: 2023-04-05 | End: 2023-04-12

## 2023-04-05 NOTE — PROGRESS NOTES
[address] [facility] [address], [address] [facility]  Phone: [phone]  Fax: [phone]

## 2023-04-07 LAB
BACTERIA SPEC CULT: ABNORMAL
SERVICE CMNT-IMP: ABNORMAL

## 2023-04-07 NOTE — RESULT ENCOUNTER NOTE
Mrs. Ebony Martinez, your urine culture did show a bladder infection. You are on the correct antibiotic, bactrim. Please plan to keep your follow up with me next week for cystoscopy while on the antibiotic to minimize UTI risk.      Dr. Ian Wong

## 2023-04-24 DIAGNOSIS — N39.0 RECURRENT UTI: ICD-10-CM

## 2023-05-02 RX ORDER — CEPHALEXIN 250 MG/1
CAPSULE ORAL
Qty: 90 CAPSULE | Refills: 0 | Status: SHIPPED | OUTPATIENT
Start: 2023-05-02

## 2023-05-03 ENCOUNTER — HOSPITAL ENCOUNTER (OUTPATIENT)
Dept: CT IMAGING | Age: 63
Discharge: HOME | End: 2023-05-06
Payer: COMMERCIAL

## 2023-05-03 DIAGNOSIS — R31.29 MICROSCOPIC HEMATURIA: ICD-10-CM

## 2023-05-03 LAB — CREAT BLD-MCNC: 0.64 MG/DL (ref 0.8–1.5)

## 2023-05-03 PROCEDURE — 82565 ASSAY OF CREATININE: CPT

## 2023-05-03 PROCEDURE — 74178 CT ABD&PLV WO CNTR FLWD CNTR: CPT

## 2023-05-03 PROCEDURE — 6360000004 HC RX CONTRAST MEDICATION: Performed by: UROLOGY

## 2023-05-03 RX ADMIN — IOPAMIDOL 100 ML: 755 INJECTION, SOLUTION INTRAVENOUS at 09:56

## 2023-06-23 ENCOUNTER — OFFICE VISIT (OUTPATIENT)
Dept: UROLOGY | Age: 63
End: 2023-06-23

## 2023-06-23 ENCOUNTER — TELEPHONE (OUTPATIENT)
Dept: UROLOGY | Age: 63
End: 2023-06-23

## 2023-06-23 DIAGNOSIS — N39.0 RECURRENT UTI: Primary | ICD-10-CM

## 2023-06-23 LAB
BILIRUBIN, URINE, POC: NEGATIVE
BLOOD URINE, POC: NEGATIVE
GLUCOSE URINE, POC: 1000
KETONES, URINE, POC: NEGATIVE
LEUKOCYTE ESTERASE, URINE, POC: NEGATIVE
NITRITE, URINE, POC: NEGATIVE
PH, URINE, POC: 5 (ref 4.6–8)
PROTEIN,URINE, POC: NEGATIVE
SPECIFIC GRAVITY, URINE, POC: 1.03 (ref 1–1.03)
URINALYSIS CLARITY, POC: NORMAL
URINALYSIS COLOR, POC: NORMAL
UROBILINOGEN, POC: NORMAL

## 2023-06-23 NOTE — PROGRESS NOTES
Results for orders placed or performed in visit on 06/23/23   AMB POC URINALYSIS DIP STICK AUTO W/O MICRO   Result Value Ref Range    Color (UA POC)      Clarity (UA POC)      Glucose, Urine, POC 1000 Negative    Bilirubin, Urine, POC Negative Negative    KETONES, Urine, POC Negative Negative    Specific Gravity, Urine, POC 1.030 1.001 - 1.035    Blood (UA POC) Negative Negative    pH, Urine, POC 5 4.6 - 8.0    Protein, Urine, POC Negative Negative    Urobilinogen, POC 0.2 mg/dL     Nitrite, Urine, POC Negative Negative    Leukocyte Esterase, Urine, POC Negative Negative     Urine clear.    Wilian Colmenares, APRN - CNP

## 2023-07-20 ASSESSMENT — PATIENT HEALTH QUESTIONNAIRE - PHQ9
4. FEELING TIRED OR HAVING LITTLE ENERGY: 2
2. FEELING DOWN, DEPRESSED OR HOPELESS: SEVERAL DAYS
8. MOVING OR SPEAKING SO SLOWLY THAT OTHER PEOPLE COULD HAVE NOTICED. OR THE OPPOSITE, BEING SO FIGETY OR RESTLESS THAT YOU HAVE BEEN MOVING AROUND A LOT MORE THAN USUAL: 0
7. TROUBLE CONCENTRATING ON THINGS, SUCH AS READING THE NEWSPAPER OR WATCHING TELEVISION: SEVERAL DAYS
SUM OF ALL RESPONSES TO PHQ QUESTIONS 1-9: 9
9. THOUGHTS THAT YOU WOULD BE BETTER OFF DEAD, OR OF HURTING YOURSELF: NOT AT ALL
3. TROUBLE FALLING OR STAYING ASLEEP: NEARLY EVERY DAY
1. LITTLE INTEREST OR PLEASURE IN DOING THINGS: 2
2. FEELING DOWN, DEPRESSED OR HOPELESS: 1
5. POOR APPETITE OR OVEREATING: NOT AT ALL
SUM OF ALL RESPONSES TO PHQ9 QUESTIONS 1 & 2: 3
SUM OF ALL RESPONSES TO PHQ9 QUESTIONS 1 & 2: 3
7. TROUBLE CONCENTRATING ON THINGS, SUCH AS READING THE NEWSPAPER OR WATCHING TELEVISION: 1
10. IF YOU CHECKED OFF ANY PROBLEMS, HOW DIFFICULT HAVE THESE PROBLEMS MADE IT FOR YOU TO DO YOUR WORK, TAKE CARE OF THINGS AT HOME, OR GET ALONG WITH OTHER PEOPLE: 1
6. FEELING BAD ABOUT YOURSELF - OR THAT YOU ARE A FAILURE OR HAVE LET YOURSELF OR YOUR FAMILY DOWN: NOT AT ALL
SUM OF ALL RESPONSES TO PHQ QUESTIONS 1-9: 9
5. POOR APPETITE OR OVEREATING: 0
3. TROUBLE FALLING OR STAYING ASLEEP: 3
10. IF YOU CHECKED OFF ANY PROBLEMS, HOW DIFFICULT HAVE THESE PROBLEMS MADE IT FOR YOU TO DO YOUR WORK, TAKE CARE OF THINGS AT HOME, OR GET ALONG WITH OTHER PEOPLE: SOMEWHAT DIFFICULT
8. MOVING OR SPEAKING SO SLOWLY THAT OTHER PEOPLE COULD HAVE NOTICED. OR THE OPPOSITE - BEING SO FIDGETY OR RESTLESS THAT YOU HAVE BEEN MOVING AROUND A LOT MORE THAN USUAL: NOT AT ALL
4. FEELING TIRED OR HAVING LITTLE ENERGY: MORE THAN HALF THE DAYS
1. LITTLE INTEREST OR PLEASURE IN DOING THINGS: MORE THAN HALF THE DAYS
SUM OF ALL RESPONSES TO PHQ QUESTIONS 1-9: 9
6. FEELING BAD ABOUT YOURSELF - OR THAT YOU ARE A FAILURE OR HAVE LET YOURSELF OR YOUR FAMILY DOWN: 0
9. THOUGHTS THAT YOU WOULD BE BETTER OFF DEAD, OR OF HURTING YOURSELF: 0

## 2023-07-22 SDOH — ECONOMIC STABILITY: HOUSING INSECURITY
IN THE LAST 12 MONTHS, WAS THERE A TIME WHEN YOU DID NOT HAVE A STEADY PLACE TO SLEEP OR SLEPT IN A SHELTER (INCLUDING NOW)?: NO

## 2023-07-22 SDOH — ECONOMIC STABILITY: INCOME INSECURITY: HOW HARD IS IT FOR YOU TO PAY FOR THE VERY BASICS LIKE FOOD, HOUSING, MEDICAL CARE, AND HEATING?: NOT HARD AT ALL

## 2023-07-22 SDOH — ECONOMIC STABILITY: TRANSPORTATION INSECURITY
IN THE PAST 12 MONTHS, HAS LACK OF TRANSPORTATION KEPT YOU FROM MEETINGS, WORK, OR FROM GETTING THINGS NEEDED FOR DAILY LIVING?: NO

## 2023-07-22 SDOH — ECONOMIC STABILITY: FOOD INSECURITY: WITHIN THE PAST 12 MONTHS, THE FOOD YOU BOUGHT JUST DIDN'T LAST AND YOU DIDN'T HAVE MONEY TO GET MORE.: NEVER TRUE

## 2023-07-22 SDOH — ECONOMIC STABILITY: FOOD INSECURITY: WITHIN THE PAST 12 MONTHS, YOU WORRIED THAT YOUR FOOD WOULD RUN OUT BEFORE YOU GOT MONEY TO BUY MORE.: NEVER TRUE

## 2023-07-24 ENCOUNTER — OFFICE VISIT (OUTPATIENT)
Dept: PRIMARY CARE CLINIC | Facility: CLINIC | Age: 63
End: 2023-07-24
Payer: COMMERCIAL

## 2023-07-24 VITALS
WEIGHT: 166 LBS | BODY MASS INDEX: 28.34 KG/M2 | HEIGHT: 64 IN | SYSTOLIC BLOOD PRESSURE: 137 MMHG | HEART RATE: 74 BPM | DIASTOLIC BLOOD PRESSURE: 81 MMHG | OXYGEN SATURATION: 98 % | TEMPERATURE: 97.3 F

## 2023-07-24 DIAGNOSIS — E78.2 MIXED HYPERLIPIDEMIA: ICD-10-CM

## 2023-07-24 DIAGNOSIS — Z85.3 HISTORY OF BREAST CANCER: ICD-10-CM

## 2023-07-24 DIAGNOSIS — F51.01 PRIMARY INSOMNIA: ICD-10-CM

## 2023-07-24 DIAGNOSIS — I10 ESSENTIAL HYPERTENSION: ICD-10-CM

## 2023-07-24 DIAGNOSIS — E55.9 VITAMIN D DEFICIENCY: ICD-10-CM

## 2023-07-24 DIAGNOSIS — E11.65 TYPE 2 DIABETES MELLITUS WITH HYPERGLYCEMIA, WITHOUT LONG-TERM CURRENT USE OF INSULIN (HCC): Primary | ICD-10-CM

## 2023-07-24 DIAGNOSIS — Z79.899 ENCOUNTER FOR LONG-TERM (CURRENT) USE OF MEDICATIONS: ICD-10-CM

## 2023-07-24 PROCEDURE — 3078F DIAST BP <80 MM HG: CPT | Performed by: FAMILY MEDICINE

## 2023-07-24 PROCEDURE — 99214 OFFICE O/P EST MOD 30 MIN: CPT | Performed by: FAMILY MEDICINE

## 2023-07-24 PROCEDURE — 3074F SYST BP LT 130 MM HG: CPT | Performed by: FAMILY MEDICINE

## 2023-07-24 PROCEDURE — 3052F HG A1C>EQUAL 8.0%<EQUAL 9.0%: CPT | Performed by: FAMILY MEDICINE

## 2023-07-24 RX ORDER — DOXEPIN HYDROCHLORIDE 10 MG/1
10 CAPSULE ORAL NIGHTLY
Qty: 30 CAPSULE | Refills: 5 | Status: SHIPPED | OUTPATIENT
Start: 2023-07-24

## 2023-07-24 RX ORDER — ACYCLOVIR 400 MG/1
TABLET ORAL
Qty: 3 EACH | Refills: 11 | Status: SHIPPED | OUTPATIENT
Start: 2023-07-24

## 2023-07-24 RX ORDER — LIRAGLUTIDE 6 MG/ML
1.2 INJECTION SUBCUTANEOUS DAILY
Qty: 3 ML | Refills: 5 | Status: SHIPPED | OUTPATIENT
Start: 2023-07-24

## 2023-07-24 RX ORDER — D-MANNOSE 99 %
POWDER (GRAM) MISCELLANEOUS
COMMUNITY
Start: 2023-04-01

## 2023-07-25 DIAGNOSIS — N39.0 RECURRENT UTI: ICD-10-CM

## 2023-07-26 RX ORDER — CEPHALEXIN 250 MG/1
CAPSULE ORAL
Qty: 90 CAPSULE | Refills: 0 | Status: SHIPPED | OUTPATIENT
Start: 2023-07-26

## 2023-08-15 ENCOUNTER — COMMUNITY OUTREACH (OUTPATIENT)
Dept: PRIMARY CARE CLINIC | Facility: CLINIC | Age: 63
End: 2023-08-15

## 2023-08-24 ENCOUNTER — PATIENT MESSAGE (OUTPATIENT)
Dept: PRIMARY CARE CLINIC | Facility: CLINIC | Age: 63
End: 2023-08-24

## 2023-08-27 RX ORDER — SEMAGLUTIDE 1.34 MG/ML
1 INJECTION, SOLUTION SUBCUTANEOUS
Qty: 3 ML | Refills: 5 | Status: SHIPPED | OUTPATIENT
Start: 2023-08-27

## 2023-09-14 ENCOUNTER — OFFICE VISIT (OUTPATIENT)
Dept: UROLOGY | Age: 63
End: 2023-09-14
Payer: COMMERCIAL

## 2023-09-14 DIAGNOSIS — N20.0 KIDNEY STONE: Primary | ICD-10-CM

## 2023-09-14 DIAGNOSIS — N39.0 RECURRENT UTI: ICD-10-CM

## 2023-09-14 LAB
BILIRUBIN, URINE, POC: NEGATIVE
BLOOD URINE, POC: NORMAL
GLUCOSE URINE, POC: NEGATIVE
KETONES, URINE, POC: NEGATIVE
LEUKOCYTE ESTERASE, URINE, POC: NEGATIVE
NITRITE, URINE, POC: NEGATIVE
PH, URINE, POC: 5.5 (ref 4.6–8)
PROTEIN,URINE, POC: 30
SPECIFIC GRAVITY, URINE, POC: 1.03 (ref 1–1.03)
URINALYSIS CLARITY, POC: NORMAL
URINALYSIS COLOR, POC: NORMAL
UROBILINOGEN, POC: NORMAL

## 2023-09-14 PROCEDURE — 99214 OFFICE O/P EST MOD 30 MIN: CPT | Performed by: NURSE PRACTITIONER

## 2023-09-14 PROCEDURE — 74018 RADEX ABDOMEN 1 VIEW: CPT | Performed by: NURSE PRACTITIONER

## 2023-09-14 PROCEDURE — 81003 URINALYSIS AUTO W/O SCOPE: CPT | Performed by: NURSE PRACTITIONER

## 2023-09-14 RX ORDER — TRAMADOL HYDROCHLORIDE 50 MG/1
50 TABLET ORAL EVERY 6 HOURS PRN
Qty: 20 TABLET | Refills: 0 | Status: ON HOLD | OUTPATIENT
Start: 2023-09-14 | End: 2023-09-19

## 2023-09-14 RX ORDER — TAMSULOSIN HYDROCHLORIDE 0.4 MG/1
0.4 CAPSULE ORAL DAILY
Qty: 30 CAPSULE | Refills: 1 | Status: ON HOLD | OUTPATIENT
Start: 2023-09-14

## 2023-09-14 ASSESSMENT — ENCOUNTER SYMPTOMS
ABDOMINAL PAIN: 1
BACK PAIN: 1

## 2023-09-14 NOTE — PROGRESS NOTES
Will begin flomax. Push fluids. Ultram PRN. CT if no improvement. Discussed weaning. She will begin keflex MWF for sev weeks then DC if asx. I have educated patient to behavioral changes that can decrease the frequency of any urinary infection. These include eliminating constipation, front to back wiping, frequent voiding to keep bladder volumes low, double voiding, avoid soaking in water (including baths, pools, hot tubs), avoiding tight fitting clothes, avoiding douching, use of cranberry products, and use of probiotics. I encouraged consuming minimum of 64 oz of water daily. I also recommend avoiding consumption of sugary beverages and other known bladder irritants. ROV in 2 weeks. To call sooner if needed. Tay Suarez Mai is supervising physician today and he approves plan of care.

## 2023-09-16 ENCOUNTER — APPOINTMENT (OUTPATIENT)
Dept: CT IMAGING | Age: 63
DRG: 854 | End: 2023-09-16
Payer: COMMERCIAL

## 2023-09-16 ENCOUNTER — HOSPITAL ENCOUNTER (INPATIENT)
Age: 63
LOS: 7 days | Discharge: HOME | DRG: 854 | End: 2023-09-24
Attending: STUDENT IN AN ORGANIZED HEALTH CARE EDUCATION/TRAINING PROGRAM | Admitting: FAMILY MEDICINE
Payer: COMMERCIAL

## 2023-09-16 ENCOUNTER — APPOINTMENT (OUTPATIENT)
Dept: GENERAL RADIOLOGY | Age: 63
DRG: 854 | End: 2023-09-16
Payer: COMMERCIAL

## 2023-09-16 DIAGNOSIS — N20.1 URETEROLITHIASIS: ICD-10-CM

## 2023-09-16 DIAGNOSIS — N17.9 SEPSIS WITH ACUTE RENAL FAILURE WITHOUT SEPTIC SHOCK, DUE TO UNSPECIFIED ORGANISM, UNSPECIFIED ACUTE RENAL FAILURE TYPE (HCC): Primary | ICD-10-CM

## 2023-09-16 DIAGNOSIS — A41.9 SEPSIS WITH ACUTE RENAL FAILURE WITHOUT SEPTIC SHOCK, DUE TO UNSPECIFIED ORGANISM, UNSPECIFIED ACUTE RENAL FAILURE TYPE (HCC): Primary | ICD-10-CM

## 2023-09-16 DIAGNOSIS — R65.20 SEPSIS WITH ACUTE RENAL FAILURE WITHOUT SEPTIC SHOCK, DUE TO UNSPECIFIED ORGANISM, UNSPECIFIED ACUTE RENAL FAILURE TYPE (HCC): Primary | ICD-10-CM

## 2023-09-16 LAB
ALBUMIN SERPL-MCNC: 3.7 G/DL (ref 3.2–4.6)
ALBUMIN/GLOB SERPL: 0.8 (ref 0.4–1.6)
ALP SERPL-CCNC: 81 U/L (ref 50–136)
ALT SERPL-CCNC: 48 U/L (ref 12–65)
ANION GAP SERPL CALC-SCNC: 4 MMOL/L (ref 2–11)
AST SERPL-CCNC: 58 U/L (ref 15–37)
BASOPHILS # BLD: 0 K/UL (ref 0–0.2)
BASOPHILS NFR BLD: 1 % (ref 0–2)
BILIRUB SERPL-MCNC: 0.8 MG/DL (ref 0.2–1.1)
BUN SERPL-MCNC: 19 MG/DL (ref 8–23)
CALCIUM SERPL-MCNC: 9.8 MG/DL (ref 8.3–10.4)
CHLORIDE SERPL-SCNC: 106 MMOL/L (ref 101–110)
CO2 SERPL-SCNC: 25 MMOL/L (ref 21–32)
CREAT SERPL-MCNC: 1.4 MG/DL (ref 0.6–1)
DIFFERENTIAL METHOD BLD: ABNORMAL
EOSINOPHIL # BLD: 0 K/UL (ref 0–0.8)
EOSINOPHIL NFR BLD: 0 % (ref 0.5–7.8)
ERYTHROCYTE [DISTWIDTH] IN BLOOD BY AUTOMATED COUNT: 11.8 % (ref 11.9–14.6)
FLUAV RNA SPEC QL NAA+PROBE: NOT DETECTED
FLUBV RNA SPEC QL NAA+PROBE: NOT DETECTED
GLOBULIN SER CALC-MCNC: 4.5 G/DL (ref 2.8–4.5)
GLUCOSE SERPL-MCNC: 209 MG/DL (ref 65–100)
HCT VFR BLD AUTO: 40.7 % (ref 35.8–46.3)
HGB BLD-MCNC: 13.8 G/DL (ref 11.7–15.4)
IMM GRANULOCYTES # BLD AUTO: 0 K/UL (ref 0–0.5)
IMM GRANULOCYTES NFR BLD AUTO: 1 % (ref 0–5)
LACTATE SERPL-SCNC: 2.9 MMOL/L (ref 0.4–2)
LIPASE SERPL-CCNC: 182 U/L (ref 73–393)
LYMPHOCYTES # BLD: 0.7 K/UL (ref 0.5–4.6)
LYMPHOCYTES NFR BLD: 13 % (ref 13–44)
MCH RBC QN AUTO: 32.4 PG (ref 26.1–32.9)
MCHC RBC AUTO-ENTMCNC: 33.9 G/DL (ref 31.4–35)
MCV RBC AUTO: 95.5 FL (ref 82–102)
MONOCYTES # BLD: 0.4 K/UL (ref 0.1–1.3)
MONOCYTES NFR BLD: 7 % (ref 4–12)
NEUTS SEG # BLD: 4.3 K/UL (ref 1.7–8.2)
NEUTS SEG NFR BLD: 78 % (ref 43–78)
NRBC # BLD: 0 K/UL (ref 0–0.2)
PLATELET # BLD AUTO: 163 K/UL (ref 150–450)
PMV BLD AUTO: 9.5 FL (ref 9.4–12.3)
POTASSIUM SERPL-SCNC: 4.7 MMOL/L (ref 3.5–5.1)
PROCALCITONIN SERPL-MCNC: 0.07 NG/ML (ref 0–0.49)
PROT SERPL-MCNC: 8.2 G/DL (ref 6.3–8.2)
RBC # BLD AUTO: 4.26 M/UL (ref 4.05–5.2)
SODIUM SERPL-SCNC: 135 MMOL/L (ref 133–143)
WBC # BLD AUTO: 5.4 K/UL (ref 4.3–11.1)

## 2023-09-16 PROCEDURE — 85025 COMPLETE CBC W/AUTO DIFF WBC: CPT

## 2023-09-16 PROCEDURE — 96365 THER/PROPH/DIAG IV INF INIT: CPT

## 2023-09-16 PROCEDURE — 87186 SC STD MICRODIL/AGAR DIL: CPT

## 2023-09-16 PROCEDURE — 84145 PROCALCITONIN (PCT): CPT

## 2023-09-16 PROCEDURE — 6360000004 HC RX CONTRAST MEDICATION: Performed by: STUDENT IN AN ORGANIZED HEALTH CARE EDUCATION/TRAINING PROGRAM

## 2023-09-16 PROCEDURE — 87635 SARS-COV-2 COVID-19 AMP PRB: CPT

## 2023-09-16 PROCEDURE — 96374 THER/PROPH/DIAG INJ IV PUSH: CPT

## 2023-09-16 PROCEDURE — 71045 X-RAY EXAM CHEST 1 VIEW: CPT

## 2023-09-16 PROCEDURE — 87106 FUNGI IDENTIFICATION YEAST: CPT

## 2023-09-16 PROCEDURE — 80053 COMPREHEN METABOLIC PANEL: CPT

## 2023-09-16 PROCEDURE — 6360000002 HC RX W HCPCS: Performed by: STUDENT IN AN ORGANIZED HEALTH CARE EDUCATION/TRAINING PROGRAM

## 2023-09-16 PROCEDURE — 99285 EMERGENCY DEPT VISIT HI MDM: CPT

## 2023-09-16 PROCEDURE — 83690 ASSAY OF LIPASE: CPT

## 2023-09-16 PROCEDURE — 87040 BLOOD CULTURE FOR BACTERIA: CPT

## 2023-09-16 PROCEDURE — 74177 CT ABD & PELVIS W/CONTRAST: CPT

## 2023-09-16 PROCEDURE — 2580000003 HC RX 258: Performed by: STUDENT IN AN ORGANIZED HEALTH CARE EDUCATION/TRAINING PROGRAM

## 2023-09-16 PROCEDURE — 87205 SMEAR GRAM STAIN: CPT

## 2023-09-16 PROCEDURE — 87502 INFLUENZA DNA AMP PROBE: CPT

## 2023-09-16 PROCEDURE — 83605 ASSAY OF LACTIC ACID: CPT

## 2023-09-16 RX ORDER — MORPHINE SULFATE 4 MG/ML
4 INJECTION, SOLUTION INTRAMUSCULAR; INTRAVENOUS
Status: COMPLETED | OUTPATIENT
Start: 2023-09-16 | End: 2023-09-16

## 2023-09-16 RX ORDER — 0.9 % SODIUM CHLORIDE 0.9 %
100 INTRAVENOUS SOLUTION INTRAVENOUS
Status: DISCONTINUED | OUTPATIENT
Start: 2023-09-16 | End: 2023-09-24 | Stop reason: HOSPADM

## 2023-09-16 RX ORDER — SODIUM CHLORIDE 0.9 % (FLUSH) 0.9 %
10 SYRINGE (ML) INJECTION
Status: DISCONTINUED | OUTPATIENT
Start: 2023-09-16 | End: 2023-09-24 | Stop reason: HOSPADM

## 2023-09-16 RX ORDER — ONDANSETRON 2 MG/ML
4 INJECTION INTRAMUSCULAR; INTRAVENOUS ONCE
Status: COMPLETED | OUTPATIENT
Start: 2023-09-16 | End: 2023-09-17

## 2023-09-16 RX ADMIN — IOPAMIDOL 100 ML: 755 INJECTION, SOLUTION INTRAVENOUS at 23:40

## 2023-09-16 RX ADMIN — PIPERACILLIN AND TAZOBACTAM 4500 MG: 4; .5 INJECTION, POWDER, FOR SOLUTION INTRAVENOUS at 23:14

## 2023-09-16 RX ADMIN — SODIUM CHLORIDE 1641 ML: 9 INJECTION, SOLUTION INTRAVENOUS at 23:14

## 2023-09-16 RX ADMIN — MORPHINE SULFATE 4 MG: 4 INJECTION INTRAVENOUS at 23:53

## 2023-09-16 ASSESSMENT — PAIN DESCRIPTION - ORIENTATION
ORIENTATION: LEFT
ORIENTATION: LEFT;UPPER

## 2023-09-16 ASSESSMENT — LIFESTYLE VARIABLES
HOW OFTEN DO YOU HAVE A DRINK CONTAINING ALCOHOL: NEVER
HOW MANY STANDARD DRINKS CONTAINING ALCOHOL DO YOU HAVE ON A TYPICAL DAY: PATIENT DOES NOT DRINK

## 2023-09-16 ASSESSMENT — PAIN DESCRIPTION - LOCATION
LOCATION: ABDOMEN;BACK
LOCATION: ABDOMEN
LOCATION: BACK

## 2023-09-16 ASSESSMENT — PAIN DESCRIPTION - DESCRIPTORS: DESCRIPTORS: ACHING

## 2023-09-16 ASSESSMENT — PAIN - FUNCTIONAL ASSESSMENT
PAIN_FUNCTIONAL_ASSESSMENT: 0-10
PAIN_FUNCTIONAL_ASSESSMENT: 0-10

## 2023-09-16 ASSESSMENT — PAIN SCALES - GENERAL
PAINLEVEL_OUTOF10: 6
PAINLEVEL_OUTOF10: 5
PAINLEVEL_OUTOF10: 5

## 2023-09-17 ENCOUNTER — ANESTHESIA EVENT (OUTPATIENT)
Dept: SURGERY | Age: 63
End: 2023-09-17
Payer: COMMERCIAL

## 2023-09-17 ENCOUNTER — ANESTHESIA (OUTPATIENT)
Dept: SURGERY | Age: 63
End: 2023-09-17
Payer: COMMERCIAL

## 2023-09-17 PROBLEM — I10 ESSENTIAL HYPERTENSION: Chronic | Status: ACTIVE | Noted: 2022-01-06

## 2023-09-17 PROBLEM — E11.65 TYPE 2 DIABETES MELLITUS WITH HYPERGLYCEMIA, WITHOUT LONG-TERM CURRENT USE OF INSULIN (HCC): Chronic | Status: ACTIVE | Noted: 2021-08-12

## 2023-09-17 PROBLEM — N13.2 URETERAL STONE WITH HYDRONEPHROSIS: Status: ACTIVE | Noted: 2023-09-17

## 2023-09-17 PROBLEM — I10 ESSENTIAL HYPERTENSION: Status: ACTIVE | Noted: 2022-01-06

## 2023-09-17 PROBLEM — N39.0 RECURRENT UTI: Chronic | Status: ACTIVE | Noted: 2021-08-12

## 2023-09-17 PROBLEM — A41.9 SEPSIS (HCC): Status: ACTIVE | Noted: 2023-09-17

## 2023-09-17 PROBLEM — N39.0 RECURRENT UTI: Status: ACTIVE | Noted: 2021-08-12

## 2023-09-17 LAB
ANION GAP SERPL CALC-SCNC: 7 MMOL/L (ref 2–11)
APPEARANCE UR: CLEAR
BACTERIA URNS QL MICRO: 0 /HPF
BASOPHILS # BLD: 0 K/UL (ref 0–0.2)
BASOPHILS NFR BLD: 1 % (ref 0–2)
BILIRUB UR QL: NEGATIVE
BUN SERPL-MCNC: 16 MG/DL (ref 8–23)
CALCIUM SERPL-MCNC: 8.8 MG/DL (ref 8.3–10.4)
CHLORIDE SERPL-SCNC: 108 MMOL/L (ref 101–110)
CO2 SERPL-SCNC: 21 MMOL/L (ref 21–32)
COLOR UR: ABNORMAL
CREAT SERPL-MCNC: 1.3 MG/DL (ref 0.6–1)
DIFFERENTIAL METHOD BLD: ABNORMAL
EOSINOPHIL # BLD: 0 K/UL (ref 0–0.8)
EOSINOPHIL NFR BLD: 0 % (ref 0.5–7.8)
EPI CELLS #/AREA URNS HPF: ABNORMAL /HPF
ERYTHROCYTE [DISTWIDTH] IN BLOOD BY AUTOMATED COUNT: 11.8 % (ref 11.9–14.6)
GLUCOSE BLD STRIP.AUTO-MCNC: 176 MG/DL (ref 65–100)
GLUCOSE BLD STRIP.AUTO-MCNC: 267 MG/DL (ref 65–100)
GLUCOSE BLD STRIP.AUTO-MCNC: 280 MG/DL (ref 65–100)
GLUCOSE BLD STRIP.AUTO-MCNC: 352 MG/DL (ref 65–100)
GLUCOSE SERPL-MCNC: 181 MG/DL (ref 65–100)
GLUCOSE UR STRIP.AUTO-MCNC: 250 MG/DL
HCT VFR BLD AUTO: 38.9 % (ref 35.8–46.3)
HGB BLD-MCNC: 12.8 G/DL (ref 11.7–15.4)
HGB UR QL STRIP: ABNORMAL
IMM GRANULOCYTES # BLD AUTO: 0 K/UL (ref 0–0.5)
IMM GRANULOCYTES NFR BLD AUTO: 0 % (ref 0–5)
KETONES UR QL STRIP.AUTO: NEGATIVE MG/DL
LACTATE SERPL-SCNC: 2 MMOL/L (ref 0.4–2)
LACTATE SERPL-SCNC: 2.6 MMOL/L (ref 0.4–2)
LEUKOCYTE ESTERASE UR QL STRIP.AUTO: NEGATIVE
LYMPHOCYTES # BLD: 0.5 K/UL (ref 0.5–4.6)
LYMPHOCYTES NFR BLD: 12 % (ref 13–44)
MCH RBC QN AUTO: 32.7 PG (ref 26.1–32.9)
MCHC RBC AUTO-ENTMCNC: 32.9 G/DL (ref 31.4–35)
MCV RBC AUTO: 99.2 FL (ref 82–102)
MONOCYTES # BLD: 0.3 K/UL (ref 0.1–1.3)
MONOCYTES NFR BLD: 7 % (ref 4–12)
NEUTS SEG # BLD: 3.5 K/UL (ref 1.7–8.2)
NEUTS SEG NFR BLD: 80 % (ref 43–78)
NITRITE UR QL STRIP.AUTO: NEGATIVE
NRBC # BLD: 0 K/UL (ref 0–0.2)
OTHER OBSERVATIONS: ABNORMAL
PH UR STRIP: 5.5 (ref 5–9)
PLATELET # BLD AUTO: 120 K/UL (ref 150–450)
PMV BLD AUTO: 8.8 FL (ref 9.4–12.3)
POTASSIUM SERPL-SCNC: 4.5 MMOL/L (ref 3.5–5.1)
PROT UR STRIP-MCNC: NEGATIVE MG/DL
RBC # BLD AUTO: 3.92 M/UL (ref 4.05–5.2)
RBC #/AREA URNS HPF: ABNORMAL /HPF
SARS-COV-2 RDRP RESP QL NAA+PROBE: NOT DETECTED
SERVICE CMNT-IMP: ABNORMAL
SODIUM SERPL-SCNC: 136 MMOL/L (ref 133–143)
SOURCE: NOT DETECTED
SP GR UR REFRACTOMETRY: >1.035 (ref 1–1.02)
UROBILINOGEN UR QL STRIP.AUTO: 1 EU/DL (ref 0.2–1)
WBC # BLD AUTO: 4.3 K/UL (ref 4.3–11.1)
WBC URNS QL MICRO: ABNORMAL /HPF
YEAST URNS QL MICRO: ABNORMAL

## 2023-09-17 PROCEDURE — C1769 GUIDE WIRE: HCPCS | Performed by: UROLOGY

## 2023-09-17 PROCEDURE — 3600000004 HC SURGERY LEVEL 4 BASE: Performed by: UROLOGY

## 2023-09-17 PROCEDURE — 99253 IP/OBS CNSLTJ NEW/EST LOW 45: CPT | Performed by: UROLOGY

## 2023-09-17 PROCEDURE — 3700000000 HC ANESTHESIA ATTENDED CARE: Performed by: UROLOGY

## 2023-09-17 PROCEDURE — 96375 TX/PRO/DX INJ NEW DRUG ADDON: CPT

## 2023-09-17 PROCEDURE — 0T778DZ DILATION OF LEFT URETER WITH INTRALUMINAL DEVICE, VIA NATURAL OR ARTIFICIAL OPENING ENDOSCOPIC: ICD-10-PCS | Performed by: UROLOGY

## 2023-09-17 PROCEDURE — 52332 CYSTOSCOPY AND TREATMENT: CPT | Performed by: UROLOGY

## 2023-09-17 PROCEDURE — 87106 FUNGI IDENTIFICATION YEAST: CPT

## 2023-09-17 PROCEDURE — 81001 URINALYSIS AUTO W/SCOPE: CPT

## 2023-09-17 PROCEDURE — 82962 GLUCOSE BLOOD TEST: CPT

## 2023-09-17 PROCEDURE — 2500000003 HC RX 250 WO HCPCS: Performed by: FAMILY MEDICINE

## 2023-09-17 PROCEDURE — 80048 BASIC METABOLIC PNL TOTAL CA: CPT

## 2023-09-17 PROCEDURE — 2500000003 HC RX 250 WO HCPCS: Performed by: NURSE ANESTHETIST, CERTIFIED REGISTERED

## 2023-09-17 PROCEDURE — 2580000003 HC RX 258: Performed by: STUDENT IN AN ORGANIZED HEALTH CARE EDUCATION/TRAINING PROGRAM

## 2023-09-17 PROCEDURE — 3600000014 HC SURGERY LEVEL 4 ADDTL 15MIN: Performed by: UROLOGY

## 2023-09-17 PROCEDURE — 7100000000 HC PACU RECOVERY - FIRST 15 MIN: Performed by: UROLOGY

## 2023-09-17 PROCEDURE — 2709999900 HC NON-CHARGEABLE SUPPLY: Performed by: UROLOGY

## 2023-09-17 PROCEDURE — 6360000002 HC RX W HCPCS: Performed by: FAMILY MEDICINE

## 2023-09-17 PROCEDURE — 2700000000 HC OXYGEN THERAPY PER DAY

## 2023-09-17 PROCEDURE — 83605 ASSAY OF LACTIC ACID: CPT

## 2023-09-17 PROCEDURE — 2580000003 HC RX 258: Performed by: FAMILY MEDICINE

## 2023-09-17 PROCEDURE — C2617 STENT, NON-COR, TEM W/O DEL: HCPCS | Performed by: UROLOGY

## 2023-09-17 PROCEDURE — 3700000001 HC ADD 15 MINUTES (ANESTHESIA): Performed by: UROLOGY

## 2023-09-17 PROCEDURE — 2580000003 HC RX 258: Performed by: NURSE ANESTHETIST, CERTIFIED REGISTERED

## 2023-09-17 PROCEDURE — 85025 COMPLETE CBC W/AUTO DIFF WBC: CPT

## 2023-09-17 PROCEDURE — 6360000002 HC RX W HCPCS: Performed by: NURSE ANESTHETIST, CERTIFIED REGISTERED

## 2023-09-17 PROCEDURE — 1100000000 HC RM PRIVATE

## 2023-09-17 PROCEDURE — 36415 COLL VENOUS BLD VENIPUNCTURE: CPT

## 2023-09-17 PROCEDURE — 7100000001 HC PACU RECOVERY - ADDTL 15 MIN: Performed by: UROLOGY

## 2023-09-17 PROCEDURE — 6360000002 HC RX W HCPCS: Performed by: STUDENT IN AN ORGANIZED HEALTH CARE EDUCATION/TRAINING PROGRAM

## 2023-09-17 PROCEDURE — 6370000000 HC RX 637 (ALT 250 FOR IP): Performed by: FAMILY MEDICINE

## 2023-09-17 PROCEDURE — 87086 URINE CULTURE/COLONY COUNT: CPT

## 2023-09-17 DEVICE — URETERAL STENT
Type: IMPLANTABLE DEVICE | Site: URETER | Status: FUNCTIONAL
Brand: PERCUFLEX™ PLUS

## 2023-09-17 RX ORDER — TAMSULOSIN HYDROCHLORIDE 0.4 MG/1
0.4 CAPSULE ORAL DAILY
Status: DISCONTINUED | OUTPATIENT
Start: 2023-09-17 | End: 2023-09-24 | Stop reason: HOSPADM

## 2023-09-17 RX ORDER — PROMETHAZINE HYDROCHLORIDE 25 MG/1
12.5 TABLET ORAL EVERY 6 HOURS PRN
Status: DISCONTINUED | OUTPATIENT
Start: 2023-09-17 | End: 2023-09-24 | Stop reason: HOSPADM

## 2023-09-17 RX ORDER — DEXTROSE MONOHYDRATE 100 MG/ML
INJECTION, SOLUTION INTRAVENOUS CONTINUOUS PRN
Status: DISCONTINUED | OUTPATIENT
Start: 2023-09-17 | End: 2023-09-24 | Stop reason: HOSPADM

## 2023-09-17 RX ORDER — SODIUM CHLORIDE 9 MG/ML
INJECTION, SOLUTION INTRAVENOUS PRN
Status: DISCONTINUED | OUTPATIENT
Start: 2023-09-17 | End: 2023-09-24 | Stop reason: HOSPADM

## 2023-09-17 RX ORDER — ONDANSETRON 2 MG/ML
INJECTION INTRAMUSCULAR; INTRAVENOUS PRN
Status: DISCONTINUED | OUTPATIENT
Start: 2023-09-17 | End: 2023-09-17 | Stop reason: SDUPTHER

## 2023-09-17 RX ORDER — INSULIN LISPRO 100 [IU]/ML
0-10 INJECTION, SOLUTION INTRAVENOUS; SUBCUTANEOUS
Status: DISCONTINUED | OUTPATIENT
Start: 2023-09-17 | End: 2023-09-24 | Stop reason: HOSPADM

## 2023-09-17 RX ORDER — PROPOFOL 10 MG/ML
INJECTION, EMULSION INTRAVENOUS PRN
Status: DISCONTINUED | OUTPATIENT
Start: 2023-09-17 | End: 2023-09-17 | Stop reason: SDUPTHER

## 2023-09-17 RX ORDER — HYDROMORPHONE HYDROCHLORIDE 1 MG/ML
0.5 INJECTION, SOLUTION INTRAMUSCULAR; INTRAVENOUS; SUBCUTANEOUS EVERY 4 HOURS PRN
Status: DISCONTINUED | OUTPATIENT
Start: 2023-09-17 | End: 2023-09-24 | Stop reason: HOSPADM

## 2023-09-17 RX ORDER — HYDROMORPHONE HYDROCHLORIDE 2 MG/ML
0.5 INJECTION, SOLUTION INTRAMUSCULAR; INTRAVENOUS; SUBCUTANEOUS EVERY 5 MIN PRN
Status: DISCONTINUED | OUTPATIENT
Start: 2023-09-17 | End: 2023-09-17 | Stop reason: HOSPADM

## 2023-09-17 RX ORDER — SUCCINYLCHOLINE CHLORIDE 20 MG/ML
INJECTION INTRAMUSCULAR; INTRAVENOUS PRN
Status: DISCONTINUED | OUTPATIENT
Start: 2023-09-17 | End: 2023-09-17 | Stop reason: SDUPTHER

## 2023-09-17 RX ORDER — SODIUM CHLORIDE, SODIUM LACTATE, POTASSIUM CHLORIDE, CALCIUM CHLORIDE 600; 310; 30; 20 MG/100ML; MG/100ML; MG/100ML; MG/100ML
INJECTION, SOLUTION INTRAVENOUS CONTINUOUS PRN
Status: DISCONTINUED | OUTPATIENT
Start: 2023-09-17 | End: 2023-09-17 | Stop reason: SDUPTHER

## 2023-09-17 RX ORDER — OXYCODONE HYDROCHLORIDE 5 MG/1
5 TABLET ORAL
Status: DISCONTINUED | OUTPATIENT
Start: 2023-09-17 | End: 2023-09-17 | Stop reason: HOSPADM

## 2023-09-17 RX ORDER — INSULIN LISPRO 100 [IU]/ML
0-8 INJECTION, SOLUTION INTRAVENOUS; SUBCUTANEOUS NIGHTLY
Status: DISCONTINUED | OUTPATIENT
Start: 2023-09-17 | End: 2023-09-24 | Stop reason: HOSPADM

## 2023-09-17 RX ORDER — ACETAMINOPHEN 650 MG/1
650 SUPPOSITORY RECTAL EVERY 6 HOURS PRN
Status: DISCONTINUED | OUTPATIENT
Start: 2023-09-17 | End: 2023-09-24 | Stop reason: HOSPADM

## 2023-09-17 RX ORDER — METOPROLOL SUCCINATE 50 MG/1
50 TABLET, EXTENDED RELEASE ORAL DAILY
Status: DISCONTINUED | OUTPATIENT
Start: 2023-09-17 | End: 2023-09-24 | Stop reason: HOSPADM

## 2023-09-17 RX ORDER — SODIUM CHLORIDE 9 MG/ML
INJECTION, SOLUTION INTRAVENOUS CONTINUOUS
Status: DISCONTINUED | OUTPATIENT
Start: 2023-09-17 | End: 2023-09-17

## 2023-09-17 RX ORDER — ROCURONIUM BROMIDE 10 MG/ML
INJECTION, SOLUTION INTRAVENOUS PRN
Status: DISCONTINUED | OUTPATIENT
Start: 2023-09-17 | End: 2023-09-17 | Stop reason: SDUPTHER

## 2023-09-17 RX ORDER — ONDANSETRON 2 MG/ML
4 INJECTION INTRAMUSCULAR; INTRAVENOUS EVERY 6 HOURS PRN
Status: DISCONTINUED | OUTPATIENT
Start: 2023-09-17 | End: 2023-09-24 | Stop reason: HOSPADM

## 2023-09-17 RX ORDER — POTASSIUM CHLORIDE 20 MEQ/1
40 TABLET, EXTENDED RELEASE ORAL PRN
Status: DISCONTINUED | OUTPATIENT
Start: 2023-09-17 | End: 2023-09-24 | Stop reason: HOSPADM

## 2023-09-17 RX ORDER — SODIUM CHLORIDE 0.9 % (FLUSH) 0.9 %
5-40 SYRINGE (ML) INJECTION EVERY 12 HOURS SCHEDULED
Status: DISCONTINUED | OUTPATIENT
Start: 2023-09-17 | End: 2023-09-24 | Stop reason: HOSPADM

## 2023-09-17 RX ORDER — MAGNESIUM SULFATE IN WATER 40 MG/ML
2000 INJECTION, SOLUTION INTRAVENOUS PRN
Status: DISCONTINUED | OUTPATIENT
Start: 2023-09-17 | End: 2023-09-24 | Stop reason: HOSPADM

## 2023-09-17 RX ORDER — SODIUM CHLORIDE 0.9 % (FLUSH) 0.9 %
5-40 SYRINGE (ML) INJECTION PRN
Status: DISCONTINUED | OUTPATIENT
Start: 2023-09-17 | End: 2023-09-24 | Stop reason: HOSPADM

## 2023-09-17 RX ORDER — EZETIMIBE 10 MG/1
10 TABLET ORAL DAILY
Status: DISCONTINUED | OUTPATIENT
Start: 2023-09-17 | End: 2023-09-24 | Stop reason: HOSPADM

## 2023-09-17 RX ORDER — DOXEPIN HYDROCHLORIDE 10 MG/1
10 CAPSULE ORAL NIGHTLY
Status: DISCONTINUED | OUTPATIENT
Start: 2023-09-17 | End: 2023-09-24 | Stop reason: HOSPADM

## 2023-09-17 RX ORDER — POTASSIUM CHLORIDE 7.45 MG/ML
10 INJECTION INTRAVENOUS PRN
Status: DISCONTINUED | OUTPATIENT
Start: 2023-09-17 | End: 2023-09-24 | Stop reason: HOSPADM

## 2023-09-17 RX ORDER — DEXAMETHASONE SODIUM PHOSPHATE 4 MG/ML
INJECTION, SOLUTION INTRA-ARTICULAR; INTRALESIONAL; INTRAMUSCULAR; INTRAVENOUS; SOFT TISSUE PRN
Status: DISCONTINUED | OUTPATIENT
Start: 2023-09-17 | End: 2023-09-17 | Stop reason: SDUPTHER

## 2023-09-17 RX ORDER — SODIUM CHLORIDE, SODIUM LACTATE, POTASSIUM CHLORIDE, CALCIUM CHLORIDE 600; 310; 30; 20 MG/100ML; MG/100ML; MG/100ML; MG/100ML
INJECTION, SOLUTION INTRAVENOUS
Status: COMPLETED | OUTPATIENT
Start: 2023-09-17 | End: 2023-09-17

## 2023-09-17 RX ORDER — OXYCODONE HYDROCHLORIDE 5 MG/1
5 TABLET ORAL EVERY 4 HOURS PRN
Status: DISCONTINUED | OUTPATIENT
Start: 2023-09-17 | End: 2023-09-24 | Stop reason: HOSPADM

## 2023-09-17 RX ORDER — ACETAMINOPHEN 325 MG/1
650 TABLET ORAL EVERY 6 HOURS PRN
Status: DISCONTINUED | OUTPATIENT
Start: 2023-09-17 | End: 2023-09-24 | Stop reason: HOSPADM

## 2023-09-17 RX ORDER — BISACODYL 5 MG/1
5 TABLET, DELAYED RELEASE ORAL DAILY PRN
Status: DISCONTINUED | OUTPATIENT
Start: 2023-09-17 | End: 2023-09-24 | Stop reason: HOSPADM

## 2023-09-17 RX ORDER — POLYETHYLENE GLYCOL 3350 17 G/17G
17 POWDER, FOR SOLUTION ORAL DAILY
Status: DISCONTINUED | OUTPATIENT
Start: 2023-09-17 | End: 2023-09-24 | Stop reason: HOSPADM

## 2023-09-17 RX ORDER — PROCHLORPERAZINE EDISYLATE 5 MG/ML
5 INJECTION INTRAMUSCULAR; INTRAVENOUS
Status: DISCONTINUED | OUTPATIENT
Start: 2023-09-17 | End: 2023-09-17 | Stop reason: HOSPADM

## 2023-09-17 RX ORDER — MAGNESIUM HYDROXIDE/ALUMINUM HYDROXICE/SIMETHICONE 120; 1200; 1200 MG/30ML; MG/30ML; MG/30ML
30 SUSPENSION ORAL EVERY 6 HOURS PRN
Status: DISCONTINUED | OUTPATIENT
Start: 2023-09-17 | End: 2023-09-24 | Stop reason: HOSPADM

## 2023-09-17 RX ORDER — ONDANSETRON 2 MG/ML
4 INJECTION INTRAMUSCULAR; INTRAVENOUS
Status: DISCONTINUED | OUTPATIENT
Start: 2023-09-17 | End: 2023-09-17 | Stop reason: HOSPADM

## 2023-09-17 RX ORDER — LIDOCAINE HYDROCHLORIDE 20 MG/ML
INJECTION, SOLUTION EPIDURAL; INFILTRATION; INTRACAUDAL; PERINEURAL PRN
Status: DISCONTINUED | OUTPATIENT
Start: 2023-09-17 | End: 2023-09-17 | Stop reason: SDUPTHER

## 2023-09-17 RX ADMIN — FENTANYL CITRATE 50 MCG: 50 INJECTION INTRAMUSCULAR; INTRAVENOUS at 08:06

## 2023-09-17 RX ADMIN — SODIUM CHLORIDE, PRESERVATIVE FREE 10 ML: 5 INJECTION INTRAVENOUS at 09:47

## 2023-09-17 RX ADMIN — SODIUM CHLORIDE, SODIUM LACTATE, POTASSIUM CHLORIDE, AND CALCIUM CHLORIDE: 600; 310; 30; 20 INJECTION, SOLUTION INTRAVENOUS at 07:12

## 2023-09-17 RX ADMIN — Medication 160 MG: at 07:53

## 2023-09-17 RX ADMIN — EZETIMIBE 10 MG: 10 TABLET ORAL at 09:46

## 2023-09-17 RX ADMIN — HYDROMORPHONE HYDROCHLORIDE 0.5 MG: 1 INJECTION, SOLUTION INTRAMUSCULAR; INTRAVENOUS; SUBCUTANEOUS at 03:27

## 2023-09-17 RX ADMIN — LIDOCAINE HYDROCHLORIDE 100 MG: 20 INJECTION, SOLUTION EPIDURAL; INFILTRATION; INTRACAUDAL; PERINEURAL at 07:53

## 2023-09-17 RX ADMIN — POLYETHYLENE GLYCOL 3350 17 G: 17 POWDER, FOR SOLUTION ORAL at 09:45

## 2023-09-17 RX ADMIN — SODIUM CHLORIDE, PRESERVATIVE FREE 10 ML: 5 INJECTION INTRAVENOUS at 21:14

## 2023-09-17 RX ADMIN — VANCOMYCIN HYDROCHLORIDE 1750 MG: 10 INJECTION, POWDER, LYOPHILIZED, FOR SOLUTION INTRAVENOUS at 00:07

## 2023-09-17 RX ADMIN — ROCURONIUM BROMIDE 5 MG: 50 INJECTION, SOLUTION INTRAVENOUS at 07:53

## 2023-09-17 RX ADMIN — PHENYLEPHRINE HYDROCHLORIDE 200 MCG: 10 INJECTION INTRAVENOUS at 08:18

## 2023-09-17 RX ADMIN — INSULIN LISPRO 10 UNITS: 100 INJECTION, SOLUTION INTRAVENOUS; SUBCUTANEOUS at 15:17

## 2023-09-17 RX ADMIN — CEFTRIAXONE 1000 MG: 1 INJECTION, POWDER, FOR SOLUTION INTRAMUSCULAR; INTRAVENOUS at 06:11

## 2023-09-17 RX ADMIN — ACETAMINOPHEN 650 MG: 325 TABLET ORAL at 06:33

## 2023-09-17 RX ADMIN — ONDANSETRON 4 MG: 2 INJECTION INTRAMUSCULAR; INTRAVENOUS at 08:13

## 2023-09-17 RX ADMIN — PHENYLEPHRINE HYDROCHLORIDE 200 MCG: 10 INJECTION INTRAVENOUS at 08:00

## 2023-09-17 RX ADMIN — TAMSULOSIN HYDROCHLORIDE 0.4 MG: 0.4 CAPSULE ORAL at 21:12

## 2023-09-17 RX ADMIN — SODIUM CHLORIDE: 9 INJECTION, SOLUTION INTRAVENOUS at 02:44

## 2023-09-17 RX ADMIN — ONDANSETRON 4 MG: 2 INJECTION INTRAMUSCULAR; INTRAVENOUS at 00:00

## 2023-09-17 RX ADMIN — PHENYLEPHRINE HYDROCHLORIDE 200 MCG: 10 INJECTION INTRAVENOUS at 08:10

## 2023-09-17 RX ADMIN — INSULIN LISPRO 6 UNITS: 100 INJECTION, SOLUTION INTRAVENOUS; SUBCUTANEOUS at 18:07

## 2023-09-17 RX ADMIN — DEXAMETHASONE SODIUM PHOSPHATE 4 MG: 4 INJECTION, SOLUTION INTRAMUSCULAR; INTRAVENOUS at 08:13

## 2023-09-17 RX ADMIN — PROPOFOL 150 MG: 10 INJECTION, EMULSION INTRAVENOUS at 07:53

## 2023-09-17 RX ADMIN — SODIUM CHLORIDE, POTASSIUM CHLORIDE, SODIUM LACTATE AND CALCIUM CHLORIDE: 600; 310; 30; 20 INJECTION, SOLUTION INTRAVENOUS at 06:53

## 2023-09-17 RX ADMIN — FENTANYL CITRATE 50 MCG: 50 INJECTION INTRAMUSCULAR; INTRAVENOUS at 07:53

## 2023-09-17 ASSESSMENT — PAIN DESCRIPTION - LOCATION: LOCATION: BACK

## 2023-09-17 ASSESSMENT — PAIN DESCRIPTION - ORIENTATION: ORIENTATION: LEFT

## 2023-09-17 ASSESSMENT — PAIN SCALES - GENERAL
PAINLEVEL_OUTOF10: 7
PAINLEVEL_OUTOF10: 0

## 2023-09-17 ASSESSMENT — PAIN - FUNCTIONAL ASSESSMENT
PAIN_FUNCTIONAL_ASSESSMENT: NONE - DENIES PAIN
PAIN_FUNCTIONAL_ASSESSMENT: 0-10
PAIN_FUNCTIONAL_ASSESSMENT: NONE - DENIES PAIN
PAIN_FUNCTIONAL_ASSESSMENT: PREVENTS OR INTERFERES SOME ACTIVE ACTIVITIES AND ADLS

## 2023-09-17 ASSESSMENT — PAIN DESCRIPTION - DESCRIPTORS: DESCRIPTORS: ACHING;THROBBING

## 2023-09-17 NOTE — PROGRESS NOTES
TRANSFER - IN REPORT:    Verbal report received from [facility], [place] on [employer]  being received from Post-op for routine progression of patient care      Report consisted of patient's Situation, Background, Assessment and   Recommendations(SBAR). Information from the following report(s) Nurse Handoff Report was reviewed with the receiving nurse. Opportunity for questions and clarification was provided. Assessment completed upon patient's arrival to unit and care assumed.

## 2023-09-17 NOTE — BRIEF OP NOTE
Brief Postoperative Note      Patient: Maksim Sim  YOB: 1960  MRN: [id number]    Date of Procedure: 9/17/2023    Pre-Op Diagnosis Codes:     * Ureteral stone [N20.1]    Post-Op Diagnosis: Same       Procedure(s):  CYSTOSCOPY Left URETERAL STENT INSERTION    Surgeon(s):  Cordelia Gorman MD    Assistant:  * No surgical staff found *    Anesthesia: General    Estimated Blood Loss (mL): Minimal    Complications: None    Specimens:   * No specimens in log *    Implants:  Implant Name Type Inv.  Item Serial No.  Lot No. LRB No. Used Action   STENT URET 6FR L22CM HYDR+ PGTL [facility] - [id number]  [facility] 6FR L22CM HYDR+ PGTL TAPR TIP [employer] Current MRK LO  [employer] [facility]- [id number] Left 1 Implanted         Drains: * No LDAs found *    Findings: see dictation      Electronically signed by Cordelia Gorman MD on 9/17/2023 at 8:23 AM

## 2023-09-17 NOTE — ED TRIAGE NOTES
Pt complains of nausea, LUQ abd pain, and intermittent hematuria. Pt complains of dizziness that started today. Reports hx of lap band placement. Reports no UTI per Urology office.

## 2023-09-17 NOTE — PROGRESS NOTES
TRANSFER - OUT REPORT:    Verbal report given to [facility] [address]  being transferred to [facility] for ordered procedure       Report consisted of patient's Situation, Background, Assessment and   Recommendations(SBAR). Information from the following report(s) ED SBAR, Adult Overview, Intake/Output, and MAR was reviewed with the receiving nurse. Lines:   Peripheral IV 09/16/23 Left Antecubital (Active)   Site Assessment Clean, dry & intact 09/17/23 0237   Line Status Flushed;Capped 09/17/23 0237   Line Care Connections checked and tightened 09/17/23 0237   Phlebitis Assessment No symptoms 09/17/23 0237   Infiltration Assessment 0 09/17/23 0237   Alcohol Cap Used No 09/17/23 0237   Dressing Status Clean, dry & intact 09/17/23 0237   Dressing Type Transparent 09/17/23 0237       Peripheral IV 09/16/23 Left; Anterior Forearm (Active)   Site Assessment Clean, dry & intact 09/17/23 0237   Line Status Flushed; Infusing 09/17/23 0237   Line Care Connections checked and tightened 09/17/23 0237   Phlebitis Assessment No symptoms 09/17/23 0237   Infiltration Assessment 0 09/17/23 0237   Alcohol Cap Used No 09/17/23 0237   Dressing Status Clean, dry & intact 09/17/23 0237   Dressing Type Transparent 09/17/23 0237        Opportunity for questions and clarification was provided.       Patient transported with:  [employer]

## 2023-09-17 NOTE — PROGRESS NOTES
Birthday  [birth year]    Full code    No directives    My chart active    Aria unknown    Lives in [facility]

## 2023-09-17 NOTE — OP NOTE
Immediately after the Sensor wire tip passing the distal stone, she had an immediate output of high debris urine. Some of this was collected and sent for culture. I continued the wire until it curled within the renal pelvis. The renal pelvis was obvious as the contrast from her CAT scan from yesterday was still within the collecting system of the left kidney. Once the wire was in the correct position, I deployed a 6-[place] x 22-cm double-J ureteral stent with good curl noted in the renal pelvis fluoroscopically and within the bladder cystoscopically. She had high output of urine once the stent was placed. I then emptied the bladder and removed the scope. A very short portion of string was left upon the stent that would not exit the urethral meatus. PLAN:  The patient will need her infection to be cleared and in 7-10 days she will likely need to return to the operating room for left ureteroscopy, laser lithotripsy and removal of the stone.         La Castellanos MD      [id number]  D:  09/17/2023 8:29  T:  09/17/2023 11:05  JOB #:  [id number]

## 2023-09-17 NOTE — H&P
Hospitalist History and Physical   Admit Date:  2023 10:29 PM   Name:  John Ding   Age:  59 y.o. Sex:  female  :  1960   MRN:  [id number]   Room:  [place] [address]    Presenting Complaint: Nausea and Dizziness     Reason(s) for Admission: Ureteral stone with hydronephrosis [N13.2]     History of Present Illness:   Patient was discussed with the ER provider prior to seeing the patient. John Ding is a 59 y.o. female with medical history of recurrent UTIs, hypertension, and diabetes, who presented with left upper quadrant pain, nausea/vomiting, and hematuria. Her symptoms started about 4 days ago. Initially she had hematuria and nausea. She did go see her urologist on 2023, work-up was negative for infection or stone at that time. They felt that she was probably passing a stone. Her symptoms have continued to worsen since then. Today she began having a fever with the highest at 102. She states that her left upper quadrant pain seems to radiate into her left flank. Work-up in the ER does show and obstructing ureteral stone with some hydronephrosis. The ER contacted urology and they plan to see her in the morning for cystoscopy. Review of Systems:  10 systems reviewed and negative except as noted in HPI. Assessment & Plan:     Principal Problem:    Ureteral stone with hydronephrosis  Severe sepsis  Plan: She received IV vancomycin and Zosyn in the ER prior to knowing source of infection.   Now that we know this is a urinary source, will continue with IV Rocephin  Urology has been consulted and will see her this morning  Generous IV fluids; she did have 30 cc/kg fluid bolus  Active Problems:    Essential hypertension  Plan: Continue home dose metoprolol    Recurrent UTI  Plan: Noted    Type 2 diabetes mellitus with hyperglycemia, without long-term current use of insulin ([address])  Plan: She takes Ozempic, which we do not carry inpatient and metformin which will be held secondary 95.5 82 - 102 FL    MCH 32.4 26.1 - 32.9 PG    MCHC 33.9 31.4 - 35.0 g/dL    RDW 11.8 (L) 11.9 - 14.6 %    Platelets 819 892 - 502 K/uL    MPV 9.5 9.4 - 12.3 FL    nRBC 0.00 0.0 - 0.2 K/uL    Differential Type AUTOMATED      Neutrophils % 78 43 - 78 %    Lymphocytes % 13 13 - 44 %    Monocytes % 7 4.0 - 12.0 %    Eosinophils % 0 (L) 0.5 - 7.8 %    Basophils % 1 0.0 - 2.0 %    Immature Granulocytes 1 0.0 - 5.0 %    Neutrophils Absolute 4.3 1.7 - 8.2 K/UL    Lymphocytes Absolute 0.7 0.5 - 4.6 K/UL    Monocytes Absolute 0.4 0.1 - 1.3 K/UL    Eosinophils Absolute 0.0 0.0 - 0.8 K/UL    Basophils Absolute 0.0 0.0 - 0.2 K/UL    Absolute Immature Granulocyte 0.0 0.0 - 0.5 K/UL   Lactate, Sepsis    Collection Time: 09/16/23 10:44 PM   Result Value Ref Range    Lactic Acid, Sepsis 2.9 (H) 0.4 - 2.0 MMOL/L   Procalcitonin    Collection Time: 09/16/23 10:44 PM   Result Value Ref Range    Procalcitonin 0.07 0.00 - 0.49 ng/mL   COVID-19, Rapid    Collection Time: 09/16/23 10:44 PM    Specimen: Nasopharyngeal   Result Value Ref Range    Source Not detected      SARS-CoV-2, Rapid PENDING    Influenza A/B, Molecular    Collection Time: 09/16/23 10:44 PM    Specimen: Not Specified   Result Value Ref Range    Influenza A, BRISSA Not detected NOTD      Influenza B, BRISSA Not detected NOTD     Lipase    Collection Time: 09/16/23 10:44 PM   Result Value Ref Range    Lipase 182 73 - 393 U/L   Lactate, Sepsis    Collection Time: 09/16/23 11:30 PM   Result Value Ref Range    Lactic Acid, Sepsis 2.6 (H) 0.4 - 2.0 MMOL/L   Urinalysis    Collection Time: 09/17/23 12:28 AM   Result Value Ref Range    Color, UA YELLOW/STRAW      Appearance CLEAR      Specific Gravity, UA >1.035 (H) 1.001 - 1.023    pH, Urine 5.5 5.0 - 9.0      Protein, UA Negative NEG mg/dL    Glucose,  mg/dL    Ketones, Urine Negative NEG mg/dL    Bilirubin Urine Negative NEG      Blood, Urine TRACE (A) NEG      Urobilinogen, Urine 1.0 0.2 - 1.0 EU/dL    Nitrite, Urine

## 2023-09-17 NOTE — CONSULTS
CONSULT                      Date: 9/17/2023        Patient Name: Bienvenido Jenkins     YOB: 1960      Age:  59 y.o. History of Present Illness     59 y.o.   female with medical history of recurrent UTIs, hypertension, and diabetes, who presented to ER with left upper quadrant pain, nausea/vomiting, and hematuria. Her symptoms started about 4 days ago. Initially she had hematuria and nausea. She was seen in our office on 9/14/2023 by NP, work-up was negative for infection or stone at that time. Her symptoms have continued to worsen since then. On 9/16/23, she began having a fever with the highest at 102. She states that her left upper quadrant pain seems to radiate into her left flank. Work-up in the ER revealed an obstructing 6mm left distal ureteral stone with hydronephrosis. Images and report were both personally reviewed. Past Medical History     Past Medical History:   Diagnosis Date    Cancer ([address]) 2009    Breast    Diabetes mellitus ([address]) 2016    Hyperlipidemia     Hypertension     Liver disease Sept. 2017    Fatty liver    Obesity     Type 2 diabetes mellitus without complication ([address]) 8490    UTI (urinary tract infection) 2005    Chronic/recurrent        Past Surgical History     Past Surgical History:   Procedure Laterality Date    BREAST LUMPECTOMY Right 2009    BREAST RECONSTRUCTION  2020    BSHSI PB GASTRIC BYPASS SP  2017    Lap band    CERVICAL LAMINECTOMY  2016    C2 to C7     COSMETIC SURGERY      Breast reconstruction    CYSTOSCOPY  2017    In office    EYE SURGERY  2008 2017    Bilateral tear duct    LUMBAR DISCECTOMY  2008    L4/L5    MASTECTOMY, BILATERAL  2020    OTHER SURGICAL HISTORY Bilateral 2008    bilateral tear ducts - 2008 and 2018     TONSILLECTOMY      Childhood        Medications Prior to Admission     Prior to Admission medications    Medication Sig Start Date End Date Taking?  Authorizing Provider   tamsulosin (FLOMAX) 0.4 MG capsule Take 1 capsule Collection Time: 09/17/23  5:05 AM   Result Value Ref Range    Lactic Acid, Plasma 2.0 0.4 - 2.0 MMOL/L        Imaging/Diagnostics Last 24 Hours     CT ABDOMEN PELVIS W IV CONTRAST Additional Contrast? None    Result Date: 9/17/2023  CT OF THE ABDOMEN AND PELVIS WITH CONTRAST Clinical indication: Abdominal pain. Comparison: 5/3/2023. Procedure: Iodinated contrast was administered intravenously without incident. CT images of the abdomen and pelvis were obtained. CT dose lowering techniques were used, to include: automated exposure control, adjustment for patient size, and or use of iterative reconstruction. Findings: Lung Bases: Mild centrilobular emphysema. No pleural effusion. Heart size is normal without pericardial effusion. Liver and biliary system: The liver is normal in size and configuration without focal lesion. No intra or extrahepatic biliary ductal dilatation is noted. The gallbladder is normal without stones, wall thickening or adjacent fluid. Pancreas: The pancreas is unremarkable. Spleen: The spleen is normal. Adrenals: The adrenal glands are within normal limits. Kidneys: There is a 6 mm calculus in the distal left ureter with moderate left hydronephrosis present. Other nonobstructing left intrarenal calculi are present. Gastrointestinal: Status post gastric banding. No obstruction. The appendix is normal in the right lower quadrant. Mesentery and retroperitoneum: No mesenteric or retroperitoneal adenopathy. No abnormal fluid collection, mass or free air. Pelvis: The urinary bladder is moderately distended with a smooth contour. Rectum is normal. No free fluid. No deep pelvic or inguinal adenopathy. Reproductive: No acute abnormality. Body wall: Normal. Bones: No acute osseous abnormality. Impression: Obstructing 6 mm calculus in the left distal ureter. Other nonobstructing left intrarenal calculi are present. No other acute abnormality seen.   Capri Koch M.D. 9/17/2023 12:05:00 AM    XR CHEST

## 2023-09-17 NOTE — PROGRESS NOTES
TRANSFER - IN REPORT:    Verbal report received from [facility] on [address]  being received from ED for routine progression of patient care      Report consisted of patient's Situation, Background, Assessment and   Recommendations(SBAR). Information from the following report(s) ED SBAR, Adult Overview, MAR, and Recent Results was reviewed with the receiving nurse. Opportunity for questions and clarification was provided. Assessment completed upon patient's arrival to unit and care assumed.

## 2023-09-18 ENCOUNTER — TELEPHONE (OUTPATIENT)
Dept: UROLOGY | Age: 63
End: 2023-09-18

## 2023-09-18 DIAGNOSIS — N20.0 KIDNEY STONE: ICD-10-CM

## 2023-09-18 LAB
ANION GAP SERPL CALC-SCNC: 10 MMOL/L (ref 2–11)
BASOPHILS # BLD: 0 K/UL (ref 0–0.2)
BASOPHILS NFR BLD: 1 % (ref 0–2)
BUN SERPL-MCNC: 13 MG/DL (ref 8–23)
CALCIUM SERPL-MCNC: 8.4 MG/DL (ref 8.3–10.4)
CHLORIDE SERPL-SCNC: 105 MMOL/L (ref 101–110)
CO2 SERPL-SCNC: 22 MMOL/L (ref 21–32)
CREAT SERPL-MCNC: 0.9 MG/DL (ref 0.6–1)
DIFFERENTIAL METHOD BLD: ABNORMAL
EOSINOPHIL # BLD: 0 K/UL (ref 0–0.8)
EOSINOPHIL NFR BLD: 0 % (ref 0.5–7.8)
ERYTHROCYTE [DISTWIDTH] IN BLOOD BY AUTOMATED COUNT: 11.9 % (ref 11.9–14.6)
EST. AVERAGE GLUCOSE BLD GHB EST-MCNC: 194 MG/DL
GLUCOSE BLD STRIP.AUTO-MCNC: 206 MG/DL (ref 65–100)
GLUCOSE BLD STRIP.AUTO-MCNC: 226 MG/DL (ref 65–100)
GLUCOSE BLD STRIP.AUTO-MCNC: 247 MG/DL (ref 65–100)
GLUCOSE BLD STRIP.AUTO-MCNC: 301 MG/DL (ref 65–100)
GLUCOSE SERPL-MCNC: 191 MG/DL (ref 65–100)
HBA1C MFR BLD: 8.4 % (ref 4.8–5.6)
HCT VFR BLD AUTO: 35 % (ref 35.8–46.3)
HGB BLD-MCNC: 12 G/DL (ref 11.7–15.4)
IMM GRANULOCYTES # BLD AUTO: 0 K/UL (ref 0–0.5)
IMM GRANULOCYTES NFR BLD AUTO: 0 % (ref 0–5)
LYMPHOCYTES # BLD: 1.2 K/UL (ref 0.5–4.6)
LYMPHOCYTES NFR BLD: 26 % (ref 13–44)
MAGNESIUM SERPL-MCNC: 1.4 MG/DL (ref 1.8–2.4)
MCH RBC QN AUTO: 32.5 PG (ref 26.1–32.9)
MCHC RBC AUTO-ENTMCNC: 34.3 G/DL (ref 31.4–35)
MCV RBC AUTO: 94.9 FL (ref 82–102)
MONOCYTES # BLD: 0.4 K/UL (ref 0.1–1.3)
MONOCYTES NFR BLD: 8 % (ref 4–12)
NEUTS SEG # BLD: 3.1 K/UL (ref 1.7–8.2)
NEUTS SEG NFR BLD: 65 % (ref 43–78)
NRBC # BLD: 0 K/UL (ref 0–0.2)
PLATELET # BLD AUTO: 118 K/UL (ref 150–450)
PLATELET COMMENT: SLIGHT
PMV BLD AUTO: 9.5 FL (ref 9.4–12.3)
POTASSIUM SERPL-SCNC: 3.3 MMOL/L (ref 3.5–5.1)
RBC # BLD AUTO: 3.69 M/UL (ref 4.05–5.2)
RBC MORPH BLD: ABNORMAL
SERVICE CMNT-IMP: ABNORMAL
SODIUM SERPL-SCNC: 137 MMOL/L (ref 133–143)
WBC # BLD AUTO: 4.7 K/UL (ref 4.3–11.1)
WBC MORPH BLD: ABNORMAL

## 2023-09-18 PROCEDURE — 83036 HEMOGLOBIN GLYCOSYLATED A1C: CPT

## 2023-09-18 PROCEDURE — 36415 COLL VENOUS BLD VENIPUNCTURE: CPT

## 2023-09-18 PROCEDURE — 6370000000 HC RX 637 (ALT 250 FOR IP): Performed by: FAMILY MEDICINE

## 2023-09-18 PROCEDURE — 99231 SBSQ HOSP IP/OBS SF/LOW 25: CPT | Performed by: NURSE PRACTITIONER

## 2023-09-18 PROCEDURE — 6360000002 HC RX W HCPCS: Performed by: INTERNAL MEDICINE

## 2023-09-18 PROCEDURE — 6360000002 HC RX W HCPCS: Performed by: FAMILY MEDICINE

## 2023-09-18 PROCEDURE — 2580000003 HC RX 258: Performed by: FAMILY MEDICINE

## 2023-09-18 PROCEDURE — 85025 COMPLETE CBC W/AUTO DIFF WBC: CPT

## 2023-09-18 PROCEDURE — 83735 ASSAY OF MAGNESIUM: CPT

## 2023-09-18 PROCEDURE — 80048 BASIC METABOLIC PNL TOTAL CA: CPT

## 2023-09-18 PROCEDURE — 2580000003 HC RX 258: Performed by: INTERNAL MEDICINE

## 2023-09-18 PROCEDURE — 1100000000 HC RM PRIVATE

## 2023-09-18 PROCEDURE — 82962 GLUCOSE BLOOD TEST: CPT

## 2023-09-18 RX ORDER — SODIUM CHLORIDE 9 MG/ML
INJECTION, SOLUTION INTRAVENOUS CONTINUOUS
Status: DISCONTINUED | OUTPATIENT
Start: 2023-09-18 | End: 2023-09-19

## 2023-09-18 RX ADMIN — SODIUM CHLORIDE: 9 INJECTION, SOLUTION INTRAVENOUS at 10:30

## 2023-09-18 RX ADMIN — EZETIMIBE 10 MG: 10 TABLET ORAL at 09:48

## 2023-09-18 RX ADMIN — ACETAMINOPHEN 650 MG: 325 TABLET ORAL at 05:13

## 2023-09-18 RX ADMIN — TAMSULOSIN HYDROCHLORIDE 0.4 MG: 0.4 CAPSULE ORAL at 21:29

## 2023-09-18 RX ADMIN — METOPROLOL SUCCINATE 50 MG: 50 TABLET, EXTENDED RELEASE ORAL at 12:15

## 2023-09-18 RX ADMIN — POLYETHYLENE GLYCOL 3350 17 G: 17 POWDER, FOR SOLUTION ORAL at 09:48

## 2023-09-18 RX ADMIN — INSULIN LISPRO 3 UNITS: 100 INJECTION, SOLUTION INTRAVENOUS; SUBCUTANEOUS at 17:40

## 2023-09-18 RX ADMIN — SODIUM CHLORIDE, PRESERVATIVE FREE 10 ML: 5 INJECTION INTRAVENOUS at 21:30

## 2023-09-18 RX ADMIN — CEFTRIAXONE 1000 MG: 1 INJECTION, POWDER, FOR SOLUTION INTRAMUSCULAR; INTRAVENOUS at 05:13

## 2023-09-18 RX ADMIN — INSULIN LISPRO 3 UNITS: 100 INJECTION, SOLUTION INTRAVENOUS; SUBCUTANEOUS at 09:49

## 2023-09-18 RX ADMIN — MEROPENEM 1000 MG: 1 INJECTION, POWDER, FOR SOLUTION INTRAVENOUS at 10:57

## 2023-09-18 RX ADMIN — SODIUM CHLORIDE, PRESERVATIVE FREE 10 ML: 5 INJECTION INTRAVENOUS at 09:55

## 2023-09-18 RX ADMIN — INSULIN LISPRO 8 UNITS: 100 INJECTION, SOLUTION INTRAVENOUS; SUBCUTANEOUS at 12:13

## 2023-09-18 RX ADMIN — SODIUM CHLORIDE 200 MG: 9 INJECTION, SOLUTION INTRAVENOUS at 06:00

## 2023-09-18 NOTE — TELEPHONE ENCOUNTER
----- Message from ANALILIA Schwab sent at 9/18/2023  9:49 AM EDT -----  Can you schedule this patient for a left blue plate with Dr. Frankie Palma in 2 weeks. VSS on RA, A/O x4. Lung sounds clear, BS active, + flatus. Incisions ANA PAULA with steri strips. Patient denied pain. Voiding adequately. Up with assist of 1 + walker. Regular diet.

## 2023-09-18 NOTE — PROGRESS NOTES
Hourly rounds complete this shift, no new complaints at this time, Patient had a fever of 102.9 Tylenol given Temp now 99.7 Positive BC from the lab showing yeast from the left ac, Dr. Madhu Goodman notified, patient started on Eraxis ,  bed in low, locked position, call light and bedside table within reach,  all needs met. Report to day shift nurse.

## 2023-09-18 NOTE — PROGRESS NOTES
Value Ref Range    POC Glucose 301 (H) 65 - 100 mg/dL    Performed by: Osiel        Current Meds:  Current Facility-Administered Medications   Medication Dose Route Frequency    [START ON 9/19/2023] anidulafungin (ERAXIS) 100 mg in sodium chloride 0.9 % 130 mL IVPB  100 mg IntraVENous Q24H    0.9 % sodium chloride infusion   IntraVENous Continuous    doxepin (SINEQUAN) capsule 10 mg  10 mg Oral Nightly    ezetimibe (ZETIA) tablet 10 mg  10 mg Oral Daily    metoprolol succinate (TOPROL XL) extended release tablet 50 mg  50 mg Oral Daily    tamsulosin (FLOMAX) capsule 0.4 mg  0.4 mg Oral Daily    insulin lispro (HUMALOG) injection vial 0-10 Units  0-10 Units SubCUTAneous TID WC    insulin lispro (HUMALOG) injection vial 0-8 Units  0-8 Units SubCUTAneous Nightly    glucose chewable tablet 16 g  4 tablet Oral PRN    dextrose bolus 10% 125 mL  125 mL IntraVENous PRN    Or    dextrose bolus 10% 250 mL  250 mL IntraVENous PRN    glucagon (rDNA) injection 1 mg  1 mg SubCUTAneous PRN    dextrose 10 % infusion   IntraVENous Continuous PRN    sodium chloride flush 0.9 % injection 5-40 mL  5-40 mL IntraVENous 2 times per day    sodium chloride flush 0.9 % injection 5-40 mL  5-40 mL IntraVENous PRN    0.9 % sodium chloride infusion   IntraVENous PRN    potassium chloride (KLOR-CON M) extended release tablet 40 mEq  40 mEq Oral PRN    Or    potassium bicarb-citric acid (EFFER-K) effervescent tablet 40 mEq  40 mEq Oral PRN    Or    potassium chloride 10 mEq/100 mL IVPB (Peripheral Line)  10 mEq IntraVENous PRN    magnesium sulfate 2000 mg in 50 mL IVPB premix  2,000 mg IntraVENous PRN    promethazine (PHENERGAN) tablet 12.5 mg  12.5 mg Oral Q6H PRN    Or    ondansetron (ZOFRAN) injection 4 mg  4 mg IntraVENous Q6H PRN    polyethylene glycol (GLYCOLAX) packet 17 g  17 g Oral Daily    bisacodyl (DULCOLAX) EC tablet 5 mg  5 mg Oral Daily PRN    aluminum & magnesium hydroxide-simethicone (MAALOX) 200-200-20 MG/5ML suspension 30 mL  30 mL Oral Q6H PRN    acetaminophen (TYLENOL) tablet 650 mg  650 mg Oral Q6H PRN    Or    acetaminophen (TYLENOL) suppository 650 mg  650 mg Rectal Q6H PRN    oxyCODONE (ROXICODONE) immediate release tablet 5 mg  5 mg Oral Q4H PRN    HYDROmorphone HCl PF (DILAUDID) injection 0.5 mg  0.5 mg IntraVENous Q4H PRN    sodium chloride 0.9 % bolus 100 mL  100 mL IntraVENous ONCE PRN    sodium chloride flush 0.9 % injection 10 mL  10 mL IntraVENous ONCE PRN       Signed:  Alber Hernandez MD    Part of this note may have been written by using a voice dictation software. The note has been proof read but may still contain some grammatical/other typographical errors.

## 2023-09-18 NOTE — CONSULTS
Infectious Disease Consult      Today's Date: 9/18/2023   Admit Date: 9/16/2023    Impression:   Fungemia: BCX from 9/16 x2 with yeast-pending ID. Source is /infected stone. Left pyelonephritis with obstructing ureteral stone with hydronephrosis s/p cystoscopy with stent placement on 9/17/23. Fevers  TARA: resolved   Diabetes type 2: A1c of 8.4%  Hx of recurrent UTIs (klebsiella, E coli, saccharomyces cerevisiase (3/14/23) and ESBL Kleb 4/23) on chronic keflex 250mg 1 cap daily for about 6-8 months now     Plan:   Agree with Eraxis IV. Discontinue Merrem. Would repeat [address] tomorrow. Follow up on ID of organism in [address] and susceptibilities. Follow up on urine culture. Monitor fever curve. Thank you for this consult. ID will follow along. Anti-infectives:   Vancomycin 9/17-x1 dose  Anidulafungin (Eraxis) 9/18-  Merrem 9/18  Rocephin 9/17-9/18  Zosyn 9/16 x1 dose    Subjective:   Date of Consultation:  September 18, 2023  Date of Admission: 9/16/2023   Referring Provider: Vinay Sheriff  Reason for consult: \"?fungal bacteremia, ESBL UTI\"    Patient is a 59 y.o. female with PMH of recurrent UTIs for 20 years, HTN, diabetes who was admitted on 9/16/23 with left upper quadrant pain, N/V, and hematuria for 4 days then subsequently developed fevers and chills. Pt had hematuria with nausea initially and went to see her urologist Dr. Mc Cannon on 9/14/23 and workup was negative for infection at that time. They felt that she was probably passing a stone. However, her symptoms have continued to worsen since then. She presented with a fever on admission with Tmax of 102. Pt also reports left upper quad pain radiates into her left flank area. In ER, CT abdomen/pelvis showed 6mm obstructing stone with some hydronephrosis. Pt was started on IV vancomycin and Zosyn in ER and then switched to Rocephin.  She underwent cystoscopy on 9/17/23 with findings noted that pt had a complete obstruction of the left side with sediment leaving the left ureteral orifice immediately after wire placement past the stone. Urology plans to take her back to OR in 7-10 days after her infection clears for left ureteroscopy, laser lithotripsy and removal of the stone. Blood culture positive for budding yeast. Pt is hypotensive with WBC of 4.7, fevers of Tmax 102.9. Of note, pt has had frequent UTIs for 20 years. Reports in the last 6 months, she averaged around 3 episodes of UTIs and previous cx organisms include E coli, Klebisella with most recent cx with saccharomyces back in March of this year and ESBL kleb 4/5/23. Pt was placed on Keflex PPX for about 8 months now. Urine cx from 9/17 so far no growth to date. ID is consulted for fungal bacteremia with complicated UTI management. Patient Active Problem List   Diagnosis    Disc displacement, lumbar    Primary insomnia    Cervical spondylosis with myelopathy    Breast asymmetry following reconstructive surgery    Displacement of lumbar intervertebral disc without myelopathy    Elevated AST (SGOT)    Essential hypertension    Recurrent UTI    Encounter for long-term (current) use of medications    Vitamin D deficiency    Mixed hyperlipidemia    Type 2 diabetes mellitus with hyperglycemia, without long-term current use of insulin (HCC)    History of lumpectomy of right breast    History of reduction surgery of left breast    Renal cyst    History of mastectomy    Chronic right shoulder pain    History of bad fall    History of breast cancer    Ureteral stone with hydronephrosis    Sepsis ([address])     Past Medical History:   Diagnosis Date    Cancer ([address]) 2009    Breast    Diabetes mellitus ([address]) 2016    Hyperlipidemia     Hypertension     Liver disease Sept. 2017    Fatty liver    Obesity     Type 2 diabetes mellitus without complication ([address]) 6870    UTI (urinary tract infection) 2005    Chronic/recurrent      No family history on file.    Social History     Tobacco Use    Smoking status:

## 2023-09-18 NOTE — PROGRESS NOTES
Pt resting in bed. Hourly rounds completed. Pt's bed low and locked. Call light and personal items within pt's reach. Pt denies needs at this time. Bedside shift report given to night shift RN.

## 2023-09-18 NOTE — TELEPHONE ENCOUNTER
Procedures: Procedure(s):   CYSTOSCOPY, LEFT  URETEROSCOPY,  LASER LITHOTRIPSY, LEFT URETERAL STENT EXCHANGE   Date: 10/5/2023   Time: 1700   Location: D MAIN OR 01 [facility]

## 2023-09-19 LAB
ANION GAP SERPL CALC-SCNC: 8 MMOL/L (ref 2–11)
BASOPHILS # BLD: 0 K/UL (ref 0–0.2)
BASOPHILS NFR BLD: 1 % (ref 0–2)
BUN SERPL-MCNC: 12 MG/DL (ref 8–23)
CALCIUM SERPL-MCNC: 8.3 MG/DL (ref 8.3–10.4)
CHLORIDE SERPL-SCNC: 107 MMOL/L (ref 101–110)
CO2 SERPL-SCNC: 24 MMOL/L (ref 21–32)
CREAT SERPL-MCNC: 0.7 MG/DL (ref 0.6–1)
DIFFERENTIAL METHOD BLD: ABNORMAL
EOSINOPHIL # BLD: 0 K/UL (ref 0–0.8)
EOSINOPHIL NFR BLD: 0 % (ref 0.5–7.8)
ERYTHROCYTE [DISTWIDTH] IN BLOOD BY AUTOMATED COUNT: 12.1 % (ref 11.9–14.6)
GLUCOSE BLD STRIP.AUTO-MCNC: 176 MG/DL (ref 65–100)
GLUCOSE BLD STRIP.AUTO-MCNC: 218 MG/DL (ref 65–100)
GLUCOSE BLD STRIP.AUTO-MCNC: 248 MG/DL (ref 65–100)
GLUCOSE BLD STRIP.AUTO-MCNC: 282 MG/DL (ref 65–100)
GLUCOSE SERPL-MCNC: 208 MG/DL (ref 65–100)
HCT VFR BLD AUTO: 35.1 % (ref 35.8–46.3)
HGB BLD-MCNC: 12 G/DL (ref 11.7–15.4)
IMM GRANULOCYTES # BLD AUTO: 0 K/UL (ref 0–0.5)
IMM GRANULOCYTES NFR BLD AUTO: 0 % (ref 0–5)
LYMPHOCYTES # BLD: 2 K/UL (ref 0.5–4.6)
LYMPHOCYTES NFR BLD: 42 % (ref 13–44)
MAGNESIUM SERPL-MCNC: 1.5 MG/DL (ref 1.8–2.4)
MCH RBC QN AUTO: 32.5 PG (ref 26.1–32.9)
MCHC RBC AUTO-ENTMCNC: 34.2 G/DL (ref 31.4–35)
MCV RBC AUTO: 95.1 FL (ref 82–102)
MONOCYTES # BLD: 0.5 K/UL (ref 0.1–1.3)
MONOCYTES NFR BLD: 10 % (ref 4–12)
NEUTS SEG # BLD: 2.2 K/UL (ref 1.7–8.2)
NEUTS SEG NFR BLD: 47 % (ref 43–78)
NRBC # BLD: 0 K/UL (ref 0–0.2)
PLATELET # BLD AUTO: 119 K/UL (ref 150–450)
PLATELET COMMENT: SLIGHT
PMV BLD AUTO: 10 FL (ref 9.4–12.3)
POTASSIUM SERPL-SCNC: 3.2 MMOL/L (ref 3.5–5.1)
RBC # BLD AUTO: 3.69 M/UL (ref 4.05–5.2)
RBC MORPH BLD: ABNORMAL
SERVICE CMNT-IMP: ABNORMAL
SODIUM SERPL-SCNC: 139 MMOL/L (ref 133–143)
WBC # BLD AUTO: 4.7 K/UL (ref 4.3–11.1)
WBC MORPH BLD: ABNORMAL

## 2023-09-19 PROCEDURE — 6370000000 HC RX 637 (ALT 250 FOR IP): Performed by: FAMILY MEDICINE

## 2023-09-19 PROCEDURE — 80048 BASIC METABOLIC PNL TOTAL CA: CPT

## 2023-09-19 PROCEDURE — 36415 COLL VENOUS BLD VENIPUNCTURE: CPT

## 2023-09-19 PROCEDURE — 2580000003 HC RX 258: Performed by: FAMILY MEDICINE

## 2023-09-19 PROCEDURE — 85025 COMPLETE CBC W/AUTO DIFF WBC: CPT

## 2023-09-19 PROCEDURE — 87040 BLOOD CULTURE FOR BACTERIA: CPT

## 2023-09-19 PROCEDURE — 82962 GLUCOSE BLOOD TEST: CPT

## 2023-09-19 PROCEDURE — 99232 SBSQ HOSP IP/OBS MODERATE 35: CPT | Performed by: UROLOGY

## 2023-09-19 PROCEDURE — 6360000002 HC RX W HCPCS: Performed by: FAMILY MEDICINE

## 2023-09-19 PROCEDURE — 83735 ASSAY OF MAGNESIUM: CPT

## 2023-09-19 PROCEDURE — 1100000000 HC RM PRIVATE

## 2023-09-19 RX ADMIN — SODIUM CHLORIDE, PRESERVATIVE FREE 10 ML: 5 INJECTION INTRAVENOUS at 22:19

## 2023-09-19 RX ADMIN — INSULIN LISPRO 3 UNITS: 100 INJECTION, SOLUTION INTRAVENOUS; SUBCUTANEOUS at 12:35

## 2023-09-19 RX ADMIN — INSULIN LISPRO 3 UNITS: 100 INJECTION, SOLUTION INTRAVENOUS; SUBCUTANEOUS at 17:16

## 2023-09-19 RX ADMIN — OXYCODONE HYDROCHLORIDE 5 MG: 5 TABLET ORAL at 12:37

## 2023-09-19 RX ADMIN — EZETIMIBE 10 MG: 10 TABLET ORAL at 08:47

## 2023-09-19 RX ADMIN — METOPROLOL SUCCINATE 50 MG: 50 TABLET, EXTENDED RELEASE ORAL at 08:47

## 2023-09-19 RX ADMIN — OXYCODONE HYDROCHLORIDE 5 MG: 5 TABLET ORAL at 08:47

## 2023-09-19 RX ADMIN — TAMSULOSIN HYDROCHLORIDE 0.4 MG: 0.4 CAPSULE ORAL at 22:17

## 2023-09-19 RX ADMIN — SODIUM CHLORIDE 100 MG: 9 INJECTION, SOLUTION INTRAVENOUS at 05:33

## 2023-09-19 ASSESSMENT — PAIN DESCRIPTION - LOCATION
LOCATION: COCCYX
LOCATION: BACK

## 2023-09-19 ASSESSMENT — PAIN SCALES - GENERAL
PAINLEVEL_OUTOF10: 6
PAINLEVEL_OUTOF10: 0
PAINLEVEL_OUTOF10: 0
PAINLEVEL_OUTOF10: 5

## 2023-09-19 ASSESSMENT — PAIN DESCRIPTION - DESCRIPTORS
DESCRIPTORS: ACHING;SORE
DESCRIPTORS: ACHING

## 2023-09-19 NOTE — PROGRESS NOTES
Hourly rounds complete this shift, no new complaints at this time, patient c/o: bed in low, locked position, call light and bedside table within reach,  all needs met. Report to day shift nurse.

## 2023-09-19 NOTE — PROGRESS NOTES
Hospitalist Progress Note   Admit Date:  2023 10:29 PM   Name:  Miryam Clements   Age:  59 y.o. Sex:  female  :  1960   MRN:  [id number]   Room[facility]  [facility]    Presenting/Chief Complaint: Nausea and Dizziness     Reason(s) for Admission: Ureterolithiasis [N20.1]  Ureteral stone with hydronephrosis [N13.2]  Sepsis with acute renal failure without septic shock, due to unspecified organism, unspecified acute renal failure type ([address]) [A41.9, R65.20, N17.9]     Hospital Course:   Miryam Clements is a 59 y.o. female with medical history of  59 y.o. female with medical history of recurrent UTIs, hypertension, and diabetes, who presented with left upper quadrant pain, nausea/vomiting, and hematuria. Subjective & 24hr Events: This morning presenting with some abdominal discomfort. Otherwise denies any nausea or vomiting. Assessment & Plan:    59 y.o. female with medical history of recurrent UTIs, hypertension, and diabetes, who presented with left upper quadrant pain, nausea/vomiting, and hematuria. 1.  Nephrolithiasis with hydronephrosis  Status post stent placement  Urology recommendations appreciated    2. Fungal bacteremia  Started on anidulafungin  Infectious disease consult  Follow cultures    3. Urinary tract infection  History of Klebsiella ESBL  Discontinue ceftriaxone  Start meropenem  Follow urine cultures    4. Hypertension  Continue metoprolol    5. Diabetes mellitus  Insulin sliding scale  Blood sugar checks ACHS    Diet:  ADULT DIET;  Regular; 3 carb choices (45 gm/meal)  VTE prophylaxis: SCD  Code status: Full Code      Non-peripheral Lines and Tubes (if present):          Telemetry (if present):           Hospital Problems:  Principal Problem:    Ureteral stone with hydronephrosis  Active Problems:    Essential hypertension    Recurrent UTI    Type 2 diabetes mellitus with hyperglycemia, without long-term current use of insulin ([address])    Sepsis ([address])  Resolved

## 2023-09-19 NOTE — CARE COORDINATION
Pt chart reviewed for discharge planning. CM met with pt at bedside, verified demographic information/ health insurance. Pt lives with significant other in one level home, is independent with ADLs, ambulates with no DME, and drives . PCP was confirmed, last seen in office a couple of weeks ago. Pt reports no outside services in the home at this time. CM will follow pt plan of care and assist with supportive care referrals pending pt clinical progress. Please consult case management if specific needs arise. 09/19/23 2868   Service Assessment   Patient Orientation Alert and Oriented   Cognition Alert   History Provided By Patient   Primary Caregiver Self   Support Systems Spouse/Significant Other   Patient's Healthcare Decision Maker is: Legal Next of Kin   PCP Verified by CM Yes  Fara Du)   Last Visit to PCP Within last 3 months   Prior Functional Level Independent in ADLs/IADLs   Current Functional Level Independent in ADLs/IADLs   Can patient return to prior living arrangement Yes   Ability to make needs known: Good   Family able to assist with home care needs: Yes   Would you like for me to discuss the discharge plan with any other family members/significant others, and if so, who? Yes  (Gisella Morgan, significant other)   Financial Resources Other (Comment)  (BCBS)   Community Resources None   Social/Functional History   Lives With Significant other   Type of [address] [place] [address] Dr One level   Receives Help From Family   ADL Assistance Independent   Active  Yes   Occupation Retired   Discharge Planning   Type of [address] [place] [address] Prior To Admission None   Potential Assistance Needed N/A   DME Ordered?  No   Potential Assistance Purchasing Medications No   Type of Home Care Services None   Patient expects to be discharged to: House   One/Two Story Residence One story   Condition of Participation: Discharge Planning   The

## 2023-09-20 LAB
ANION GAP SERPL CALC-SCNC: 8 MMOL/L (ref 2–11)
BACTERIA SPEC CULT: ABNORMAL
BASOPHILS # BLD: 0.1 K/UL (ref 0–0.2)
BASOPHILS NFR BLD: 1 % (ref 0–2)
BUN SERPL-MCNC: 9 MG/DL (ref 8–23)
CALCIUM SERPL-MCNC: 8.8 MG/DL (ref 8.3–10.4)
CHLORIDE SERPL-SCNC: 108 MMOL/L (ref 101–110)
CO2 SERPL-SCNC: 24 MMOL/L (ref 21–32)
CREAT SERPL-MCNC: 0.8 MG/DL (ref 0.6–1)
DIFFERENTIAL METHOD BLD: ABNORMAL
EOSINOPHIL # BLD: 0.1 K/UL (ref 0–0.8)
EOSINOPHIL NFR BLD: 1 % (ref 0.5–7.8)
ERYTHROCYTE [DISTWIDTH] IN BLOOD BY AUTOMATED COUNT: 11.9 % (ref 11.9–14.6)
GLUCOSE BLD STRIP.AUTO-MCNC: 185 MG/DL (ref 65–100)
GLUCOSE BLD STRIP.AUTO-MCNC: 245 MG/DL (ref 65–100)
GLUCOSE BLD STRIP.AUTO-MCNC: 284 MG/DL (ref 65–100)
GLUCOSE BLD STRIP.AUTO-MCNC: 313 MG/DL (ref 65–100)
GLUCOSE SERPL-MCNC: 221 MG/DL (ref 65–100)
GRAM STN SPEC: ABNORMAL
HCT VFR BLD AUTO: 35.8 % (ref 35.8–46.3)
HGB BLD-MCNC: 12.2 G/DL (ref 11.7–15.4)
IMM GRANULOCYTES # BLD AUTO: 0.1 K/UL (ref 0–0.5)
IMM GRANULOCYTES NFR BLD AUTO: 1 % (ref 0–5)
LYMPHOCYTES # BLD: 2.1 K/UL (ref 0.5–4.6)
LYMPHOCYTES NFR BLD: 43 % (ref 13–44)
MAGNESIUM SERPL-MCNC: 1.6 MG/DL (ref 1.8–2.4)
MCH RBC QN AUTO: 32.5 PG (ref 26.1–32.9)
MCHC RBC AUTO-ENTMCNC: 34.1 G/DL (ref 31.4–35)
MCV RBC AUTO: 95.5 FL (ref 82–102)
MONOCYTES # BLD: 0.4 K/UL (ref 0.1–1.3)
MONOCYTES NFR BLD: 8 % (ref 4–12)
NEUTS SEG # BLD: 2.1 K/UL (ref 1.7–8.2)
NEUTS SEG NFR BLD: 46 % (ref 43–78)
NRBC # BLD: 0 K/UL (ref 0–0.2)
PLATELET # BLD AUTO: 147 K/UL (ref 150–450)
PLATELET COMMENT: ADEQUATE
PMV BLD AUTO: 9.8 FL (ref 9.4–12.3)
POTASSIUM SERPL-SCNC: 3.2 MMOL/L (ref 3.5–5.1)
RBC # BLD AUTO: 3.75 M/UL (ref 4.05–5.2)
RBC MORPH BLD: ABNORMAL
SERVICE CMNT-IMP: ABNORMAL
SODIUM SERPL-SCNC: 140 MMOL/L (ref 133–143)
WBC # BLD AUTO: 4.9 K/UL (ref 4.3–11.1)
WBC MORPH BLD: ABNORMAL

## 2023-09-20 PROCEDURE — 2580000003 HC RX 258: Performed by: FAMILY MEDICINE

## 2023-09-20 PROCEDURE — 85025 COMPLETE CBC W/AUTO DIFF WBC: CPT

## 2023-09-20 PROCEDURE — 1100000000 HC RM PRIVATE

## 2023-09-20 PROCEDURE — 6370000000 HC RX 637 (ALT 250 FOR IP): Performed by: STUDENT IN AN ORGANIZED HEALTH CARE EDUCATION/TRAINING PROGRAM

## 2023-09-20 PROCEDURE — 83735 ASSAY OF MAGNESIUM: CPT

## 2023-09-20 PROCEDURE — 82962 GLUCOSE BLOOD TEST: CPT

## 2023-09-20 PROCEDURE — 36415 COLL VENOUS BLD VENIPUNCTURE: CPT

## 2023-09-20 PROCEDURE — 80048 BASIC METABOLIC PNL TOTAL CA: CPT

## 2023-09-20 PROCEDURE — 6370000000 HC RX 637 (ALT 250 FOR IP): Performed by: FAMILY MEDICINE

## 2023-09-20 PROCEDURE — 6360000002 HC RX W HCPCS: Performed by: FAMILY MEDICINE

## 2023-09-20 PROCEDURE — 6360000002 HC RX W HCPCS: Performed by: STUDENT IN AN ORGANIZED HEALTH CARE EDUCATION/TRAINING PROGRAM

## 2023-09-20 RX ORDER — POTASSIUM CHLORIDE 20 MEQ/1
40 TABLET, EXTENDED RELEASE ORAL 2 TIMES DAILY WITH MEALS
Status: COMPLETED | OUTPATIENT
Start: 2023-09-20 | End: 2023-09-21

## 2023-09-20 RX ORDER — MAGNESIUM SULFATE IN WATER 40 MG/ML
2000 INJECTION, SOLUTION INTRAVENOUS ONCE
Status: COMPLETED | OUTPATIENT
Start: 2023-09-20 | End: 2023-09-20

## 2023-09-20 RX ORDER — POTASSIUM CHLORIDE 20 MEQ/1
40 TABLET, EXTENDED RELEASE ORAL 2 TIMES DAILY WITH MEALS
Status: DISCONTINUED | OUTPATIENT
Start: 2023-09-20 | End: 2023-09-20

## 2023-09-20 RX ORDER — LANOLIN ALCOHOL/MO/W.PET/CERES
400 CREAM (GRAM) TOPICAL DAILY
Status: COMPLETED | OUTPATIENT
Start: 2023-09-20 | End: 2023-09-22

## 2023-09-20 RX ADMIN — SODIUM CHLORIDE 100 MG: 9 INJECTION, SOLUTION INTRAVENOUS at 05:35

## 2023-09-20 RX ADMIN — INSULIN LISPRO 3 UNITS: 100 INJECTION, SOLUTION INTRAVENOUS; SUBCUTANEOUS at 12:23

## 2023-09-20 RX ADMIN — MAGNESIUM GLUCONATE 500 MG ORAL TABLET 400 MG: 500 TABLET ORAL at 15:41

## 2023-09-20 RX ADMIN — POTASSIUM CHLORIDE 40 MEQ: 1500 TABLET, EXTENDED RELEASE ORAL at 16:42

## 2023-09-20 RX ADMIN — MAGNESIUM SULFATE HEPTAHYDRATE 2000 MG: 40 INJECTION, SOLUTION INTRAVENOUS at 13:11

## 2023-09-20 RX ADMIN — INSULIN LISPRO 8 UNITS: 100 INJECTION, SOLUTION INTRAVENOUS; SUBCUTANEOUS at 16:43

## 2023-09-20 RX ADMIN — EZETIMIBE 10 MG: 10 TABLET ORAL at 09:14

## 2023-09-20 RX ADMIN — SODIUM CHLORIDE, PRESERVATIVE FREE 10 ML: 5 INJECTION INTRAVENOUS at 20:10

## 2023-09-20 RX ADMIN — METOPROLOL SUCCINATE 50 MG: 50 TABLET, EXTENDED RELEASE ORAL at 09:14

## 2023-09-20 RX ADMIN — SODIUM CHLORIDE, PRESERVATIVE FREE 10 ML: 5 INJECTION INTRAVENOUS at 09:15

## 2023-09-20 RX ADMIN — TAMSULOSIN HYDROCHLORIDE 0.4 MG: 0.4 CAPSULE ORAL at 20:08

## 2023-09-20 NOTE — PROGRESS NOTES
Infectious Disease Progress Note      Today's Date: 9/20/2023   Admit Date: 9/16/2023    Impression:   Fungemia: BCX from 9/16 x2 with saccharomyces cerevisiase. Suspect nidus kidneys, cesar on 1st gen cephalosporin. Urine cx with yeast-pending ID but suspect same organism. Left pyelonephritis with obstructing ureteral stone with hydronephrosis s/p cystoscopy with stent placement on 9/17/23. Fevers: resolved. TARA: resolved   Diabetes type 2: A1c of 8.4%  Hx of recurrent UTIs (klebsiella, E coli, saccharomyces cerevisiase (3/14/23) and ESBL Kleb 4/23) on chronic keflex 250mg 1 cap daily for about 6-8 months now     Plan:   Continue anidulafungin IV, pending susceptibilities-was sent out to [address] [place] [address] to run -azole and was told it will take about 48hr to return. Follow repeat [facility]  Follow up on urine culture. ID will follow along. Anti-infectives:   Vancomycin 9/17-x1 dose  Anidulafungin (Eraxis) 9/18-  Merrem 9/18  Rocephin 9/17-9/18  Zosyn 9/16 x1 dose    Subjective: Interval events: chart reviewed. Pt seen and evaluated    Pt is doing well this am. Has no complaints. Sitting up in chair. Afebrile. Non n/v/abdominal pain. No diarrhea. Discussed ID plan and pt verbalized understanding. H&P  Patient is a 59 y.o. female with PMH of recurrent UTIs for 20 years, HTN, diabetes who was admitted on 9/16/23 with left upper quadrant pain, N/V, and hematuria for 4 days then subsequently developed fevers and chills. Pt had hematuria with nausea initially and went to see her urologist Dr. Pj Martins on 9/14/23 and workup was negative for infection at that time. They felt that she was probably passing a stone. However, her symptoms have continued to worsen since then. She presented with a fever on admission with Tmax of 102. Pt also reports left upper quad pain radiates into her left flank area. In ER, CT abdomen/pelvis showed 6mm obstructing stone with some hydronephrosis.  Pt was started on IV mellitus without complication ([address]) 1372    UTI (urinary tract infection)     Chronic/recurrent      No family history on file. Social History     Tobacco Use    Smoking status: Former     Packs/day: 1.50     Years: 40.00     Additional pack years: 0.00     Total pack years: 60.00     Types: Cigarettes     Quit date: 2017     Years since quittin.7    Smokeless tobacco: Never   Substance Use Topics    Alcohol use: Not Currently     Past Surgical History:   Procedure Laterality Date    BREAST LUMPECTOMY Right 2009    BREAST RECONSTRUCTION      BSHSI PB GASTRIC BYPASS SP  2017    Lap band    CERVICAL LAMINECTOMY  2016    C2 to C7     COSMETIC SURGERY      Breast reconstruction    CYSTOSCOPY  2017    In office    CYSTOSCOPY Left 2023    CYSTOSCOPY Left URETERAL STENT INSERTION performed by Chayito Berry MD at [address] [place] [address]  2008    Bilateral tear duct    LUMBAR DISCECTOMY      L4/L5    MASTECTOMY, BILATERAL  2020    OTHER SURGICAL HISTORY Bilateral 2008    bilateral tear ducts -  and      TONSILLECTOMY      Childhood      Prior to Admission medications    Medication Sig Start Date End Date Taking?  Authorizing Provider   tamsulosin (FLOMAX) 0.4 MG capsule Take 1 capsule by mouth daily 23   CHELSEY Calero - CNP   Semaglutide, 1 MG/DOSE, (OZEMPIC, 1 MG/DOSE,) 4 MG/3ML SOPN Inject 1 mg into the skin every 7 days  Patient not taking: Reported on 2023   Chelsea Cintron MD   [facility]) 250 MG capsule Take 1 capsule by mouth once daily 23   CHELSEY Calero - CNP   Cranberry 600 MG TABS  23   Historical Provider, MD   D-Mannose POWD  23   Historical Provider, MD   Liraglutide (VICTOZA) 18 MG/3ML SOPN SC injection Inject 1.2 mg into the skin daily For 7 days then increase to 1.8 mg daily  DX: E11.65  HbA1c: 9.0 23   Chelsea Cintron MD   Insulin Pen Needle 32G X 6 MM MISC Use with Victoza daily 23

## 2023-09-20 NOTE — PROGRESS NOTES
Hospitalist Progress Note   Admit Date:  2023 10:29 PM   Name:  Buzz Farah   Age:  59 y.o. Sex:  female  :  1960   MRN:  [id number]   Room:  [facility]    Presenting/Chief Complaint: Nausea and Dizziness     Reason(s) for Admission: Ureterolithiasis [N20.1]  Ureteral stone with hydronephrosis [N13.2]  Sepsis with acute renal failure without septic shock, due to unspecified organism, unspecified acute renal failure type ([address]) [A41.9, R65.20, N17.9]     Hospital Course:   Buzz Farah is a 59 y.o. female with medical history of  59 y.o. female with medical history of recurrent UTIs, hypertension, and diabetes, who presented with left upper quadrant pain, nausea/vomiting, and hematuria. Subjective & 24hr Events:   Patient is feeling better today. She was sitting comfortably on the chair. otherwise denies any nausea or vomiting. Assessment & Plan:    59 y.o. female with medical history of recurrent UTIs, hypertension, and diabetes, who presented with left upper quadrant pain, nausea/vomiting, and hematuria. Fungemia and bacteremia  Continue IV anidulafungin and awaiting sensitivities. It will take 48 hours for the results to return  Blood cultures x 2 positive for Saccharomyces cerevisiae on  and yeast.   Repeat blood cultures negative to date   urine cultures showed yeast   Infectious disease consulted, appreciate recommendations      3. Left pyelonephritis with obstructing ureteral stone with hydronephrosis  S/p cystoscopy with stent placement on   History of Klebsiella ESBL  Discontinued ceftriaxone  Continue meropenem  Urology following, appreciate recommendations    Hypokalemia  K is 3.2   S/p  40 po x 2  Will repeat bmp again  in am     4. Hypertension  Continue metoprolol    5. Diabetes mellitus  Insulin sliding scale  Blood sugar checks ACHS    Diet:  ADULT DIET;  Regular; 3 carb choices (45 gm/meal)  VTE prophylaxis: SCD  Code status: Full

## 2023-09-21 LAB
ANION GAP SERPL CALC-SCNC: 8 MMOL/L (ref 2–11)
BACTERIA SPEC CULT: ABNORMAL
BASOPHILS # BLD: 0.1 K/UL (ref 0–0.2)
BASOPHILS NFR BLD: 1 % (ref 0–2)
BUN SERPL-MCNC: 7 MG/DL (ref 8–23)
CALCIUM SERPL-MCNC: 8.9 MG/DL (ref 8.3–10.4)
CHLORIDE SERPL-SCNC: 111 MMOL/L (ref 101–110)
CO2 SERPL-SCNC: 23 MMOL/L (ref 21–32)
CREAT SERPL-MCNC: 0.6 MG/DL (ref 0.6–1)
DIFFERENTIAL METHOD BLD: ABNORMAL
EOSINOPHIL # BLD: 0.1 K/UL (ref 0–0.8)
EOSINOPHIL NFR BLD: 1 % (ref 0.5–7.8)
ERYTHROCYTE [DISTWIDTH] IN BLOOD BY AUTOMATED COUNT: 11.9 % (ref 11.9–14.6)
GLUCOSE BLD STRIP.AUTO-MCNC: 176 MG/DL (ref 65–100)
GLUCOSE BLD STRIP.AUTO-MCNC: 273 MG/DL (ref 65–100)
GLUCOSE BLD STRIP.AUTO-MCNC: 299 MG/DL (ref 65–100)
GLUCOSE BLD STRIP.AUTO-MCNC: 340 MG/DL (ref 65–100)
GLUCOSE SERPL-MCNC: 180 MG/DL (ref 65–100)
HCT VFR BLD AUTO: 34.1 % (ref 35.8–46.3)
HGB BLD-MCNC: 11.5 G/DL (ref 11.7–15.4)
IMM GRANULOCYTES # BLD AUTO: 0 K/UL (ref 0–0.5)
IMM GRANULOCYTES NFR BLD AUTO: 0 % (ref 0–5)
LYMPHOCYTES # BLD: 2.7 K/UL (ref 0.5–4.6)
LYMPHOCYTES NFR BLD: 53 % (ref 13–44)
MAGNESIUM SERPL-MCNC: 2 MG/DL (ref 1.8–2.4)
MCH RBC QN AUTO: 31.9 PG (ref 26.1–32.9)
MCHC RBC AUTO-ENTMCNC: 33.7 G/DL (ref 31.4–35)
MCV RBC AUTO: 94.7 FL (ref 82–102)
MONOCYTES # BLD: 0.4 K/UL (ref 0.1–1.3)
MONOCYTES NFR BLD: 8 % (ref 4–12)
NEUTS SEG # BLD: 2 K/UL (ref 1.7–8.2)
NEUTS SEG NFR BLD: 37 % (ref 43–78)
NRBC # BLD: 0 K/UL (ref 0–0.2)
PHOSPHATE SERPL-MCNC: 4 MG/DL (ref 2.3–3.7)
PLATELET # BLD AUTO: 147 K/UL (ref 150–450)
PLATELET COMMENT: ADEQUATE
PMV BLD AUTO: 9.9 FL (ref 9.4–12.3)
POTASSIUM SERPL-SCNC: 3.4 MMOL/L (ref 3.5–5.1)
RBC # BLD AUTO: 3.6 M/UL (ref 4.05–5.2)
RBC MORPH BLD: ABNORMAL
SERVICE CMNT-IMP: ABNORMAL
SODIUM SERPL-SCNC: 142 MMOL/L (ref 133–143)
WBC # BLD AUTO: 5.3 K/UL (ref 4.3–11.1)
WBC MORPH BLD: ABNORMAL

## 2023-09-21 PROCEDURE — 84100 ASSAY OF PHOSPHORUS: CPT

## 2023-09-21 PROCEDURE — 1100000000 HC RM PRIVATE

## 2023-09-21 PROCEDURE — 2580000003 HC RX 258: Performed by: FAMILY MEDICINE

## 2023-09-21 PROCEDURE — 6370000000 HC RX 637 (ALT 250 FOR IP): Performed by: STUDENT IN AN ORGANIZED HEALTH CARE EDUCATION/TRAINING PROGRAM

## 2023-09-21 PROCEDURE — 82962 GLUCOSE BLOOD TEST: CPT

## 2023-09-21 PROCEDURE — 36415 COLL VENOUS BLD VENIPUNCTURE: CPT

## 2023-09-21 PROCEDURE — 6370000000 HC RX 637 (ALT 250 FOR IP): Performed by: FAMILY MEDICINE

## 2023-09-21 PROCEDURE — 83735 ASSAY OF MAGNESIUM: CPT

## 2023-09-21 PROCEDURE — 6360000002 HC RX W HCPCS: Performed by: FAMILY MEDICINE

## 2023-09-21 PROCEDURE — 85025 COMPLETE CBC W/AUTO DIFF WBC: CPT

## 2023-09-21 PROCEDURE — 80048 BASIC METABOLIC PNL TOTAL CA: CPT

## 2023-09-21 RX ADMIN — OXYCODONE HYDROCHLORIDE 5 MG: 5 TABLET ORAL at 10:50

## 2023-09-21 RX ADMIN — EZETIMIBE 10 MG: 10 TABLET ORAL at 10:20

## 2023-09-21 RX ADMIN — INSULIN LISPRO 6 UNITS: 100 INJECTION, SOLUTION INTRAVENOUS; SUBCUTANEOUS at 17:15

## 2023-09-21 RX ADMIN — METOPROLOL SUCCINATE 50 MG: 50 TABLET, EXTENDED RELEASE ORAL at 10:20

## 2023-09-21 RX ADMIN — INSULIN LISPRO 6 UNITS: 100 INJECTION, SOLUTION INTRAVENOUS; SUBCUTANEOUS at 12:45

## 2023-09-21 RX ADMIN — MAGNESIUM GLUCONATE 500 MG ORAL TABLET 400 MG: 500 TABLET ORAL at 10:21

## 2023-09-21 RX ADMIN — INSULIN LISPRO 4 UNITS: 100 INJECTION, SOLUTION INTRAVENOUS; SUBCUTANEOUS at 20:54

## 2023-09-21 RX ADMIN — OXYCODONE HYDROCHLORIDE 5 MG: 5 TABLET ORAL at 17:19

## 2023-09-21 RX ADMIN — SODIUM CHLORIDE, PRESERVATIVE FREE 10 ML: 5 INJECTION INTRAVENOUS at 10:40

## 2023-09-21 RX ADMIN — TAMSULOSIN HYDROCHLORIDE 0.4 MG: 0.4 CAPSULE ORAL at 20:54

## 2023-09-21 RX ADMIN — SODIUM CHLORIDE, PRESERVATIVE FREE 10 ML: 5 INJECTION INTRAVENOUS at 20:57

## 2023-09-21 RX ADMIN — SODIUM CHLORIDE 100 MG: 9 INJECTION, SOLUTION INTRAVENOUS at 08:00

## 2023-09-21 RX ADMIN — POTASSIUM CHLORIDE 40 MEQ: 1500 TABLET, EXTENDED RELEASE ORAL at 10:00

## 2023-09-21 ASSESSMENT — PAIN DESCRIPTION - DESCRIPTORS
DESCRIPTORS: SPASM;ACHING
DESCRIPTORS: SPASM

## 2023-09-21 ASSESSMENT — PAIN DESCRIPTION - LOCATION
LOCATION: BACK
LOCATION: BACK

## 2023-09-21 ASSESSMENT — PAIN SCALES - GENERAL
PAINLEVEL_OUTOF10: 5
PAINLEVEL_OUTOF10: 5

## 2023-09-21 NOTE — PROGRESS NOTES
Hospitalist Progress Note   Admit Date:  2023 10:29 PM   Name:  Helene Glynn   Age:  59 y.o. Sex:  female  :  1960   MRN:  [id number]   Room:  [facility]    Presenting/Chief Complaint: Nausea and Dizziness     Reason(s) for Admission: Ureterolithiasis [N20.1]  Ureteral stone with hydronephrosis [N13.2]  Sepsis with acute renal failure without septic shock, due to unspecified organism, unspecified acute renal failure type ([address]) [A41.9, R65.20, N17.9]     Hospital Course:   Helene Glynn is a 59 y.o. female with medical history of  59 y.o. female with medical history of recurrent UTIs, hypertension, and diabetes, who presented with left upper quadrant pain, nausea/vomiting, and hematuria. Subjective & 24hr Events:   Patient is feeling better today. She was sitting comfortably on the chair. otherwise denies any nausea or vomiting. Assessment & Plan:    59 y.o. female with medical history of recurrent UTIs, hypertension, and diabetes, who presented with left upper quadrant pain, nausea/vomiting, and hematuria. Fungemia and bacteremia  Continue IV anidulafungin and awaiting sensitivities. It will take 48 hours for the results to return  Blood cultures x 2 positive for Saccharomyces cerevisiae on  and yeast.   Repeat blood cultures negative to date   urine cultures showed yeast   Infectious disease consulted, appreciate recommendations      3. Left pyelonephritis with obstructing ureteral stone with hydronephrosis  S/p cystoscopy with stent placement on   History of Klebsiella ESBL  Discontinued ceftriaxone  Continue meropenem  Urology following, appreciate recommendations    Hypokalemia  K is 3.4   S/p  40 po x 1  Will repeat bmp again  in am     4. Hypertension  Continue metoprolol    5. Diabetes mellitus  Insulin sliding scale  Blood sugar checks ACHS    Dispo anticipate hospital stay for 1 to 2 days. Pending susceptibilities. Diet:  ADULT DIET;  Regular; 3 ADEQUATE      Differential Type AUTOMATED     Magnesium    Collection Time: 09/20/23  9:26 AM   Result Value Ref Range    Magnesium 1.6 (L) 1.8 - 2.4 mg/dL   POCT Glucose    Collection Time: 09/20/23 11:26 AM   Result Value Ref Range    POC Glucose 245 (H) 65 - 100 mg/dL    Performed by: Britney    POCT Glucose    Collection Time: 09/20/23  4:19 PM   Result Value Ref Range    POC Glucose 313 (H) 65 - 100 mg/dL    Performed by: NaldoMIN    POCT Glucose    Collection Time: 09/20/23  7:05 PM   Result Value Ref Range    POC Glucose 284 (H) 65 - 100 mg/dL    Performed by: [employer] [facility]    Basic Metabolic Panel w/ Reflex to MG    Collection Time: 09/21/23  6:47 AM   Result Value Ref Range    Sodium 142 133 - 143 mmol/L    Potassium 3.4 (L) 3.5 - 5.1 mmol/L    Chloride 111 (H) 101 - 110 mmol/L    CO2 23 21 - 32 mmol/L    Anion Gap 8 2 - 11 mmol/L    Glucose 180 (H) 65 - 100 mg/dL    BUN 7 (L) 8 - 23 MG/DL    Creatinine 0.60 0.6 - 1.0 MG/DL    Est, Glom Filt Rate >60 >60 ml/min/1.73m2    Calcium 8.9 8.3 - 10.4 MG/DL   Phosphorus    Collection Time: 09/21/23  6:47 AM   Result Value Ref Range    Phosphorus 4.0 (H) 2.3 - 3.7 MG/DL   CBC with Auto Differential    Collection Time: 09/21/23  6:47 AM   Result Value Ref Range    WBC 5.3 4.3 - 11.1 K/uL    RBC 3.60 (L) 4.05 - 5.2 M/uL    Hemoglobin 11.5 (L) 11.7 - 15.4 g/dL    Hematocrit 34.1 (L) 35.8 - 46.3 %    MCV 94.7 82 - 102 FL    MCH 31.9 26.1 - 32.9 PG    MCHC 33.7 31.4 - 35.0 g/dL    RDW 11.9 11.9 - 14.6 %    Platelets 695 (L) 078 - 450 K/uL    MPV 9.9 9.4 - 12.3 FL    nRBC 0.00 0.0 - 0.2 K/uL    Neutrophils % 37 (L) 43 - 78 %    Lymphocytes % 53 (H) 13 - 44 %    Monocytes % 8 4.0 - 12.0 %    Eosinophils % 1 0.5 - 7.8 %    Basophils % 1 0.0 - 2.0 %    Immature Granulocytes 0 0.0 - 5.0 %    Neutrophils Absolute 2.0 1.7 - 8.2 K/UL    Lymphocytes Absolute 2.7 0.5 - 4.6 K/UL    Monocytes Absolute 0.4 0.1 - 1.3 K/UL    Eosinophils Absolute 0.1 0.0 - 0.8 K/UL

## 2023-09-21 NOTE — PROGRESS NOTES
Hourly rounds completed throughout shift. Bed low/locked. Call light within reach. All pt needs are met at this time.

## 2023-09-22 LAB
ANION GAP SERPL CALC-SCNC: 6 MMOL/L (ref 2–11)
BASOPHILS # BLD: 0.1 K/UL (ref 0–0.2)
BASOPHILS NFR BLD: 1 % (ref 0–2)
BUN SERPL-MCNC: 7 MG/DL (ref 8–23)
CALCIUM SERPL-MCNC: 8.5 MG/DL (ref 8.3–10.4)
CHLORIDE SERPL-SCNC: 113 MMOL/L (ref 101–110)
CO2 SERPL-SCNC: 25 MMOL/L (ref 21–32)
CREAT SERPL-MCNC: 0.7 MG/DL (ref 0.6–1)
DIFFERENTIAL METHOD BLD: ABNORMAL
EOSINOPHIL # BLD: 0.1 K/UL (ref 0–0.8)
EOSINOPHIL NFR BLD: 1 % (ref 0.5–7.8)
ERYTHROCYTE [DISTWIDTH] IN BLOOD BY AUTOMATED COUNT: 12 % (ref 11.9–14.6)
GLUCOSE BLD STRIP.AUTO-MCNC: 185 MG/DL (ref 65–100)
GLUCOSE BLD STRIP.AUTO-MCNC: 198 MG/DL (ref 65–100)
GLUCOSE BLD STRIP.AUTO-MCNC: 272 MG/DL (ref 65–100)
GLUCOSE BLD STRIP.AUTO-MCNC: 277 MG/DL (ref 65–100)
GLUCOSE SERPL-MCNC: 223 MG/DL (ref 65–100)
HCT VFR BLD AUTO: 35.1 % (ref 35.8–46.3)
HGB BLD-MCNC: 11.8 G/DL (ref 11.7–15.4)
IMM GRANULOCYTES # BLD AUTO: 0.1 K/UL (ref 0–0.5)
IMM GRANULOCYTES NFR BLD AUTO: 1 % (ref 0–5)
LYMPHOCYTES # BLD: 3 K/UL (ref 0.5–4.6)
LYMPHOCYTES NFR BLD: 51 % (ref 13–44)
MCH RBC QN AUTO: 32.1 PG (ref 26.1–32.9)
MCHC RBC AUTO-ENTMCNC: 33.6 G/DL (ref 31.4–35)
MCV RBC AUTO: 95.4 FL (ref 82–102)
MONOCYTES # BLD: 0.5 K/UL (ref 0.1–1.3)
MONOCYTES NFR BLD: 8 % (ref 4–12)
NEUTS SEG # BLD: 2.4 K/UL (ref 1.7–8.2)
NEUTS SEG NFR BLD: 38 % (ref 43–78)
NRBC # BLD: 0 K/UL (ref 0–0.2)
PHOSPHATE SERPL-MCNC: 3.8 MG/DL (ref 2.3–3.7)
PLATELET # BLD AUTO: 169 K/UL (ref 150–450)
PLATELET COMMENT: ADEQUATE
PMV BLD AUTO: 9.5 FL (ref 9.4–12.3)
POTASSIUM SERPL-SCNC: 3.8 MMOL/L (ref 3.5–5.1)
RBC # BLD AUTO: 3.68 M/UL (ref 4.05–5.2)
RBC MORPH BLD: ABNORMAL
SERVICE CMNT-IMP: ABNORMAL
SODIUM SERPL-SCNC: 144 MMOL/L (ref 133–143)
WBC # BLD AUTO: 6.2 K/UL (ref 4.3–11.1)
WBC MORPH BLD: ABNORMAL

## 2023-09-22 PROCEDURE — 6360000002 HC RX W HCPCS: Performed by: FAMILY MEDICINE

## 2023-09-22 PROCEDURE — 1100000000 HC RM PRIVATE

## 2023-09-22 PROCEDURE — 36415 COLL VENOUS BLD VENIPUNCTURE: CPT

## 2023-09-22 PROCEDURE — 85025 COMPLETE CBC W/AUTO DIFF WBC: CPT

## 2023-09-22 PROCEDURE — 2580000003 HC RX 258: Performed by: FAMILY MEDICINE

## 2023-09-22 PROCEDURE — 6370000000 HC RX 637 (ALT 250 FOR IP): Performed by: STUDENT IN AN ORGANIZED HEALTH CARE EDUCATION/TRAINING PROGRAM

## 2023-09-22 PROCEDURE — 84100 ASSAY OF PHOSPHORUS: CPT

## 2023-09-22 PROCEDURE — 6370000000 HC RX 637 (ALT 250 FOR IP): Performed by: FAMILY MEDICINE

## 2023-09-22 PROCEDURE — 82962 GLUCOSE BLOOD TEST: CPT

## 2023-09-22 PROCEDURE — 80048 BASIC METABOLIC PNL TOTAL CA: CPT

## 2023-09-22 RX ORDER — INSULIN LISPRO 100 [IU]/ML
3 INJECTION, SOLUTION INTRAVENOUS; SUBCUTANEOUS
Status: DISCONTINUED | OUTPATIENT
Start: 2023-09-22 | End: 2023-09-23

## 2023-09-22 RX ORDER — INSULIN GLARGINE 100 [IU]/ML
5 INJECTION, SOLUTION SUBCUTANEOUS NIGHTLY
Status: DISCONTINUED | OUTPATIENT
Start: 2023-09-22 | End: 2023-09-23

## 2023-09-22 RX ADMIN — TAMSULOSIN HYDROCHLORIDE 0.4 MG: 0.4 CAPSULE ORAL at 22:38

## 2023-09-22 RX ADMIN — SODIUM CHLORIDE, PRESERVATIVE FREE 10 ML: 5 INJECTION INTRAVENOUS at 21:20

## 2023-09-22 RX ADMIN — SODIUM CHLORIDE 100 MG: 9 INJECTION, SOLUTION INTRAVENOUS at 06:11

## 2023-09-22 RX ADMIN — INSULIN GLARGINE 5 UNITS: 100 INJECTION, SOLUTION SUBCUTANEOUS at 22:38

## 2023-09-22 RX ADMIN — INSULIN LISPRO 3 UNITS: 100 INJECTION, SOLUTION INTRAVENOUS; SUBCUTANEOUS at 12:00

## 2023-09-22 RX ADMIN — EZETIMIBE 10 MG: 10 TABLET ORAL at 09:21

## 2023-09-22 RX ADMIN — MAGNESIUM GLUCONATE 500 MG ORAL TABLET 400 MG: 500 TABLET ORAL at 09:21

## 2023-09-22 RX ADMIN — INSULIN LISPRO 3 UNITS: 100 INJECTION, SOLUTION INTRAVENOUS; SUBCUTANEOUS at 17:06

## 2023-09-22 RX ADMIN — INSULIN LISPRO 6 UNITS: 100 INJECTION, SOLUTION INTRAVENOUS; SUBCUTANEOUS at 11:59

## 2023-09-22 RX ADMIN — METOPROLOL SUCCINATE 50 MG: 50 TABLET, EXTENDED RELEASE ORAL at 09:21

## 2023-09-22 RX ADMIN — SODIUM CHLORIDE, PRESERVATIVE FREE 10 ML: 5 INJECTION INTRAVENOUS at 09:22

## 2023-09-22 NOTE — PROGRESS NOTES
Hospitalist Progress Note   Admit Date:  2023 10:29 PM   Name:  Anny Nagel   Age:  59 y.o. Sex:  female  :  1960   MRN:  [id number]   Room:  [facility]    Presenting/Chief Complaint: Nausea and Dizziness     Reason(s) for Admission: Ureterolithiasis [N20.1]  Ureteral stone with hydronephrosis [N13.2]  Sepsis with acute renal failure without septic shock, due to unspecified organism, unspecified acute renal failure type ([address]) [A41.9, R65.20, N17.9]     Hospital Course:   Anny Nagel is a 59 y.o. female with medical history of  59 y.o. female with medical history of recurrent UTIs, hypertension, and diabetes, who presented with left upper quadrant pain, nausea/vomiting, and hematuria. Subjective & 24hr Events:   Patient is feeling better today. She was sitting comfortably on the chair. otherwise denies any nausea or vomiting. Assessment & Plan:    59 y.o. female with medical history of recurrent UTIs, hypertension, and diabetes, who presented with left upper quadrant pain, nausea/vomiting, and hematuria. Fungemia and bacteremia  Continue IV anidulafungin and awaiting sensitivities. Results will be back on . Blood cultures x 2 positive for Saccharomyces cerevisiae on  and yeast.   Repeat blood cultures negative to date   urine cultures showed yeast   Infectious disease following , appreciate recommendations      3. Left pyelonephritis with obstructing ureteral stone with hydronephrosis  S/p cystoscopy with stent placement on   History of Klebsiella ESBL  Discontinued ceftriaxone and  meropenem  Urology following, appreciate recommendations    Hypokalemia  Resolved    4. Hypertension  Continue metoprolol    5.   Diabetes mellitus  HbA1c 8.4  Uncontrolled hyperglycemia  Blood glucose ranging around 250s to 300s  Added Lantus 5 units and Humalog 3 units 3 times daily  Insulin sliding scale  Blood sugar checks ACHS    Dispo anticipate hospital stay for 1 to 2 release tablet 40 mEq  40 mEq Oral PRN    Or    potassium bicarb-citric acid (EFFER-K) effervescent tablet 40 mEq  40 mEq Oral PRN    Or    potassium chloride 10 mEq/100 mL IVPB (Peripheral Line)  10 mEq IntraVENous PRN    magnesium sulfate 2000 mg in 50 mL IVPB premix  2,000 mg IntraVENous PRN    promethazine (PHENERGAN) tablet 12.5 mg  12.5 mg Oral Q6H PRN    Or    ondansetron (ZOFRAN) injection 4 mg  4 mg IntraVENous Q6H PRN    polyethylene glycol (GLYCOLAX) packet 17 g  17 g Oral Daily    bisacodyl (DULCOLAX) EC tablet 5 mg  5 mg Oral Daily PRN    aluminum & magnesium hydroxide-simethicone (MAALOX) 200-200-20 MG/5ML suspension 30 mL  30 mL Oral Q6H PRN    acetaminophen (TYLENOL) tablet 650 mg  650 mg Oral Q6H PRN    Or    acetaminophen (TYLENOL) suppository 650 mg  650 mg Rectal Q6H PRN    oxyCODONE (ROXICODONE) immediate release tablet 5 mg  5 mg Oral Q4H PRN    HYDROmorphone HCl PF (DILAUDID) injection 0.5 mg  0.5 mg IntraVENous Q4H PRN    sodium chloride 0.9 % bolus 100 mL  100 mL IntraVENous ONCE PRN    sodium chloride flush 0.9 % injection 10 mL  10 mL IntraVENous ONCE PRN       Signed:  Heather Grider MD    Part of this note may have been written by using a voice dictation software. The note has been proof read but may still contain some grammatical/other typographical errors.

## 2023-09-22 NOTE — DIABETES MGMT
Patient admitted with Ureteral stone with hydronephrosis. Blood glucose ranged 176-340 yesterday with patient receiving Humalog 16 units. Blood glucose this morning was 185. Creatinine 0.70. GFR > 60. Reviewed patient current regimen: Lantus 5 units HS, Humalog 3 units with meals, and Humalog correctional insulin. Patient seen for assessment regarding diabetes management. Patient has a past medical history of HTN, DM, recurrent UTI, HLD. Patient states they have been living with diabetes for 6-8 years and voices no family history of diabetes. Patient states they do have a working glucometer with supplies at home. Patient states uses [employer] at home to monitor blood glucose. Per patient they typically check blood glucose levels multiple times a day. Patient states they are currently taking Metformin 1000 mg BID and is currently taking Victoza, but plans to change to Ozempic at home for management of diabetes. Patient voices that they have not experienced hypoglycemia in the past. Educated regarding hypoglycemia signs, symptoms, and treatment. Patient has not attended formal diabetes education in the past. Patient reports no difficulty with affording their diabetic supplies. Patient given educational material, \"Diabetes Self-Management: A Patient Teaching Guide\" for patient to review at their convenience. Encouraged patient to continue to work on lifestyle modifications and to follow up with primary care provider Yogesh Rodriguez). Patient verbalized understanding and voices no further questions regarding diabetes management at this time.

## 2023-09-22 NOTE — PROGRESS NOTES
Infectious Disease Progress Note      Today's Date: 9/22/2023   Admit Date: 9/16/2023    Impression:   Fungemia: BCX from 9/16 x2 with saccharomyces cerevisiase. Repeat BCX 9/19 negative. Suspect nidus kidneys, cesar on 1st gen cephalosporin. Urine cx with saccharomyces. Left pyelonephritis with obstructing ureteral stone with hydronephrosis s/p cystoscopy with stent placement on 9/17/23. Fevers: resolved. TARA: resolved   Diabetes type 2: A1c of 8.4%  Hx of recurrent UTIs (klebsiella, E coli, saccharomyces cerevisiase (3/14/23) and ESBL Kleb 4/23) on chronic keflex 250mg 1 cap daily for about 6-8 months now     Plan:   Continue anidulafungin IV for now, pending susceptibilities-was sent out to [address] micro this am and states sensi may not be back until Monday. In any case that patient adamantly requests to leave, then would recommend fluconazole 400mg po daily for 14 days from negative [address] with EOT 10/3/23 but it will require primary team to follow up to ensure it's sensitive to -azole. Primary team made aware. ID will follow along. Anti-infectives:   Vancomycin 9/17-x1 dose  Anidulafungin (Eraxis) 9/18-  Merrem 9/18  Rocephin 9/17-9/18  Zosyn 9/16 x1 dose    Subjective: Interval events: chart reviewed. Pt seen and evaluated    Afebrile. Doing well. Up in the chair. Acosta Crescencio to go home soon. She's upset that this is taking this long to find out. H&P  Patient is a 59 y.o. female with PMH of recurrent UTIs for 20 years, HTN, diabetes who was admitted on 9/16/23 with left upper quadrant pain, N/V, and hematuria for 4 days then subsequently developed fevers and chills. Pt had hematuria with nausea initially and went to see her urologist Dr. Mitzi Cueto on 9/14/23 and workup was negative for infection at that time. They felt that she was probably passing a stone. However, her symptoms have continued to worsen since then. She presented with a fever on admission with Tmax of 102.  Pt also reports left upper quad pain radiates into her left flank area. In ER, CT abdomen/pelvis showed 6mm obstructing stone with some hydronephrosis. Pt was started on IV vancomycin and Zosyn in ER and then switched to Rocephin. She underwent cystoscopy on 9/17/23 with findings noted that pt had a complete obstruction of the left side with sediment leaving the left ureteral orifice immediately after wire placement past the stone. Urology plans to take her back to OR in 7-10 days after her infection clears for left ureteroscopy, laser lithotripsy and removal of the stone. Blood culture positive for budding yeast. Pt is hypotensive with WBC of 4.7, fevers of Tmax 102.9. Of note, pt has had frequent UTIs for 20 years. Reports in the last 6 months, she averaged around 3 episodes of UTIs and previous cx organisms include E coli, Klebisella with most recent cx with saccharomyces back in March of this year and ESBL kleb 4/5/23. Pt was placed on Keflex PPX for about 8 months now. Urine cx from 9/17 so far no growth to date. ID is consulted for fungal bacteremia with complicated UTI management.      Patient Active Problem List   Diagnosis    Disc displacement, lumbar    Primary insomnia    Cervical spondylosis with myelopathy    Breast asymmetry following reconstructive surgery    Displacement of lumbar intervertebral disc without myelopathy    Elevated AST (SGOT)    Essential hypertension    Recurrent UTI    Encounter for long-term (current) use of medications    Vitamin D deficiency    Mixed hyperlipidemia    Type 2 diabetes mellitus with hyperglycemia, without long-term current use of insulin (HCC)    History of lumpectomy of right breast    History of reduction surgery of left breast    Renal cyst    History of mastectomy    Chronic right shoulder pain    History of bad fall    History of breast cancer    Ureteral stone with hydronephrosis    Sepsis ([address])    Kidney stone     Past Medical History:   Diagnosis Date    Cancer ([address]) 2009 Breast    Diabetes mellitus ([facility]) 2016    Hyperlipidemia     Hypertension     Liver disease Sept.     Fatty liver    Obesity     Type 2 diabetes mellitus without complication ([facility]) 8551    UTI (urinary tract infection) 2005    Chronic/recurrent      No family history on file. Social History     Tobacco Use    Smoking status: Former     Packs/day: 1.50     Years: 40.00     Additional pack years: 0.00     Total pack years: 60.00     Types: Cigarettes     Quit date: 2017     Years since quittin.7    Smokeless tobacco: Never   Substance Use Topics    Alcohol use: Not Currently     Past Surgical History:   Procedure Laterality Date    BREAST LUMPECTOMY Right 2009    BREAST RECONSTRUCTION      BSI PB GASTRIC BYPASS SP  2017    Lap band    CERVICAL LAMINECTOMY  2016    C2 to C7     COSMETIC SURGERY      Breast reconstruction    CYSTOSCOPY  2017    In office    CYSTOSCOPY Left 2023    CYSTOSCOPY Left URETERAL STENT INSERTION performed by Craige Kehr, MD at [address] [place] [address]  2008    Bilateral tear duct    LUMBAR DISCECTOMY  2008    L4/L5    MASTECTOMY, BILATERAL      OTHER SURGICAL HISTORY Bilateral 2008    bilateral tear ducts -  and      TONSILLECTOMY      Childhood      Prior to Admission medications    Medication Sig Start Date End Date Taking?  Authorizing Provider   tamsulosin (FLOMAX) 0.4 MG capsule Take 1 capsule by mouth daily 23   Bascom Osler, APRN - CNP   Semaglutide, 1 MG/DOSE, (OZEMPIC, 1 MG/DOSE,) 4 MG/3ML SOPN Inject 1 mg into the skin every 7 days  Patient not taking: Reported on 2023   David Ambrosio MD   [facility]) 250 MG capsule Take 1 capsule by mouth once daily 23   Bascom Osler, APRN - CNP   Cranberry 600 MG TABS  23   Historical Provider, MD   D-Mannose POWD  23   Historical Provider, MD   Liraglutide (VICTOZA) 18 MG/3ML SOPN SC injection Inject 1.2 mg into the skin daily For 7 days then

## 2023-09-23 LAB
ANION GAP SERPL CALC-SCNC: 5 MMOL/L (ref 2–11)
BASOPHILS # BLD: 0.1 K/UL (ref 0–0.2)
BASOPHILS NFR BLD: 1 % (ref 0–2)
BUN SERPL-MCNC: 7 MG/DL (ref 8–23)
CALCIUM SERPL-MCNC: 8.8 MG/DL (ref 8.3–10.4)
CHLORIDE SERPL-SCNC: 111 MMOL/L (ref 101–110)
CO2 SERPL-SCNC: 26 MMOL/L (ref 21–32)
CREAT SERPL-MCNC: 0.6 MG/DL (ref 0.6–1)
DIFFERENTIAL METHOD BLD: ABNORMAL
EOSINOPHIL # BLD: 0.2 K/UL (ref 0–0.8)
EOSINOPHIL NFR BLD: 2 % (ref 0.5–7.8)
ERYTHROCYTE [DISTWIDTH] IN BLOOD BY AUTOMATED COUNT: 11.9 % (ref 11.9–14.6)
GLUCOSE BLD STRIP.AUTO-MCNC: 176 MG/DL (ref 65–100)
GLUCOSE BLD STRIP.AUTO-MCNC: 219 MG/DL (ref 65–100)
GLUCOSE BLD STRIP.AUTO-MCNC: 222 MG/DL (ref 65–100)
GLUCOSE BLD STRIP.AUTO-MCNC: 255 MG/DL (ref 65–100)
GLUCOSE SERPL-MCNC: 169 MG/DL (ref 65–100)
HCT VFR BLD AUTO: 34.2 % (ref 35.8–46.3)
HGB BLD-MCNC: 11.7 G/DL (ref 11.7–15.4)
IMM GRANULOCYTES # BLD AUTO: 0.1 K/UL (ref 0–0.5)
IMM GRANULOCYTES NFR BLD AUTO: 1 % (ref 0–5)
LYMPHOCYTES # BLD: 3.4 K/UL (ref 0.5–4.6)
LYMPHOCYTES NFR BLD: 48 % (ref 13–44)
MAGNESIUM SERPL-MCNC: 1.9 MG/DL (ref 1.8–2.4)
MCH RBC QN AUTO: 32.7 PG (ref 26.1–32.9)
MCHC RBC AUTO-ENTMCNC: 34.2 G/DL (ref 31.4–35)
MCV RBC AUTO: 95.5 FL (ref 82–102)
MONOCYTES # BLD: 0.5 K/UL (ref 0.1–1.3)
MONOCYTES NFR BLD: 6 % (ref 4–12)
NEUTS SEG # BLD: 3.2 K/UL (ref 1.7–8.2)
NEUTS SEG NFR BLD: 42 % (ref 43–78)
NRBC # BLD: 0 K/UL (ref 0–0.2)
PHOSPHATE SERPL-MCNC: 3.8 MG/DL (ref 2.3–3.7)
PLATELET # BLD AUTO: 201 K/UL (ref 150–450)
PLATELET COMMENT: ADEQUATE
PMV BLD AUTO: 9.4 FL (ref 9.4–12.3)
POTASSIUM SERPL-SCNC: 3.5 MMOL/L (ref 3.5–5.1)
RBC # BLD AUTO: 3.58 M/UL (ref 4.05–5.2)
RBC MORPH BLD: ABNORMAL
SERVICE CMNT-IMP: ABNORMAL
SODIUM SERPL-SCNC: 142 MMOL/L (ref 133–143)
WBC # BLD AUTO: 7.5 K/UL (ref 4.3–11.1)
WBC MORPH BLD: ABNORMAL

## 2023-09-23 PROCEDURE — 36415 COLL VENOUS BLD VENIPUNCTURE: CPT

## 2023-09-23 PROCEDURE — 6370000000 HC RX 637 (ALT 250 FOR IP): Performed by: FAMILY MEDICINE

## 2023-09-23 PROCEDURE — 84100 ASSAY OF PHOSPHORUS: CPT

## 2023-09-23 PROCEDURE — 6360000002 HC RX W HCPCS: Performed by: FAMILY MEDICINE

## 2023-09-23 PROCEDURE — 2580000003 HC RX 258: Performed by: FAMILY MEDICINE

## 2023-09-23 PROCEDURE — 83735 ASSAY OF MAGNESIUM: CPT

## 2023-09-23 PROCEDURE — 82962 GLUCOSE BLOOD TEST: CPT

## 2023-09-23 PROCEDURE — 6370000000 HC RX 637 (ALT 250 FOR IP): Performed by: STUDENT IN AN ORGANIZED HEALTH CARE EDUCATION/TRAINING PROGRAM

## 2023-09-23 PROCEDURE — 85025 COMPLETE CBC W/AUTO DIFF WBC: CPT

## 2023-09-23 PROCEDURE — 1100000000 HC RM PRIVATE

## 2023-09-23 PROCEDURE — 80048 BASIC METABOLIC PNL TOTAL CA: CPT

## 2023-09-23 PROCEDURE — 76937 US GUIDE VASCULAR ACCESS: CPT

## 2023-09-23 RX ORDER — INSULIN LISPRO 100 [IU]/ML
4 INJECTION, SOLUTION INTRAVENOUS; SUBCUTANEOUS
Status: DISCONTINUED | OUTPATIENT
Start: 2023-09-23 | End: 2023-09-24 | Stop reason: HOSPADM

## 2023-09-23 RX ORDER — INSULIN GLARGINE 100 [IU]/ML
8 INJECTION, SOLUTION SUBCUTANEOUS NIGHTLY
Status: DISCONTINUED | OUTPATIENT
Start: 2023-09-23 | End: 2023-09-24 | Stop reason: HOSPADM

## 2023-09-23 RX ADMIN — POLYETHYLENE GLYCOL 3350 17 G: 17 POWDER, FOR SOLUTION ORAL at 09:38

## 2023-09-23 RX ADMIN — METOPROLOL SUCCINATE 50 MG: 50 TABLET, EXTENDED RELEASE ORAL at 09:39

## 2023-09-23 RX ADMIN — EZETIMIBE 10 MG: 10 TABLET ORAL at 09:38

## 2023-09-23 RX ADMIN — INSULIN LISPRO 3 UNITS: 100 INJECTION, SOLUTION INTRAVENOUS; SUBCUTANEOUS at 12:16

## 2023-09-23 RX ADMIN — TAMSULOSIN HYDROCHLORIDE 0.4 MG: 0.4 CAPSULE ORAL at 20:59

## 2023-09-23 RX ADMIN — SODIUM CHLORIDE, PRESERVATIVE FREE 10 ML: 5 INJECTION INTRAVENOUS at 21:00

## 2023-09-23 RX ADMIN — INSULIN GLARGINE 8 UNITS: 100 INJECTION, SOLUTION SUBCUTANEOUS at 22:01

## 2023-09-23 RX ADMIN — SODIUM CHLORIDE 100 MG: 9 INJECTION, SOLUTION INTRAVENOUS at 12:15

## 2023-09-23 RX ADMIN — INSULIN LISPRO 4 UNITS: 100 INJECTION, SOLUTION INTRAVENOUS; SUBCUTANEOUS at 12:17

## 2023-09-23 RX ADMIN — INSULIN LISPRO 4 UNITS: 100 INJECTION, SOLUTION INTRAVENOUS; SUBCUTANEOUS at 17:18

## 2023-09-23 RX ADMIN — INSULIN LISPRO 3 UNITS: 100 INJECTION, SOLUTION INTRAVENOUS; SUBCUTANEOUS at 17:17

## 2023-09-23 NOTE — PROGRESS NOTES
US Guided PIV access-    Ultrasound was used to find the vein which was compressible and without any ultrasound features of an artery or nerve bundle. Skin was cleaned and disinfected prior to IV puncture. Under real-time ultrasound guidance peripheral access was obtained in the left forearm using 22 G 1.75\" Peripheral IV catheter after 1 attempt(s). Blood return was present and IV flushed without difficulty with no clinical signs of infiltration. IV dressing applied and no immediate complications noted. Patient tolerated the procedure well.

## 2023-09-24 VITALS
SYSTOLIC BLOOD PRESSURE: 142 MMHG | RESPIRATION RATE: 18 BRPM | OXYGEN SATURATION: 93 % | HEART RATE: 63 BPM | TEMPERATURE: 98.6 F | HEIGHT: 64 IN | DIASTOLIC BLOOD PRESSURE: 65 MMHG | BODY MASS INDEX: 28.87 KG/M2 | WEIGHT: 169.09 LBS

## 2023-09-24 PROBLEM — N39.0 UTI (URINARY TRACT INFECTION): Status: ACTIVE | Noted: 2023-09-24

## 2023-09-24 PROBLEM — N12 PYELONEPHRITIS: Status: ACTIVE | Noted: 2023-09-24

## 2023-09-24 PROBLEM — B49 FUNGEMIA: Status: ACTIVE | Noted: 2023-09-24

## 2023-09-24 LAB
BACTERIA SPEC CULT: NORMAL
BACTERIA SPEC CULT: NORMAL
FLUCONAZOLE MIC: NORMAL
FUNGUS ISLT: NORMAL
GLUCOSE BLD STRIP.AUTO-MCNC: 213 MG/DL (ref 65–100)
GLUCOSE BLD STRIP.AUTO-MCNC: 287 MG/DL (ref 65–100)
SERVICE CMNT-IMP: ABNORMAL
SERVICE CMNT-IMP: ABNORMAL
SERVICE CMNT-IMP: NORMAL
SERVICE CMNT-IMP: NORMAL
SPECIMEN SOURCE: NORMAL

## 2023-09-24 PROCEDURE — 82962 GLUCOSE BLOOD TEST: CPT

## 2023-09-24 PROCEDURE — 6370000000 HC RX 637 (ALT 250 FOR IP): Performed by: STUDENT IN AN ORGANIZED HEALTH CARE EDUCATION/TRAINING PROGRAM

## 2023-09-24 PROCEDURE — 2580000003 HC RX 258: Performed by: FAMILY MEDICINE

## 2023-09-24 PROCEDURE — 6360000002 HC RX W HCPCS: Performed by: FAMILY MEDICINE

## 2023-09-24 PROCEDURE — 6370000000 HC RX 637 (ALT 250 FOR IP): Performed by: FAMILY MEDICINE

## 2023-09-24 RX ORDER — FLUCONAZOLE 200 MG/1
400 TABLET ORAL DAILY
Qty: 28 TABLET | Refills: 0 | Status: SHIPPED | OUTPATIENT
Start: 2023-09-24 | End: 2023-10-08

## 2023-09-24 RX ADMIN — EZETIMIBE 10 MG: 10 TABLET ORAL at 08:47

## 2023-09-24 RX ADMIN — INSULIN LISPRO 4 UNITS: 100 INJECTION, SOLUTION INTRAVENOUS; SUBCUTANEOUS at 11:42

## 2023-09-24 RX ADMIN — INSULIN LISPRO 4 UNITS: 100 INJECTION, SOLUTION INTRAVENOUS; SUBCUTANEOUS at 08:46

## 2023-09-24 RX ADMIN — METOPROLOL SUCCINATE 50 MG: 50 TABLET, EXTENDED RELEASE ORAL at 08:46

## 2023-09-24 RX ADMIN — INSULIN LISPRO 6 UNITS: 100 INJECTION, SOLUTION INTRAVENOUS; SUBCUTANEOUS at 11:43

## 2023-09-24 RX ADMIN — SODIUM CHLORIDE 100 MG: 9 INJECTION, SOLUTION INTRAVENOUS at 06:00

## 2023-09-24 RX ADMIN — POLYETHYLENE GLYCOL 3350 17 G: 17 POWDER, FOR SOLUTION ORAL at 08:45

## 2023-09-24 RX ADMIN — INSULIN LISPRO 3 UNITS: 100 INJECTION, SOLUTION INTRAVENOUS; SUBCUTANEOUS at 08:47

## 2023-09-24 RX ADMIN — SODIUM CHLORIDE, PRESERVATIVE FREE 10 ML: 5 INJECTION INTRAVENOUS at 08:48

## 2023-09-24 NOTE — DISCHARGE SUMMARY
Hospitalist Discharge Summary   Admit Date:  2023 10:29 PM   DC Note date: 2023  Name:  Marlene North   Age:  59 y.o. Sex:  female  :  1960   MRN:  [id number]   Room:  [facility]  PCP:  Flavia Falk MD    Presenting Complaint: Nausea and Dizziness     Initial Admission Diagnosis: Ureterolithiasis [N20.1]  Ureteral stone with hydronephrosis [N13.2]  Sepsis with acute renal failure without septic shock, due to unspecified organism, unspecified acute renal failure type ([address]) [A41.9, R65.20, N17.9]     Problem List for this Hospitalization (present on admission):    Principal Problem:    Ureteral stone with hydronephrosis  Active Problems:    Essential hypertension    Type 2 diabetes mellitus with hyperglycemia, without long-term current use of insulin ([address])    Sepsis ([address])    Fungemia    UTI (urinary tract infection)    Pyelonephritis  Resolved Problems:    * No resolved hospital problems. [facility] Course:  Patient is a 59 y.o. female with PMH of recurrent UTIs for 20 years, HTN, diabetes who was admitted on 23 with left upper quadrant pain, N/V, and hematuria for 4 days then subsequently developed fevers and chills. Pt had hematuria with nausea initially and went to see her urologist Dr. Chance Norman on 23 and workup was negative for infection at that time. They felt that she was probably passing a stone. However, her symptoms have continued to worsen since then. She presented with a fever on admission with Tmax of 102. Pt also reports left upper quad pain radiates into her left flank area. In ER, CT abdomen/pelvis showed 6mm obstructing stone with some hydronephrosis. Pt was started on IV vancomycin and Zosyn in ER and then switched to Rocephin. She underwent cystoscopy on 23 with findings noted that pt had a complete obstruction of the left side with sediment leaving the left ureteral orifice immediately after wire placement past the stone.  Urology plans to take her EPITHUA 3-5 09/17/2023 12:28 AM    BACTERIA 0 09/17/2023 12:28 AM        Microbiology:  Results       Procedure Component Value Units Date/Time    Culture, Blood 2 [[id number]] Collected: 09/19/23 0612    Order Status: Completed Specimen: Blood Updated: 09/24/23 0826     Special Requests --        LEFT  HAND       Culture NO GROWTH 5 DAYS       Culture, Blood 1 [[id number]] Collected: 09/19/23 0540    Order Status: Completed Specimen: Blood Updated: 09/24/23 0826     Special Requests --        LEFT  HAND       Culture NO GROWTH 5 DAYS       Culture, Urine [[id number]]  (Abnormal) Collected: 09/17/23 0828    Order Status: Completed Specimen: Urine Updated: 09/21/23 0703     Special Requests CYSTOSCOPIC URINE        Culture       10,000 to 50,000 COLONIES/mL SACCHAROMYCES CEREVISIAE          Blood Culture 2 [[id number]]  (Abnormal) Collected: 09/16/23 2330    Order Status: Completed Specimen: Blood Updated: 09/20/23 0743     Special Requests --        NO SPECIAL REQUESTS  LEFT  FOREARM       Gram stain BUDDING YEAST               AEROBIC AND ANAEROBIC BOTTLES                  CRITICAL RESULT NOT CALLED DUE TO PREVIOUS NOTIFICATION OF CRITICAL RESULT WITHIN THE LAST 24 HOURS.            Culture       YEAST REFER TO [id number] FOR IDENTIFICATION          Blood Culture 1 [[id number]]  (Abnormal) Collected: 09/16/23 2244    Order Status: Completed Specimen: Blood Updated: 09/20/23 1003     Special Requests --        NO SPECIAL REQUESTS  LEFT  Antecubital       Gram stain YEAST               AEROBIC AND ANAEROBIC BOTTLES                  RESULTS VERIFIED, PHONED TO AND READ BACK BY Pita Lynch RN @ 664 ON 09.18.23 BY MS           Culture       SACCHAROMYCES CEREVISIAE SENT TO [facility] FOR TESTING FOR FLUCONAZOLE SUSCEPTIBILITY 9.20.23                  Refer to Blood Culture ID Panel Accession [id number]          COVID-19, Rapid [[id number]] Collected: 09/16/23 2244    Order Status: Completed Specimen: Nasopharyngeal

## 2023-09-24 NOTE — CARE COORDINATION
Pt is for discharge home today with family and no needs/supportive care orders recieved for CM at this time. 09/24/23 1126   Service Assessment   Patient's Healthcare Decision Maker is: Legal Next of Kin   Social/Functional History   Lives With Significant other   Type of [address] [facility]  One level   Receives Help From Family   ADL Assistance Independent   Active  Yes   Occupation Retired   Services At/After Discharge   Transition of [address] [place] [address] Center  ([facility]) [address] Discharge None   [employer] Information Provided? No   Mode of Transport at Discharge Other (see comment)  (Family)   Confirm Follow Up Transport Family   Condition of Participation: Discharge Planning   The Plan for Transition of Care is related to the following treatment goals: Pt will return home at discharge. The Patient and/Or Patient Representative agree with the Discharge Plan? Yes   Freedom of Choice list was provided with basic dialogue that supports the patient's individualized plan of care/goals, treatment preferences, and shares the quality data associated with the providers?   Yes

## 2023-09-24 NOTE — PROGRESS NOTES
Resting in bed. Abx IV infusing. No new complaints overnight. Hourly rounds completed, all needs met this shift. Bed in L/L with call light in reach. Report given to dayshift nurse.

## 2023-09-25 ENCOUNTER — CARE COORDINATION (OUTPATIENT)
Dept: CARE COORDINATION | Facility: CLINIC | Age: 63
End: 2023-09-25

## 2023-09-25 NOTE — CARE COORDINATION
Care Transitions Outreach Attempt    Call within 2 business days of discharge: Yes   Attempted to reach patient for transitions of care follow up. Unable to reach patient. Patient: Buzz Farah Patient : 1960 MRN: [id number]    Last Discharge [address]       Date Complaint Diagnosis Description Type Department Provider    23 Nausea; Dizziness Sepsis with acute renal failure without septic shock, due to unspecified organism, unspecified acute renal failure type ([address]) . .. ED to Hosp-Admission (Discharged) (ADMITTED) SFD6MS Suma Hui MD; Jersey Frausto. .. Was this an external facility discharge?  No Discharge Facility: [facility]    Noted following upcoming appointments from discharge chart review:   [address] [address] [facility] follow up appointment(s):   Future Appointments   Date Time Provider [address]   2023 11:30 AM CHELSEY Calero - CNP [id number] [facility] AMB   10/18/2023  9:00 AM POW LAB POW [facility] AMB   10/24/2023 11:30 AM Chelsea Cintron MD POW [facility] AMB     Non-[facility] follow up appointment(s): none noted

## 2023-09-26 ENCOUNTER — CARE COORDINATION (OUTPATIENT)
Dept: CARE COORDINATION | Facility: CLINIC | Age: 63
End: 2023-09-26

## 2023-09-26 LAB
BACTERIA SPEC CULT: ABNORMAL
GRAM STN SPEC: ABNORMAL
SERVICE CMNT-IMP: ABNORMAL

## 2023-09-27 ENCOUNTER — CARE COORDINATION (OUTPATIENT)
Dept: CARE COORDINATION | Facility: CLINIC | Age: 63
End: 2023-09-27

## 2023-09-27 NOTE — CARE COORDINATION
[facility] Care Transitions Initial Follow Up Call    Call within 2 business days of discharge: Yes    Patient Current Location:  Home: [address] [facility] [address]    Care Transition Nurse contacted the patient by telephone to perform post hospital discharge assessment. Verified name and  with patient as identifiers. Provided introduction to self, and explanation of the Care Transition Nurse role. Patient: Abhijeet Scott Patient : 1960   MRN: [id number]  Reason for Admission: Sepsis  Discharge Date: 23 RARS: Readmission Risk Score: 8.7      Last Discharge [address] [facility]       Date Complaint Diagnosis Description Type Department Provider    23 Nausea; Dizziness Sepsis with acute renal failure without septic shock, due to unspecified organism, unspecified acute renal failure type ([address] ) . .. ED to Hosp-Admission (Discharged) (ADMITTED) SFD6MS Cole Torres MD; Sander Mason... Was this an external facility discharge? No Discharge Facility:     Challenges to be reviewed by the provider   Additional needs identified to be addressed with provider: No  none               Method of communication with provider: none. Patient reports that she is doing well. Patient went to ED with nausea, vomiting, and hematuria. Significant history of UTI's. Procedure performed while inpatient CYSTOSCOPY Left URETERAL STENT INSERTION with upcoming appointment/procedure CYSTOSCOPY, LEFT  URETEROSCOPY,  LASER LITHOTRIPSY, LEFT URETERAL STENT EXCHANGE. Continues to take fluconazole. Denies current symptoms that led to hospitalization. Care Transition Nurse reviewed discharge instructions with patient who verbalized understanding. The patient was given an opportunity to ask questions and does not have any further questions or concerns at this time. Were discharge instructions available to patient? Yes.  Reviewed appropriate site of care based on symptoms and resources available to patient including:

## 2023-09-28 ENCOUNTER — OFFICE VISIT (OUTPATIENT)
Dept: UROLOGY | Age: 63
End: 2023-09-28
Payer: COMMERCIAL

## 2023-09-28 DIAGNOSIS — N39.0 RECURRENT UTI: Primary | ICD-10-CM

## 2023-09-28 DIAGNOSIS — N20.1 LEFT URETERAL STONE: ICD-10-CM

## 2023-09-28 DIAGNOSIS — N20.0 KIDNEY STONE: ICD-10-CM

## 2023-09-28 LAB
BILIRUBIN, URINE, POC: NEGATIVE
BLOOD URINE, POC: NORMAL
GLUCOSE URINE, POC: NEGATIVE
KETONES, URINE, POC: NEGATIVE
LEUKOCYTE ESTERASE, URINE, POC: NORMAL
NITRITE, URINE, POC: POSITIVE
PH, URINE, POC: 5.5 (ref 4.6–8)
PROTEIN,URINE, POC: 300
SPECIFIC GRAVITY, URINE, POC: 1.03 (ref 1–1.03)
URINALYSIS CLARITY, POC: NORMAL
URINALYSIS COLOR, POC: NORMAL
UROBILINOGEN, POC: NORMAL

## 2023-09-28 PROCEDURE — 81003 URINALYSIS AUTO W/O SCOPE: CPT | Performed by: NURSE PRACTITIONER

## 2023-09-28 PROCEDURE — 99214 OFFICE O/P EST MOD 30 MIN: CPT | Performed by: NURSE PRACTITIONER

## 2023-09-28 RX ORDER — ONDANSETRON 4 MG/1
4 TABLET, FILM COATED ORAL EVERY 6 HOURS PRN
Qty: 30 TABLET | Refills: 0 | Status: SHIPPED | OUTPATIENT
Start: 2023-09-28

## 2023-09-28 ASSESSMENT — ENCOUNTER SYMPTOMS: BACK PAIN: 0

## 2023-09-28 NOTE — PROGRESS NOTES
stone is the nidus and infection will persist until stone and stent removed. She was dc home on fluconazole per ID recs. Sensitivity came back showing susceptibility to fluconazole. Here today for fu. Reports she is feeling better. Afebrile. Having some nausea. No irritation from stent. Cont w fluconazole as directed. Stone tx/stent exchange is scheduled for 10/5/23 w Dr. Trang Gunn.               Past Medical History:   Diagnosis Date    Cancer ([facility]) 2009    Breast    Diabetes mellitus ([facility]) 2016    Hyperlipidemia     Hypertension     Liver disease Sept. 2017    Fatty liver    Obesity     Type 2 diabetes mellitus without complication ([address] ) 7017    UTI (urinary tract infection) 2005    Chronic/recurrent     Past Surgical History:   Procedure Laterality Date    BREAST LUMPECTOMY Right 2009    BREAST RECONSTRUCTION  2020    BSHSI PB GASTRIC BYPASS SP  2017    Lap band    CERVICAL LAMINECTOMY  2016    C2 to C7     COSMETIC SURGERY      Breast reconstruction    CYSTOSCOPY  2017    In office    CYSTOSCOPY Left 9/17/2023    CYSTOSCOPY Left URETERAL STENT INSERTION performed by Cordelia Gorman MD at [address] [place] [address]  2008 2017    Bilateral tear duct    LUMBAR DISCECTOMY  2008    L4/L5    MASTECTOMY, BILATERAL  2020    OTHER SURGICAL HISTORY Bilateral 2008    bilateral tear ducts - 2008 and 2018     TONSILLECTOMY      Childhood     Current Outpatient Medications   Medication Sig Dispense Refill    ondansetron (ZOFRAN) 4 MG tablet Take 1 tablet by mouth every 6 hours as needed for Nausea or Vomiting 30 tablet 0    fluconazole (DIFLUCAN) 200 MG tablet Take 2 tablets by mouth daily for 14 days 28 tablet 0    tamsulosin (FLOMAX) 0.4 MG capsule Take 1 capsule by mouth daily 30 capsule 1    Cranberry 600 MG TABS       D-Mannose POWD       Liraglutide (VICTOZA) 18 MG/3ML SOPN SC injection Inject 1.2 mg into the skin daily For 7 days then increase to 1.8 mg daily  DX: E11.65  HbA1c: 9.0 3 mL 5    Insulin Pen Needle 32G

## 2023-09-28 NOTE — CARE COORDINATION
Care Transitions Outreach Attempt    Call within 2 business days of discharge: Yes   Attempted to reach patient for transitions of care follow up. Unable to reach patient. Patient: March Romi Patient : 1960 MRN: [id number]    Last Discharge [address]       Date Complaint Diagnosis Description Type Department Provider    23 Nausea; Dizziness Sepsis with acute renal failure without septic shock, due to unspecified organism, unspecified acute renal failure type ([address]) . .. ED to Hosp-Admission (Discharged) (ADMITTED) SFD6MS Franck Lyon MD; María Klein. .. Was this an external facility discharge?  No Discharge Facility: [facility]    Noted following upcoming appointments from discharge chart review:   [facility] follow up appointment(s):   Future Appointments   Date Time Provider [address]   2023 11:30 AM CHELSEY Miner - CNP [id number] [facility] AMB   10/18/2023  9:00 AM POW LAB POW [facility] AMB   10/24/2023 11:30 AM Renato Cummings MD POW [facility] AMB     Non-[facility] follow up appointment(s): none noted

## 2023-10-03 NOTE — PRE-PROCEDURE INSTRUCTIONS
will need supervision 24 hours after anesthesia  > Use antibacterial soap in shower the night before surgery and on the morning of surgery  > All piercings must be removed prior to arrival.    > Leave all valuables (money and jewelry) at home but bring insurance card and ID on DOS.   > You may be required to pay a deductible or co-pay on the day of your procedure. You can pre-pay by calling [phone] if your surgery is at the [facility] or [phone] if your surgery is at the [facility]. > Do not wear make-up, nail polish, lotions, cologne, perfumes, powders, or oil on skin. Artificial nails are not permitted.

## 2023-10-04 ENCOUNTER — ANESTHESIA EVENT (OUTPATIENT)
Dept: SURGERY | Age: 63
End: 2023-10-04
Payer: COMMERCIAL

## 2023-10-04 LAB
BACTERIA SPEC CULT: ABNORMAL
GRAM STN SPEC: ABNORMAL
SERVICE CMNT-IMP: ABNORMAL

## 2023-10-04 RX ORDER — HYDROMORPHONE HYDROCHLORIDE 2 MG/ML
0.5 INJECTION, SOLUTION INTRAMUSCULAR; INTRAVENOUS; SUBCUTANEOUS EVERY 5 MIN PRN
Status: CANCELLED | OUTPATIENT
Start: 2023-10-04

## 2023-10-04 RX ORDER — OXYCODONE HYDROCHLORIDE 5 MG/1
5 TABLET ORAL
Status: CANCELLED | OUTPATIENT
Start: 2023-10-04 | End: 2023-10-05

## 2023-10-04 RX ORDER — PROCHLORPERAZINE EDISYLATE 5 MG/ML
5 INJECTION INTRAMUSCULAR; INTRAVENOUS
Status: CANCELLED | OUTPATIENT
Start: 2023-10-04 | End: 2023-10-05

## 2023-10-04 RX ORDER — HALOPERIDOL 5 MG/ML
1 INJECTION INTRAMUSCULAR
Status: CANCELLED | OUTPATIENT
Start: 2023-10-04 | End: 2023-10-05

## 2023-10-05 ENCOUNTER — ANESTHESIA (OUTPATIENT)
Dept: SURGERY | Age: 63
End: 2023-10-05
Payer: COMMERCIAL

## 2023-10-05 ENCOUNTER — HOSPITAL ENCOUNTER (OUTPATIENT)
Age: 63
Setting detail: OUTPATIENT SURGERY
Discharge: HOME | End: 2023-10-05
Attending: UROLOGY | Admitting: UROLOGY
Payer: COMMERCIAL

## 2023-10-05 VITALS
BODY MASS INDEX: 25.61 KG/M2 | TEMPERATURE: 97.7 F | RESPIRATION RATE: 14 BRPM | OXYGEN SATURATION: 94 % | SYSTOLIC BLOOD PRESSURE: 150 MMHG | HEART RATE: 95 BPM | WEIGHT: 150 LBS | HEIGHT: 64 IN | DIASTOLIC BLOOD PRESSURE: 75 MMHG

## 2023-10-05 DIAGNOSIS — N20.0 KIDNEY STONE: Primary | ICD-10-CM

## 2023-10-05 LAB
APPEARANCE UR: ABNORMAL
BACTERIA URNS QL MICRO: ABNORMAL /HPF
BILIRUB UR QL: NEGATIVE
COLOR UR: ABNORMAL
GLUCOSE BLD STRIP.AUTO-MCNC: 169 MG/DL (ref 65–100)
GLUCOSE UR STRIP.AUTO-MCNC: NEGATIVE MG/DL
HGB UR QL STRIP: ABNORMAL
KETONES UR QL STRIP.AUTO: ABNORMAL MG/DL
LEUKOCYTE ESTERASE UR QL STRIP.AUTO: ABNORMAL
NITRITE UR QL STRIP.AUTO: POSITIVE
OTHER OBSERVATIONS: ABNORMAL
PH UR STRIP: 5.5 (ref 5–9)
PROT UR STRIP-MCNC: 300 MG/DL
RBC #/AREA URNS HPF: ABNORMAL /HPF
SERVICE CMNT-IMP: ABNORMAL
SP GR UR REFRACTOMETRY: 1.02 (ref 1–1.02)
UROBILINOGEN UR QL STRIP.AUTO: 1 EU/DL (ref 0.2–1)
WBC URNS QL MICRO: >100 /HPF

## 2023-10-05 PROCEDURE — 6360000002 HC RX W HCPCS: Performed by: NURSE ANESTHETIST, CERTIFIED REGISTERED

## 2023-10-05 PROCEDURE — 3600000004 HC SURGERY LEVEL 4 BASE: Performed by: UROLOGY

## 2023-10-05 PROCEDURE — 2580000003 HC RX 258: Performed by: ANESTHESIOLOGY

## 2023-10-05 PROCEDURE — 6370000000 HC RX 637 (ALT 250 FOR IP): Performed by: ANESTHESIOLOGY

## 2023-10-05 PROCEDURE — 2500000003 HC RX 250 WO HCPCS: Performed by: NURSE ANESTHETIST, CERTIFIED REGISTERED

## 2023-10-05 PROCEDURE — 82962 GLUCOSE BLOOD TEST: CPT

## 2023-10-05 PROCEDURE — 2709999900 HC NON-CHARGEABLE SUPPLY: Performed by: UROLOGY

## 2023-10-05 PROCEDURE — 7100000000 HC PACU RECOVERY - FIRST 15 MIN: Performed by: UROLOGY

## 2023-10-05 PROCEDURE — C2617 STENT, NON-COR, TEM W/O DEL: HCPCS | Performed by: UROLOGY

## 2023-10-05 PROCEDURE — C1769 GUIDE WIRE: HCPCS | Performed by: UROLOGY

## 2023-10-05 PROCEDURE — 7100000001 HC PACU RECOVERY - ADDTL 15 MIN: Performed by: UROLOGY

## 2023-10-05 PROCEDURE — 81001 URINALYSIS AUTO W/SCOPE: CPT

## 2023-10-05 PROCEDURE — 7100000011 HC PHASE II RECOVERY - ADDTL 15 MIN: Performed by: UROLOGY

## 2023-10-05 PROCEDURE — 3700000000 HC ANESTHESIA ATTENDED CARE: Performed by: UROLOGY

## 2023-10-05 PROCEDURE — 3700000001 HC ADD 15 MINUTES (ANESTHESIA): Performed by: UROLOGY

## 2023-10-05 PROCEDURE — 6360000002 HC RX W HCPCS: Performed by: UROLOGY

## 2023-10-05 PROCEDURE — 52356 CYSTO/URETERO W/LITHOTRIPSY: CPT | Performed by: UROLOGY

## 2023-10-05 PROCEDURE — 3600000014 HC SURGERY LEVEL 4 ADDTL 15MIN: Performed by: UROLOGY

## 2023-10-05 PROCEDURE — 7100000010 HC PHASE II RECOVERY - FIRST 15 MIN: Performed by: UROLOGY

## 2023-10-05 DEVICE — URETERAL STENT
Type: IMPLANTABLE DEVICE | Site: URETHRA | Status: FUNCTIONAL
Brand: PERCUFLEX™ PLUS

## 2023-10-05 RX ORDER — ROCURONIUM BROMIDE 10 MG/ML
INJECTION, SOLUTION INTRAVENOUS PRN
Status: DISCONTINUED | OUTPATIENT
Start: 2023-10-05 | End: 2023-10-05 | Stop reason: SDUPTHER

## 2023-10-05 RX ORDER — DEXAMETHASONE SODIUM PHOSPHATE 10 MG/ML
INJECTION INTRAMUSCULAR; INTRAVENOUS PRN
Status: DISCONTINUED | OUTPATIENT
Start: 2023-10-05 | End: 2023-10-05 | Stop reason: SDUPTHER

## 2023-10-05 RX ORDER — SODIUM CHLORIDE, SODIUM LACTATE, POTASSIUM CHLORIDE, CALCIUM CHLORIDE 600; 310; 30; 20 MG/100ML; MG/100ML; MG/100ML; MG/100ML
INJECTION, SOLUTION INTRAVENOUS CONTINUOUS
Status: DISCONTINUED | OUTPATIENT
Start: 2023-10-05 | End: 2023-10-05 | Stop reason: HOSPADM

## 2023-10-05 RX ORDER — ONDANSETRON 2 MG/ML
INJECTION INTRAMUSCULAR; INTRAVENOUS PRN
Status: DISCONTINUED | OUTPATIENT
Start: 2023-10-05 | End: 2023-10-05 | Stop reason: SDUPTHER

## 2023-10-05 RX ORDER — HYOSCYAMINE SULFATE 0.12 MG/1
1 TABLET SUBLINGUAL EVERY 4 HOURS PRN
Qty: 30 EACH | Refills: 1 | Status: SHIPPED | OUTPATIENT
Start: 2023-10-05

## 2023-10-05 RX ORDER — SODIUM CHLORIDE 0.9 % (FLUSH) 0.9 %
5-40 SYRINGE (ML) INJECTION EVERY 12 HOURS SCHEDULED
Status: DISCONTINUED | OUTPATIENT
Start: 2023-10-05 | End: 2023-10-05 | Stop reason: HOSPADM

## 2023-10-05 RX ORDER — SUCCINYLCHOLINE/SOD CL,ISO/PF 200MG/10ML
SYRINGE (ML) INTRAVENOUS PRN
Status: DISCONTINUED | OUTPATIENT
Start: 2023-10-05 | End: 2023-10-05 | Stop reason: SDUPTHER

## 2023-10-05 RX ORDER — MIDAZOLAM HYDROCHLORIDE 2 MG/2ML
2 INJECTION, SOLUTION INTRAMUSCULAR; INTRAVENOUS
Status: DISCONTINUED | OUTPATIENT
Start: 2023-10-05 | End: 2023-10-05 | Stop reason: HOSPADM

## 2023-10-05 RX ORDER — FENTANYL CITRATE 50 UG/ML
100 INJECTION, SOLUTION INTRAMUSCULAR; INTRAVENOUS
Status: DISCONTINUED | OUTPATIENT
Start: 2023-10-05 | End: 2023-10-05 | Stop reason: HOSPADM

## 2023-10-05 RX ORDER — SULFAMETHOXAZOLE AND TRIMETHOPRIM 800; 160 MG/1; MG/1
1 TABLET ORAL 2 TIMES DAILY
Qty: 14 TABLET | Refills: 0 | Status: SHIPPED | OUTPATIENT
Start: 2023-10-05 | End: 2023-10-12

## 2023-10-05 RX ORDER — FENTANYL CITRATE 50 UG/ML
INJECTION, SOLUTION INTRAMUSCULAR; INTRAVENOUS PRN
Status: DISCONTINUED | OUTPATIENT
Start: 2023-10-05 | End: 2023-10-05 | Stop reason: SDUPTHER

## 2023-10-05 RX ORDER — FLUCONAZOLE 150 MG/1
150 TABLET ORAL DAILY
Qty: 3 TABLET | Refills: 0 | Status: SHIPPED | OUTPATIENT
Start: 2023-10-05 | End: 2023-10-08

## 2023-10-05 RX ORDER — OXYCODONE HYDROCHLORIDE AND ACETAMINOPHEN 5; 325 MG/1; MG/1
1 TABLET ORAL EVERY 6 HOURS PRN
Qty: 20 TABLET | Refills: 0 | Status: SHIPPED | OUTPATIENT
Start: 2023-10-05 | End: 2023-10-10

## 2023-10-05 RX ORDER — EPHEDRINE SULFATE/0.9% NACL/PF 50 MG/5 ML
SYRINGE (ML) INTRAVENOUS PRN
Status: DISCONTINUED | OUTPATIENT
Start: 2023-10-05 | End: 2023-10-05 | Stop reason: SDUPTHER

## 2023-10-05 RX ORDER — SODIUM CHLORIDE 9 MG/ML
INJECTION, SOLUTION INTRAVENOUS PRN
Status: DISCONTINUED | OUTPATIENT
Start: 2023-10-05 | End: 2023-10-05 | Stop reason: HOSPADM

## 2023-10-05 RX ORDER — LIDOCAINE HYDROCHLORIDE 20 MG/ML
INJECTION, SOLUTION EPIDURAL; INFILTRATION; INTRACAUDAL; PERINEURAL PRN
Status: DISCONTINUED | OUTPATIENT
Start: 2023-10-05 | End: 2023-10-05 | Stop reason: SDUPTHER

## 2023-10-05 RX ORDER — SODIUM CHLORIDE 0.9 % (FLUSH) 0.9 %
5-40 SYRINGE (ML) INJECTION PRN
Status: DISCONTINUED | OUTPATIENT
Start: 2023-10-05 | End: 2023-10-05 | Stop reason: HOSPADM

## 2023-10-05 RX ORDER — LIDOCAINE HYDROCHLORIDE 10 MG/ML
1 INJECTION, SOLUTION INFILTRATION; PERINEURAL
Status: DISCONTINUED | OUTPATIENT
Start: 2023-10-05 | End: 2023-10-05 | Stop reason: HOSPADM

## 2023-10-05 RX ORDER — ACETAMINOPHEN 500 MG
1000 TABLET ORAL ONCE
Status: COMPLETED | OUTPATIENT
Start: 2023-10-05 | End: 2023-10-05

## 2023-10-05 RX ORDER — PROPOFOL 10 MG/ML
INJECTION, EMULSION INTRAVENOUS PRN
Status: DISCONTINUED | OUTPATIENT
Start: 2023-10-05 | End: 2023-10-05 | Stop reason: SDUPTHER

## 2023-10-05 RX ADMIN — ONDANSETRON 4 MG: 2 INJECTION INTRAMUSCULAR; INTRAVENOUS at 17:49

## 2023-10-05 RX ADMIN — Medication 2000 MG: at 17:45

## 2023-10-05 RX ADMIN — ROCURONIUM BROMIDE 5 MG: 10 INJECTION, SOLUTION INTRAVENOUS at 17:38

## 2023-10-05 RX ADMIN — PROPOFOL 140 MG: 10 INJECTION, EMULSION INTRAVENOUS at 17:38

## 2023-10-05 RX ADMIN — PHENYLEPHRINE HYDROCHLORIDE 100 MCG: 0.1 INJECTION, SOLUTION INTRAVENOUS at 17:58

## 2023-10-05 RX ADMIN — ACETAMINOPHEN 1000 MG: 500 TABLET, FILM COATED ORAL at 15:34

## 2023-10-05 RX ADMIN — Medication 110 MG: at 17:38

## 2023-10-05 RX ADMIN — SODIUM CHLORIDE, POTASSIUM CHLORIDE, SODIUM LACTATE AND CALCIUM CHLORIDE: 600; 310; 30; 20 INJECTION, SOLUTION INTRAVENOUS at 15:30

## 2023-10-05 RX ADMIN — DEXAMETHASONE SODIUM PHOSPHATE 4 MG: 10 INJECTION INTRAMUSCULAR; INTRAVENOUS at 17:49

## 2023-10-05 RX ADMIN — LIDOCAINE HYDROCHLORIDE 80 MG: 20 INJECTION, SOLUTION EPIDURAL; INFILTRATION; INTRACAUDAL; PERINEURAL at 17:38

## 2023-10-05 RX ADMIN — Medication 5 MG: at 17:58

## 2023-10-05 RX ADMIN — Medication 10 MG: at 17:48

## 2023-10-05 RX ADMIN — FENTANYL CITRATE 100 MCG: 50 INJECTION, SOLUTION INTRAMUSCULAR; INTRAVENOUS at 17:38

## 2023-10-05 RX ADMIN — PHENYLEPHRINE HYDROCHLORIDE 100 MCG: 0.1 INJECTION, SOLUTION INTRAVENOUS at 17:48

## 2023-10-05 ASSESSMENT — PAIN DESCRIPTION - PAIN TYPE: TYPE: SURGICAL PAIN

## 2023-10-05 ASSESSMENT — PAIN DESCRIPTION - LOCATION: LOCATION: ABDOMEN

## 2023-10-05 ASSESSMENT — PAIN SCALES - GENERAL: PAINLEVEL_OUTOF10: 2

## 2023-10-05 ASSESSMENT — PAIN DESCRIPTION - ORIENTATION: ORIENTATION: LOWER

## 2023-10-05 ASSESSMENT — PAIN - FUNCTIONAL ASSESSMENT: PAIN_FUNCTIONAL_ASSESSMENT: 0-10

## 2023-10-05 NOTE — ANESTHESIA PROCEDURE NOTES
Airway  Date/Time: 10/5/2023 6:40 PM  Urgency: elective    Airway not difficult    General Information and Staff    Patient location during procedure: OR  Resident/CRNA: CHELSEY Moseley CRNA  Performed: resident/CRNA   Performed by: CHELSEY Moseley CRNA  Authorized by: Marty Denney MD      Indications and Patient Condition  Indications for airway management: anesthesia  Spontaneous Ventilation: absent  Sedation level: deep  Preoxygenated: yes  Patient position: sniffing  MILS not maintained throughout  Mask difficulty assessment: vent by bag mask    Final Airway Details  Final airway type: endotracheal airway      Successful airway: ETT  Cuffed: yes   Successful intubation technique: direct laryngoscopy  Facilitating devices/methods: intubating stylet  Endotracheal tube insertion site: oral  Blade: Love  Blade size: #3  ETT size (mm): 7.0  Cormack-Lehane Classification: grade I - full view of glottis  Placement verified by: chest auscultation and capnometry   Measured from: lips  ETT to lips (cm): 22  Number of attempts at approach: 1  Ventilation between attempts: bag mask  Number of other approaches attempted: 0    no

## 2023-10-05 NOTE — ANESTHESIA PRE PROCEDURE
Department of Anesthesiology  Preprocedure Note       Name:  Jamila Devries   Age:  59 y.o.  :  1960                                          MRN:  [id number]         Date:  10/5/2023      Surgeon: Susan Goodman): Jersey Chen MD    Procedure: Procedure(s):  CYSTOSCOPY, LEFT  URETEROSCOPY,  LASER LITHOTRIPSY, LEFT URETERAL STENT EXCHANGE    Medications prior to admission:   Prior to Admission medications    Medication Sig Start Date End Date Taking?  Authorizing Provider   ondansetron (ZOFRAN) 4 MG tablet Take 1 tablet by mouth every 6 hours as needed for Nausea or Vomiting 23   CHELSEY Bruce - CNP   fluconazole (DIFLUCAN) 200 MG tablet Take 2 tablets by mouth daily for 14 days 9/24/23 10/8/23  Vanessa Mchugh MD   [facility]) 0.4 MG capsule Take 1 capsule by mouth daily  Patient taking differently: Take 1 capsule by mouth at bedtime 23   Meseret Hair APRN - CNP   Cranberry 600 MG TABS daily 23   ProviderArturo MD   D-Mannose POWD daily 23   ProviderArturo MD   Liraglutide (VICTOZA) 18 MG/3ML SOPN SC injection Inject 1.2 mg into the skin daily For 7 days then increase to 1.8 mg daily  DX: E11.65  HbA1c: 9.0  Patient taking differently: Inject 1.2 mg into the skin at bedtime For 7 days then increase to 1.8 mg daily  DX: E11.65  HbA1c: 9.0, takes daily, last dose 10/2/203 7/24/23   Claudeen Musty, MD   Insulin Pen Needle 32G X 6 MM MISC Use with Victoza daily 23   Claudeen Musty, MD   Continuous Blood Gluc Sensor (FREESTYLE ADELA 2 SENSOR) MISC Use to check blood sugars continuously  DX: E11.65  HbA1c 9.0  Patient not taking: Reported on 10/3/2023 7/24/23   Claudeen Musty, MD   Continuous Blood Gluc  (FREESTYLE ADELA 2 READER) ANALIA Use to check blood sugars continuously  DX: E11.65  HbA1c 9.0 23   Claudeen Musty, MD   ezetimibe (ZETIA) 10 MG tablet Take 1 tablet by mouth daily 23

## 2023-10-05 NOTE — H&P
mellitus without complication ([address]) 5665    UTI (urinary tract infection) 2005    Chronic/recurrent       Past Surgical History:  Past Surgical History:   Procedure Laterality Date    BREAST LUMPECTOMY Right 2009    BREAST RECONSTRUCTION  2020    BREAST RECONSTRUCTION  03/23/2021    BSHSI PB GASTRIC BYPASS SP  2017    Lap band    CERVICAL LAMINECTOMY  2016    C2 to C7     COSMETIC SURGERY      Breast reconstruction    CYSTOSCOPY  2017    In office    CYSTOSCOPY Left 09/17/2023    CYSTOSCOPY Left URETERAL STENT INSERTION performed by Mckinley Alvarado MD at [address] [place] [address]  2008 2017    Bilateral tear duct    LUMBAR DISCECTOMY  2008    L4/L5    MASTECTOMY, BILATERAL  2020    OTHER SURGICAL HISTORY Bilateral 2008    bilateral tear ducts - 2008 and 2018     TONSILLECTOMY      Childhood       Medications:  No current facility-administered medications on file prior to encounter.      Current Outpatient Medications on File Prior to Encounter   Medication Sig Dispense Refill    fluconazole (DIFLUCAN) 200 MG tablet Take 2 tablets by mouth daily for 14 days 28 tablet 0    tamsulosin (FLOMAX) 0.4 MG capsule Take 1 capsule by mouth daily (Patient taking differently: Take 1 capsule by mouth at bedtime) 30 capsule 1    Cranberry 600 MG TABS daily      D-Mannose POWD daily      Liraglutide (VICTOZA) 18 MG/3ML SOPN SC injection Inject 1.2 mg into the skin daily For 7 days then increase to 1.8 mg daily  DX: E11.65  HbA1c: 9.0 (Patient taking differently: Inject 1.2 mg into the skin at bedtime For 7 days then increase to 1.8 mg daily  DX: E11.65  HbA1c: 9.0, takes daily, last dose 10/2/203) 3 mL 5    Insulin Pen Needle 32G X 6 MM MISC Use with Victoza daily 100 each 5    Continuous Blood Gluc Sensor (FREESTYLE ADELA 2 SENSOR) MISC Use to check blood sugars continuously  DX: E11.65  HbA1c 9.0 (Patient not taking: Reported on 10/3/2023) 2 each 5    Continuous Blood Gluc  (FREESTYLE ADELA 2 READER) ANALIA Use to check

## 2023-10-05 NOTE — BRIEF OP NOTE
Brief Postoperative Note      Patient: Jayson Cohen  YOB: 1960  MRN: [id number]    Date of Procedure: 10/5/2023    Pre-Op Diagnosis Codes:     * Kidney stone [N20.0]    Post-Op Diagnosis: Same       Procedure(s):  CYSTOSCOPY, LEFT  URETEROSCOPY,  LASER LITHOTRIPSY, LEFT URETERAL STENT EXCHANGE, Basket Stone Extraction    Surgeon(s): Pallavi Sparks MD    Assistant:  * No surgical staff found *    Anesthesia: General    Estimated Blood Loss (mL): Minimal    Complications: None    Specimens:   * No specimens in log *    Implants:  Implant Name Type Inv. Item Serial No.  Lot No. LRB No. Used Action   [employer] 6FR L24CM HYDR+ GRAD CIRCUMFERENTIAL MRK LO PROF - [id number]  STENT URET 6FR L24CM HYDR+ GRAD CIRCUMFERENTIAL MRK LO PROF  [facility]- [id number] Left 1 Implanted         Drains: * No LDAs found *    Findings: 6 mm distal L ureter stone fragmented and removed.        Electronically signed by Pallavi Sparks MD on 10/5/2023 at 6:21 PM

## 2023-10-05 NOTE — DISCHARGE INSTRUCTIONS
If you have had surgery in the past 7-10 days by one of our providers and are having fever, bleeding, or drainage from an incision, have an opening in an incision, or having issues urinating properly, please call [phone]. Ureteral Stent Placement: What to Expect at [address]  A ureteral (say \"you-REE-ter-ul\") stent is a thin, hollow tube that is placed in the ureter to help urine pass from the kidney into the bladder. Ureters are the tubes that connect the kidneys to the bladder. You may have a small amount of blood in your urine for 1 to 3 days after the procedure. While the stent is in place, you may have to urinate more often, feel a sudden need to urinate, or feel like you cannot completely empty your bladder. You may feel some pain when you urinate or do strenuous activity. You also may notice a small amount of blood in your urine after strenuous activities. These side effects usually do not prevent people from doing their normal daily activities. You may have a string attached to your stent. Do not to pull the string unless the doctor tells you to. The doctor will use the string to pull out the stent when you no longer need it. After the procedure, urine may flow better from your kidneys to your bladder. A ureteral stent may be left in place for several days or for as long as several months. Your doctor will take it out when you no longer need it. Cystoscopy: What to Expect at [address]  A cystoscopy is a procedure that lets a doctor look inside of the bladder and the urethra. The urethra is the tube that carries urine from the bladder to outside the body. The doctor uses a thin, lighted tube called a cystoscope to look for kidney or bladder stones, tumors, bleeding, or infection. After the cystoscopy, your urethra may be sore at first, and it may burn when you urinate for the first few days after the procedure.  You may feel the need to urinate more often, and your urine may

## 2023-10-05 NOTE — ANESTHESIA POSTPROCEDURE EVALUATION
Department of Anesthesiology  Postprocedure Note    Patient: Rudolph Vargas  MRN: [id number]  YOB: 1960  Date of evaluation: 10/5/2023      Procedure Summary     Date: 10/05/23 Room / Location: [facility] MAIN OR 01 CYSTO / [facility] MAIN OR    Anesthesia Start: 1366 Anesthesia Stop: 3293    Procedure: CYSTOSCOPY, LEFT  URETEROSCOPY,  LASER LITHOTRIPSY, LEFT URETERAL STENT EXCHANGE (Left: Urethra) Diagnosis:       Kidney stone      (Kidney stone [N20.0])    Providers:  Jean Rivas MD Responsible Provider: Kika Mcnamara MD    Anesthesia Type: General ASA Status: 3          Anesthesia Type: General    Anoop Phase I: Anoop Score: 5    Anoop Phase II:        Anesthesia Post Evaluation    Patient location during evaluation: PACU  Patient participation: complete - patient participated  Level of consciousness: awake and alert  Pain score: 1  Airway patency: patent  Nausea & Vomiting: no nausea  Complications: no  Cardiovascular status: blood pressure returned to baseline and hemodynamically stable  Respiratory status: acceptable  Hydration status: euvolemic  Multimodal analgesia pain management approach  Pain management: adequate and satisfactory to patient

## 2023-10-06 NOTE — OP NOTE
a sensor wire alongside the stent under fluoroscopic guidance in the left renal pelvis. Once the sensor wire was in position, I backloaded my scope off the wire leaving the wire in place as a guide. I then reinserted my scope with flexible stent grasper. I grasped the stent and removed it completely intact. I then switched to pressure bag and my semi-rigid ureteroscope, inserted this under direct vision alongside the wire into the distal left ureter. Just inside the left ureter, I encountered the 6 mm stone. I used to 365 micron laser fiber with the settings of 0.8 joules and 8 Hz. I lasered the stone into basketable pieces. I used the [employer] basket to grab the pieces and removed them depositing them all into the bladder. At this point, once the stone was removed, I inspected the entire course of the ureter from kidney down to bladder. It was free of any injury and there was no evidence of any stone in the more proximal ureter. At this point, I removed the ureteroscope and I used the orange pusher to load the wire onto the rigid cystoscope. I passed a 6 x 24 double-J left ureter stent with strings over the wire under fluoroscopic guidance in the left renal pelvis. Once the stent was in position, I pulled the wire noting good curl in the left renal pelvis as well as the bladder. I then drained the bladder completely. I left strings extruding from the meatus and cut them to size. I attempted to find the stone fragments and sent for analysis, but they were too small to send and basically, disintegrated. The patient was then awoken from anesthesia, transferred to the PACU in stable condition. She tolerated The procedure well. There were no complications. All counts were correct at the end of the procedure.         MD JOSEY [id number]  D:  10/05/2023 18:31  T:  10/06/2023 2:15  JOB #:  [id number]

## 2023-10-06 NOTE — PROGRESS NOTES
Physician Progress Note      Lenore Lahtam  CSN #:                  [id number]  :                       1960  ADMIT DATE:       2023 10:29 PM  [address] [place] [address] DATE:        2023 12:49 PM  RESPONDING  PROVIDER #:        Lina Jonas MD          QUERY TEXT:    Pt admitted with ureteral stone with hydronephrosis. Pt noted to have severe   sepsis on H&P but not otherwise noted. If possible, please document in the   progress notes and discharge summary if sepsis confirmed present on admission   or ruled out: The medical record reflects the following:  Risk Factors: Pyelonephritis, fungemia, TARA  Clinical Indicators: Noted on H&P with severe sepsis but sepsis not mentioned   on progress notes. Admitted with UTI and fungal bacteremia, TARA, temp of   102.9, and tachycardic to 120's. Treatment: ID consult, Merrem, Eraxis, cultures, Vanc and Zosyn  Options provided:  -- Severe sepsis, present on admission  -- Other - I will add my own diagnosis  -- Disagree - Not applicable / Not valid  -- Disagree - Clinically unable to determine / Unknown  -- Refer to Clinical Documentation Reviewer    PROVIDER RESPONSE TEXT:    This patient has severe sepsis which was present on admission. Query created by:  Nancy Young on 10/6/2023 10:23 AM      Electronically signed by:  Lina Jonas MD 10/6/2023 11:54 AM

## 2023-10-12 ENCOUNTER — OFFICE VISIT (OUTPATIENT)
Dept: UROLOGY | Age: 63
End: 2023-10-12
Payer: COMMERCIAL

## 2023-10-12 DIAGNOSIS — N28.1 RENAL CYST, LEFT: ICD-10-CM

## 2023-10-12 DIAGNOSIS — N20.1 LEFT URETERAL STONE: Primary | ICD-10-CM

## 2023-10-12 DIAGNOSIS — N20.0 KIDNEY STONE: ICD-10-CM

## 2023-10-12 DIAGNOSIS — N39.0 RECURRENT UTI: ICD-10-CM

## 2023-10-12 LAB
BILIRUBIN, URINE, POC: NORMAL
BLOOD URINE, POC: NORMAL
GLUCOSE URINE, POC: NEGATIVE
KETONES, URINE, POC: NEGATIVE
LEUKOCYTE ESTERASE, URINE, POC: NORMAL
NITRITE, URINE, POC: NEGATIVE
PH, URINE, POC: 5.5 (ref 4.6–8)
PROTEIN,URINE, POC: 300
SPECIFIC GRAVITY, URINE, POC: 1.03 (ref 1–1.03)
URINALYSIS CLARITY, POC: NORMAL
URINALYSIS COLOR, POC: NORMAL
UROBILINOGEN, POC: NORMAL

## 2023-10-12 PROCEDURE — 81003 URINALYSIS AUTO W/O SCOPE: CPT | Performed by: NURSE PRACTITIONER

## 2023-10-12 PROCEDURE — 99214 OFFICE O/P EST MOD 30 MIN: CPT | Performed by: NURSE PRACTITIONER

## 2023-10-12 PROCEDURE — 74018 RADEX ABDOMEN 1 VIEW: CPT | Performed by: NURSE PRACTITIONER

## 2023-10-12 ASSESSMENT — ENCOUNTER SYMPTOMS: BACK PAIN: 0

## 2023-10-12 NOTE — PROGRESS NOTES
[facility]  [address]  [phone]          April Burger  : 1960    Chief Complaint   Patient presents with    Follow-up     Stent discussin          BLANK     April Burger is a 59 y.o. female  w hx of DM2 and breast CA followed for kidney stones and recurrent UTI. She had 2 positive E. Coli urine cultures in Aug and 2022. She reports UTI for many yrs. Sx include UU and UF. She saw a urologist in [place]. Reports she had a cysto approx 4-5 yrs ago that showed \"inflammation\". Has tried D-mannose and estrogen cream wo success. Has diarrhea w metformin. Often gets yeast infections w antibiotics. Denies urine leakage or pad use. Urine culture 10/20/22 revealed klebsiella. Tx w keflex and keflex suppression started. MARIAA Oct 2022 revealed a indeterminate left renal cyst.  Klebsiella UTI in  despite antibiotic suppression. Cystoscopy in  by Dr. Veornica Yarbrough showed incomplete bladder emptying w large bladder capacity. CT A/P hematuria protocol showed non obstructing left renal stones. No longer on estrogen cream due to hx of breast CA. No further UTI since . She cont w keflex. Began to have gross hematuria and left flank pain. Seen on 23. Sx better on day of visit. Afebrile. UA shows no infection/moderate blood. KUB in office today reviewed by myself and shows no stone. She is trying to wean keflex suppression. Offered CT however she opted to take flomax and monitor sx. Went to ER on 23 w fever with the highest at 102. She states that her left upper quadrant pain seems to radiate into her left flank. Work-up in the ER revealed an obstructing 6mm left distal ureteral stone with hydronephrosis. Underwent stent placement on 23 by Dr. Michael Rod. Urine culture saccharomyces cerevisiae. BCX from 9/16 x2 with saccharomyces cerevisiase. ID consulted. Repeat BCX  negative. Pt on eraxis.  ID suspects

## 2023-10-13 ENCOUNTER — CARE COORDINATION (OUTPATIENT)
Dept: CARE COORDINATION | Facility: CLINIC | Age: 63
End: 2023-10-13

## 2023-10-16 NOTE — CARE COORDINATION
Late entry for 10/13/2023        Care Transitions Follow Up Call    Patient Current Location:  Home: [address] [place] [address] [address] [address]    Care Transition Nurse contacted the patient by telephone to follow up after admission on 2023. Verified name and  with patient as identifiers. Patient: Arlet Leiva  Patient : 1960   MRN: [id number]  Reason for Admission: Sepsis  Discharge Date: 10/5/23 RARS: Readmission Risk Score: 8.7      Needs to be reviewed by the provider   Additional needs identified to be addressed with provider: No  none             Method of communication with provider: none. Patient reports that she is doing well. Long history of UTI's. Other history includes: DM type 2, HTN. 10/5/2023 completed outpatient procedure: CYSTOSCOPY, LEFT  URETEROSCOPY,  LASER LITHOTRIPSY, LEFT URETERAL STENT EXCHANGE  Follow up appointment 10/12/2023, reports good outcome of that follow up appointment. Addressed changes since last contact:   outpatient procedure with follow up appointment. Discussed follow-up appointments. If no appointment was previously scheduled, appointment scheduling offered: Yes. Is follow up appointment scheduled within 7 days of discharge? Yes. Follow Up  Future Appointments   Date Time Provider [address]   10/18/2023  9:00 AM POW [facility] POW [facility] AMB   10/24/2023 11:30 AM Manuela Gonzalez MD POW [facility] AMB   2023 11:30 AM Carola Hair APRN - CNP [id number] [facility] AMB     External follow up appointment(s): none noted    Care Transition Nurse reviewed discharge instructions, medical action plan, and red flags with patient and discussed any barriers to care and/or understanding of plan of care after discharge. Discussed appropriate site of care based on symptoms and resources available to patient including: PCP  Specialist  Urgent care clinics  When to call 911. The patient agrees to contact the PCP office for questions related to their healthcare.

## 2023-10-24 ENCOUNTER — OFFICE VISIT (OUTPATIENT)
Dept: PRIMARY CARE CLINIC | Facility: CLINIC | Age: 63
End: 2023-10-24
Payer: COMMERCIAL

## 2023-10-24 ENCOUNTER — TELEPHONE (OUTPATIENT)
Dept: PRIMARY CARE CLINIC | Facility: CLINIC | Age: 63
End: 2023-10-24

## 2023-10-24 VITALS
SYSTOLIC BLOOD PRESSURE: 115 MMHG | HEIGHT: 64 IN | OXYGEN SATURATION: 97 % | TEMPERATURE: 97.8 F | WEIGHT: 151 LBS | BODY MASS INDEX: 25.78 KG/M2 | DIASTOLIC BLOOD PRESSURE: 58 MMHG | HEART RATE: 81 BPM

## 2023-10-24 DIAGNOSIS — I10 ESSENTIAL HYPERTENSION: ICD-10-CM

## 2023-10-24 DIAGNOSIS — Z23 FLU VACCINE NEED: ICD-10-CM

## 2023-10-24 DIAGNOSIS — E78.1 HIGH TRIGLYCERIDES: ICD-10-CM

## 2023-10-24 DIAGNOSIS — E11.65 TYPE 2 DIABETES MELLITUS WITH HYPERGLYCEMIA, WITHOUT LONG-TERM CURRENT USE OF INSULIN (HCC): Primary | Chronic | ICD-10-CM

## 2023-10-24 DIAGNOSIS — E11.65 TYPE 2 DIABETES MELLITUS WITH HYPERGLYCEMIA, WITHOUT LONG-TERM CURRENT USE OF INSULIN (HCC): Chronic | ICD-10-CM

## 2023-10-24 DIAGNOSIS — Z79.899 ENCOUNTER FOR LONG-TERM (CURRENT) USE OF MEDICATIONS: ICD-10-CM

## 2023-10-24 DIAGNOSIS — Z87.442 HISTORY OF KIDNEY STONES: ICD-10-CM

## 2023-10-24 DIAGNOSIS — K76.0 FATTY LIVER DETERMINED BY BIOPSY: ICD-10-CM

## 2023-10-24 PROBLEM — N39.0 UTI (URINARY TRACT INFECTION): Status: RESOLVED | Noted: 2023-09-24 | Resolved: 2023-10-24

## 2023-10-24 PROCEDURE — 3074F SYST BP LT 130 MM HG: CPT | Performed by: FAMILY MEDICINE

## 2023-10-24 PROCEDURE — 99213 OFFICE O/P EST LOW 20 MIN: CPT | Performed by: FAMILY MEDICINE

## 2023-10-24 PROCEDURE — 3078F DIAST BP <80 MM HG: CPT | Performed by: FAMILY MEDICINE

## 2023-10-24 PROCEDURE — 90471 IMMUNIZATION ADMIN: CPT | Performed by: FAMILY MEDICINE

## 2023-10-24 PROCEDURE — 90674 CCIIV4 VAC NO PRSV 0.5 ML IM: CPT | Performed by: FAMILY MEDICINE

## 2023-10-24 PROCEDURE — 3051F HG A1C>EQUAL 7.0%<8.0%: CPT | Performed by: FAMILY MEDICINE

## 2023-10-24 RX ORDER — LIRAGLUTIDE 6 MG/ML
1.8 INJECTION SUBCUTANEOUS DAILY
Qty: 3 ML | Refills: 5 | Status: SHIPPED | OUTPATIENT
Start: 2023-10-24 | End: 2023-10-26 | Stop reason: SDUPTHER

## 2023-10-24 RX ORDER — LIRAGLUTIDE 6 MG/ML
1.8 INJECTION SUBCUTANEOUS DAILY
Qty: 3 ML | Refills: 5 | Status: SHIPPED | OUTPATIENT
Start: 2023-10-24 | End: 2023-10-24 | Stop reason: SDUPTHER

## 2023-10-24 RX ORDER — CEPHALEXIN 250 MG/1
250 CAPSULE ORAL DAILY
COMMUNITY

## 2023-10-24 NOTE — TELEPHONE ENCOUNTER
Pharmacy called about the victoza pen - it states to inject 1.8mg into skin and then increase in 7 days to 1.8 - [id number] - [phone]

## 2023-10-25 ENCOUNTER — HOSPITAL ENCOUNTER (OUTPATIENT)
Dept: ULTRASOUND IMAGING | Age: 63
Discharge: HOME | End: 2023-10-28
Payer: COMMERCIAL

## 2023-10-25 ENCOUNTER — PATIENT MESSAGE (OUTPATIENT)
Dept: PRIMARY CARE CLINIC | Facility: CLINIC | Age: 63
End: 2023-10-25

## 2023-10-25 DIAGNOSIS — E11.65 TYPE 2 DIABETES MELLITUS WITH HYPERGLYCEMIA, WITHOUT LONG-TERM CURRENT USE OF INSULIN (HCC): Chronic | ICD-10-CM

## 2023-10-25 DIAGNOSIS — N39.0 RECURRENT UTI: ICD-10-CM

## 2023-10-25 DIAGNOSIS — N20.1 LEFT URETERAL STONE: ICD-10-CM

## 2023-10-25 DIAGNOSIS — N28.1 RENAL CYST, LEFT: ICD-10-CM

## 2023-10-25 DIAGNOSIS — N20.0 KIDNEY STONE: ICD-10-CM

## 2023-10-25 PROCEDURE — 76770 US EXAM ABDO BACK WALL COMP: CPT

## 2023-10-26 ENCOUNTER — TELEPHONE (OUTPATIENT)
Dept: UROLOGY | Age: 63
End: 2023-10-26

## 2023-10-26 RX ORDER — LIRAGLUTIDE 6 MG/ML
1.8 INJECTION SUBCUTANEOUS DAILY
Qty: 3 ADJUSTABLE DOSE PRE-FILLED PEN SYRINGE | Refills: 5 | Status: SHIPPED | OUTPATIENT
Start: 2023-10-26

## 2023-10-26 NOTE — TELEPHONE ENCOUNTER
----- Message from CHELSEY Mullen CNP sent at 10/26/2023  1:10 PM EDT -----    LDVM/[facility]  MARIAA shows healing after surgery. Benign cysts on both kidneys as well as kidney stones. Will fu w XR in office. Keep scheduled fu.

## 2023-10-26 NOTE — TELEPHONE ENCOUNTER
From: Chandra Mccall  To: Dr. Luiza Nam: 10/25/2023 2:58 PM EDT  Subject: Bktoza    Picked up my victoza script today. It was for the 2 pen pack which only covers 20 days at 1.8 mg/ day. Could you please change to the 3 pen pack so my $60 copay will cover 30 days? Thank you so much.    Shannen Bro

## 2023-10-27 ENCOUNTER — CARE COORDINATION (OUTPATIENT)
Dept: CARE COORDINATION | Facility: CLINIC | Age: 63
End: 2023-10-27

## 2023-10-27 NOTE — CARE COORDINATION
Patient has graduated from the Care Transitions program on 10/27/2023. Patient/family has the ability to self-manage at this time. Patient has no further care management needs, no referral to the [facility] team for further management. Patient has Care Transition Nurse's contact information for any further questions, concerns, or needs. Patients upcoming visits:    Future Appointments   Date Time Provider [address]   12/1/2023 11:00 AM Susan Hair, APRN - CNP [id number] [facility] AMB           Patient is able to self manage at this time. Has upcoming follow up appointments scheduled. Program closed with goals met.

## 2023-12-01 ENCOUNTER — OFFICE VISIT (OUTPATIENT)
Dept: UROLOGY | Age: 63
End: 2023-12-01
Payer: COMMERCIAL

## 2023-12-01 DIAGNOSIS — N20.0 KIDNEY STONE: Primary | ICD-10-CM

## 2023-12-01 DIAGNOSIS — B37.31 YEAST VAGINITIS: ICD-10-CM

## 2023-12-01 DIAGNOSIS — N39.0 RECURRENT UTI: ICD-10-CM

## 2023-12-01 LAB
BILIRUBIN, URINE, POC: NORMAL
BLOOD URINE, POC: NEGATIVE
GLUCOSE URINE, POC: 100
KETONES, URINE, POC: NEGATIVE
LEUKOCYTE ESTERASE, URINE, POC: NEGATIVE
NITRITE, URINE, POC: NEGATIVE
PH, URINE, POC: 5.5 (ref 4.6–8)
PROTEIN,URINE, POC: NORMAL
SPECIFIC GRAVITY, URINE, POC: 1.03 (ref 1–1.03)
URINALYSIS CLARITY, POC: NORMAL
URINALYSIS COLOR, POC: NORMAL
UROBILINOGEN, POC: NORMAL

## 2023-12-01 PROCEDURE — 99214 OFFICE O/P EST MOD 30 MIN: CPT | Performed by: NURSE PRACTITIONER

## 2023-12-01 PROCEDURE — 81003 URINALYSIS AUTO W/O SCOPE: CPT | Performed by: NURSE PRACTITIONER

## 2023-12-01 PROCEDURE — 74018 RADEX ABDOMEN 1 VIEW: CPT | Performed by: NURSE PRACTITIONER

## 2023-12-01 RX ORDER — FLUCONAZOLE 150 MG/1
TABLET ORAL
Qty: 2 TABLET | Refills: 1 | Status: SHIPPED | OUTPATIENT
Start: 2023-12-01

## 2023-12-01 RX ORDER — CEPHALEXIN 250 MG/1
250 CAPSULE ORAL DAILY
Qty: 90 CAPSULE | Refills: 1 | Status: SHIPPED | OUTPATIENT
Start: 2023-12-01

## 2023-12-01 ASSESSMENT — ENCOUNTER SYMPTOMS: BACK PAIN: 0

## 2023-12-01 NOTE — PROGRESS NOTES
Negative    Ketones, Urine, POC Negative Negative    Specific Gravity, Urine, POC 1.030 1.001 - 1.035    Blood, Urine, POC Moderate Negative    pH, Urine, POC 5.5 4.6 - 8.0    Protein, Urine, POC Trace Negative    Urobilinogen, POC Normal     Nitrite, Urine, POC Negative Negative    Leukocyte Esterase, Urine, POC Negative Negative       PHYSICAL EXAM    General appearance - well appearing and in no distress  Mental status - alert, oriented to person, place, and time  Neck - supple, no significant adenopathy  Chest/Lung-  Quiet, even and easy respiratory effort without use of accessory muscles  Skin - normal coloration and turgor, no rashes        Assessment and Plan    ICD-10-CM    1. Kidney stone  N20.0 AMB POC URINALYSIS DIP STICK AUTO W/O MICRO     AMB POC XRAY ABDOMEN 1 VIEW      2. Recurrent UTI  N39.0 AMB POC URINALYSIS DIP STICK AUTO W/O MICRO     cephALEXin (KEFLEX) 250 MG capsule      3. Yeast vaginitis  B37.31 fluconazole (DIFLUCAN) 150 MG tablet      KUB findings discussed. She opts to observe w KUB. Stone prevention discussed. Cont keflex suppression. UTI prevention discussed. PRN diflucan. ROV in 6 months. To call sooner if needed. Ti Way is supervising physician today and he approves plan of care.

## 2024-01-03 ENCOUNTER — TELEPHONE (OUTPATIENT)
Dept: PRIMARY CARE CLINIC | Facility: CLINIC | Age: 64
End: 2024-01-03

## 2024-01-03 DIAGNOSIS — E78.2 MIXED HYPERLIPIDEMIA: ICD-10-CM

## 2024-01-03 DIAGNOSIS — I10 ESSENTIAL HYPERTENSION: ICD-10-CM

## 2024-01-03 DIAGNOSIS — E11.9 CONTROLLED TYPE 2 DIABETES MELLITUS WITHOUT COMPLICATION, WITHOUT LONG-TERM CURRENT USE OF INSULIN (HCC): ICD-10-CM

## 2024-01-03 RX ORDER — EZETIMIBE 10 MG/1
10 TABLET ORAL DAILY
Qty: 90 TABLET | Refills: 3 | Status: SHIPPED | OUTPATIENT
Start: 2024-01-03

## 2024-01-03 RX ORDER — METOPROLOL SUCCINATE 50 MG/1
50 TABLET, EXTENDED RELEASE ORAL DAILY
Qty: 90 TABLET | Refills: 0 | OUTPATIENT
Start: 2024-01-03

## 2024-01-03 RX ORDER — METOPROLOL SUCCINATE 50 MG/1
50 TABLET, EXTENDED RELEASE ORAL DAILY
Qty: 90 TABLET | Refills: 3 | Status: SHIPPED | OUTPATIENT
Start: 2024-01-03

## 2024-01-03 NOTE — TELEPHONE ENCOUNTER
Request Reference Number: [id number]. VICTOZA INJ 18MG/3ML is approved through 01/03/2025. Your patient may now fill this prescription and it will be covered.    conducted a detailed discussion... I had a detailed discussion with the patient and/or guardian regarding the historical points, exam findings, and any diagnostic results supporting the discharge/admit diagnosis.

## 2024-02-27 ENCOUNTER — OFFICE VISIT (OUTPATIENT)
Dept: UROLOGY | Age: 64
End: 2024-02-27
Payer: COMMERCIAL

## 2024-02-27 ENCOUNTER — TELEPHONE (OUTPATIENT)
Dept: UROLOGY | Age: 64
End: 2024-02-27

## 2024-02-27 DIAGNOSIS — N39.0 RECURRENT UTI: Primary | ICD-10-CM

## 2024-02-27 DIAGNOSIS — N39.0 RECURRENT UTI: ICD-10-CM

## 2024-02-27 LAB
BILIRUBIN, URINE, POC: NEGATIVE
BLOOD URINE, POC: NORMAL
GLUCOSE URINE, POC: 500
KETONES, URINE, POC: NEGATIVE
LEUKOCYTE ESTERASE, URINE, POC: NORMAL
NITRITE, URINE, POC: POSITIVE
PH, URINE, POC: 5.5 (ref 4.6–8)
PROTEIN,URINE, POC: NORMAL
SPECIFIC GRAVITY, URINE, POC: 1.03 (ref 1–1.03)
URINALYSIS CLARITY, POC: NORMAL
URINALYSIS COLOR, POC: NORMAL
UROBILINOGEN, POC: NORMAL

## 2024-02-27 PROCEDURE — 81003 URINALYSIS AUTO W/O SCOPE: CPT | Performed by: NURSE PRACTITIONER

## 2024-02-27 RX ORDER — SULFAMETHOXAZOLE AND TRIMETHOPRIM 800; 160 MG/1; MG/1
1 TABLET ORAL 2 TIMES DAILY
Qty: 14 TABLET | Refills: 0 | Status: SHIPPED | OUTPATIENT
Start: 2024-02-27 | End: 2024-02-29 | Stop reason: ALTCHOICE

## 2024-02-27 NOTE — PROGRESS NOTES
SX:Dysuria, Freq burning  Results for orders placed or performed in visit on 02/27/24   AMB POC URINALYSIS DIP STICK AUTO W/O MICRO   Result Value Ref Range    Color (UA POC)      Clarity (UA POC)      Glucose, Urine,      Bilirubin, Urine, POC Negative     KETONES, Urine, POC Negative     Specific Gravity, Urine, POC 1.030 1.001 - 1.035    Blood (UA POC) Small     pH, Urine, POC 5.5 4.6 - 8.0    Protein, Urine, POC Trace     Urobilinogen, POC 0.2 mg/dL     Nitrite, Urine, POC Positive     Leukocyte Esterase, Urine, POC Small    Start bactrim. Hold keflex suppression while on Bactrim. Send culture. Will call results.   Marge Hair, APRN - CNP

## 2024-02-29 LAB
BACTERIA SPEC CULT: ABNORMAL
BACTERIA SPEC CULT: ABNORMAL
SERVICE CMNT-IMP: ABNORMAL

## 2024-02-29 RX ORDER — CIPROFLOXACIN 500 MG/1
500 TABLET, FILM COATED ORAL 2 TIMES DAILY
Qty: 14 TABLET | Refills: 0 | Status: SHIPPED | OUTPATIENT
Start: 2024-02-29 | End: 2024-03-07

## 2024-04-09 DIAGNOSIS — E11.65 TYPE 2 DIABETES MELLITUS WITH HYPERGLYCEMIA, WITHOUT LONG-TERM CURRENT USE OF INSULIN (HCC): Chronic | ICD-10-CM

## 2024-04-10 RX ORDER — LIRAGLUTIDE 6 MG/ML
1.8 INJECTION SUBCUTANEOUS DAILY
Qty: 3 ADJUSTABLE DOSE PRE-FILLED PEN SYRINGE | Refills: 5 | Status: SHIPPED | OUTPATIENT
Start: 2024-04-10 | End: 2024-04-11 | Stop reason: ALTCHOICE

## 2024-04-10 ASSESSMENT — PATIENT HEALTH QUESTIONNAIRE - PHQ9
2. FEELING DOWN, DEPRESSED OR HOPELESS: MORE THAN HALF THE DAYS
SUM OF ALL RESPONSES TO PHQ QUESTIONS 1-9: 2
SUM OF ALL RESPONSES TO PHQ9 QUESTIONS 1 & 2: 2
1. LITTLE INTEREST OR PLEASURE IN DOING THINGS: NOT AT ALL
1. LITTLE INTEREST OR PLEASURE IN DOING THINGS: NOT AT ALL
SUM OF ALL RESPONSES TO PHQ QUESTIONS 1-9: 2
2. FEELING DOWN, DEPRESSED OR HOPELESS: MORE THAN HALF THE DAYS
SUM OF ALL RESPONSES TO PHQ9 QUESTIONS 1 & 2: 2
SUM OF ALL RESPONSES TO PHQ QUESTIONS 1-9: 2
SUM OF ALL RESPONSES TO PHQ QUESTIONS 1-9: 2

## 2024-04-11 ENCOUNTER — OFFICE VISIT (OUTPATIENT)
Dept: PRIMARY CARE CLINIC | Facility: CLINIC | Age: 64
End: 2024-04-11
Payer: COMMERCIAL

## 2024-04-11 ENCOUNTER — TELEPHONE (OUTPATIENT)
Dept: PRIMARY CARE CLINIC | Facility: CLINIC | Age: 64
End: 2024-04-11

## 2024-04-11 VITALS
OXYGEN SATURATION: 96 % | DIASTOLIC BLOOD PRESSURE: 52 MMHG | HEIGHT: 64 IN | SYSTOLIC BLOOD PRESSURE: 138 MMHG | WEIGHT: 162.1 LBS | BODY MASS INDEX: 27.68 KG/M2 | TEMPERATURE: 97.1 F | HEART RATE: 80 BPM

## 2024-04-11 DIAGNOSIS — Z79.899 ENCOUNTER FOR LONG-TERM (CURRENT) USE OF MEDICATIONS: ICD-10-CM

## 2024-04-11 DIAGNOSIS — E78.2 MIXED HYPERLIPIDEMIA: ICD-10-CM

## 2024-04-11 DIAGNOSIS — I10 ESSENTIAL HYPERTENSION: ICD-10-CM

## 2024-04-11 DIAGNOSIS — Z87.891 FORMER SMOKER: ICD-10-CM

## 2024-04-11 DIAGNOSIS — E55.9 VITAMIN D DEFICIENCY: ICD-10-CM

## 2024-04-11 DIAGNOSIS — E11.65 TYPE 2 DIABETES MELLITUS WITH HYPERGLYCEMIA, WITHOUT LONG-TERM CURRENT USE OF INSULIN (HCC): Primary | ICD-10-CM

## 2024-04-11 LAB
ALBUMIN SERPL-MCNC: 3.9 G/DL (ref 3.2–4.6)
ALBUMIN/GLOB SERPL: 0.8 (ref 0.4–1.6)
ALP SERPL-CCNC: 99 U/L (ref 50–136)
ALT SERPL-CCNC: 61 U/L (ref 12–65)
ANION GAP SERPL CALC-SCNC: 7 MMOL/L (ref 2–11)
AST SERPL-CCNC: 57 U/L (ref 15–37)
BASOPHILS # BLD: 0 K/UL (ref 0–0.2)
BASOPHILS NFR BLD: 1 % (ref 0–2)
BILIRUB SERPL-MCNC: 0.4 MG/DL (ref 0.2–1.1)
BUN SERPL-MCNC: 12 MG/DL (ref 8–23)
CALCIUM SERPL-MCNC: 9.8 MG/DL (ref 8.3–10.4)
CHLORIDE SERPL-SCNC: 107 MMOL/L (ref 103–113)
CO2 SERPL-SCNC: 25 MMOL/L (ref 21–32)
CREAT SERPL-MCNC: 0.8 MG/DL (ref 0.6–1)
DIFFERENTIAL METHOD BLD: ABNORMAL
EOSINOPHIL # BLD: 0.1 K/UL (ref 0–0.8)
EOSINOPHIL NFR BLD: 1 % (ref 0.5–7.8)
ERYTHROCYTE [DISTWIDTH] IN BLOOD BY AUTOMATED COUNT: 12.7 % (ref 11.9–14.6)
GLOBULIN SER CALC-MCNC: 4.6 G/DL (ref 2.8–4.5)
GLUCOSE SERPL-MCNC: 229 MG/DL (ref 65–100)
HCT VFR BLD AUTO: 44.1 % (ref 35.8–46.3)
HGB BLD-MCNC: 14.6 G/DL (ref 11.7–15.4)
IMM GRANULOCYTES # BLD AUTO: 0 K/UL (ref 0–0.5)
IMM GRANULOCYTES NFR BLD AUTO: 0 % (ref 0–5)
LYMPHOCYTES # BLD: 3.2 K/UL (ref 0.5–4.6)
LYMPHOCYTES NFR BLD: 50 % (ref 13–44)
MCH RBC QN AUTO: 32.3 PG (ref 26.1–32.9)
MCHC RBC AUTO-ENTMCNC: 33.1 G/DL (ref 31.4–35)
MCV RBC AUTO: 97.6 FL (ref 82–102)
MONOCYTES # BLD: 0.5 K/UL (ref 0.1–1.3)
MONOCYTES NFR BLD: 7 % (ref 4–12)
NEUTS SEG # BLD: 2.6 K/UL (ref 1.7–8.2)
NEUTS SEG NFR BLD: 41 % (ref 43–78)
NRBC # BLD: 0 K/UL (ref 0–0.2)
PLATELET # BLD AUTO: 185 K/UL (ref 150–450)
PMV BLD AUTO: 10.2 FL (ref 9.4–12.3)
POTASSIUM SERPL-SCNC: 4 MMOL/L (ref 3.5–5.1)
PROT SERPL-MCNC: 8.5 G/DL (ref 6.3–8.2)
RBC # BLD AUTO: 4.52 M/UL (ref 4.05–5.2)
SODIUM SERPL-SCNC: 139 MMOL/L (ref 136–146)
WBC # BLD AUTO: 6.4 K/UL (ref 4.3–11.1)

## 2024-04-11 PROCEDURE — 3078F DIAST BP <80 MM HG: CPT | Performed by: FAMILY MEDICINE

## 2024-04-11 PROCEDURE — 3052F HG A1C>EQUAL 8.0%<EQUAL 9.0%: CPT | Performed by: FAMILY MEDICINE

## 2024-04-11 PROCEDURE — 3075F SYST BP GE 130 - 139MM HG: CPT | Performed by: FAMILY MEDICINE

## 2024-04-11 PROCEDURE — 99214 OFFICE O/P EST MOD 30 MIN: CPT | Performed by: FAMILY MEDICINE

## 2024-04-11 RX ORDER — SEMAGLUTIDE 1.34 MG/ML
INJECTION, SOLUTION SUBCUTANEOUS
Qty: 1.5 ML | Refills: 0 | Status: SHIPPED | COMMUNITY
Start: 2024-04-11

## 2024-04-11 RX ORDER — EZETIMIBE 10 MG/1
10 TABLET ORAL DAILY
Qty: 30 TABLET | Refills: 5 | Status: SHIPPED | OUTPATIENT
Start: 2024-04-11

## 2024-04-11 RX ORDER — SEMAGLUTIDE 1.34 MG/ML
1 INJECTION, SOLUTION SUBCUTANEOUS WEEKLY
Qty: 3 ML | Refills: 5 | Status: SHIPPED | OUTPATIENT
Start: 2024-04-11

## 2024-04-11 RX ORDER — DOXEPIN HYDROCHLORIDE 10 MG/1
10 CAPSULE ORAL NIGHTLY
COMMUNITY

## 2024-04-11 SDOH — ECONOMIC STABILITY: FOOD INSECURITY: WITHIN THE PAST 12 MONTHS, THE FOOD YOU BOUGHT JUST DIDN'T LAST AND YOU DIDN'T HAVE MONEY TO GET MORE.: NEVER TRUE

## 2024-04-11 SDOH — ECONOMIC STABILITY: INCOME INSECURITY: HOW HARD IS IT FOR YOU TO PAY FOR THE VERY BASICS LIKE FOOD, HOUSING, MEDICAL CARE, AND HEATING?: SOMEWHAT HARD

## 2024-04-11 SDOH — ECONOMIC STABILITY: FOOD INSECURITY: WITHIN THE PAST 12 MONTHS, YOU WORRIED THAT YOUR FOOD WOULD RUN OUT BEFORE YOU GOT MONEY TO BUY MORE.: NEVER TRUE

## 2024-04-11 NOTE — TELEPHONE ENCOUNTER
Message from Plan  Request Reference Number: [id number]. OZEMPIC INJ 4MG/3ML is approved through 04/11/2025. Your patient may now fill this prescription and it will be covered.. Authorization Expiration Date: April 11, 2025.

## 2024-04-11 NOTE — PROGRESS NOTES
83 >60 ml/min/1.73m2    Calcium 9.8 8.3 - 10.4 MG/DL    Total Bilirubin 0.4 0.2 - 1.1 MG/DL    ALT 61 12 - 65 U/L    AST 57 (H) 15 - 37 U/L    Alk Phosphatase 99 50 - 136 U/L    Total Protein 8.5 (H) 6.3 - 8.2 g/dL    Albumin 3.9 3.2 - 4.6 g/dL    Globulin 4.6 (H) 2.8 - 4.5 g/dL    Albumin/Globulin Ratio 0.8 0.4 - 1.6     Vitamin D 25 Hydroxy   Result Value Ref Range    Vit D, 25-Hydroxy 73.3 30.0 - 100.0 ng/mL   Lipid Panel   Result Value Ref Range    Cholesterol, Total 176 <200 MG/DL    Triglycerides 446 (H) 35 - 150 MG/DL    HDL 45 40 - 60 MG/DL    LDL Calculated Not calculated due to elevated triglyceride level <100 MG/DL    VLDL Cholesterol Calculated 89.2 (H) 6.0 - 23.0 MG/DL    Chol/HDL Ratio 3.9     Hemoglobin A1C   Result Value Ref Range    Hemoglobin A1C 9.0 (H) 4.8 - 5.6 %    Estimated Avg Glucose 212 mg/dL   LDL Cholesterol, Direct   Result Value Ref Range    LDL Direct 93 <100 mg/dl         IMPRESSION/PLAN     Diagnosis Orders   1. Type 2 diabetes mellitus with hyperglycemia, without long-term current use of insulin (HCC)  Semaglutide, 1 MG/DOSE, (OZEMPIC, 1 MG/DOSE,) 4 MG/3ML SOPN    Semaglutide,0.25 or 0.5MG/DOS, (OZEMPIC, 0.25 OR 0.5 MG/DOSE,) 2 MG/1.5ML SOPN    CBC with Auto Differential    Comprehensive Metabolic Panel    Hemoglobin A1C      2. Essential hypertension        3. Mixed hyperlipidemia  ezetimibe (ZETIA) 10 MG tablet    Lipid Panel      4. Former smoker  CT LUNG SCREENING      5. Vitamin D deficiency  Vitamin D 25 Hydroxy      6. Encounter for long-term (current) use of medications            Follow up and Dispositions:  Return in about 4 months (around 8/11/2024).     Patient will continue current medications.  Refilled the above medications.  Will change the following medications: Victoza to Ozempic.   Reviewed medications and side effects in detail.  Will check the above labs.  Reviewed most recent labs.  Reviewed diet, exercise and weight control.  Cardiovascular risks and

## 2024-04-12 LAB
25(OH)D3 SERPL-MCNC: 73.3 NG/ML (ref 30–100)
CHOLEST SERPL-MCNC: 176 MG/DL
EST. AVERAGE GLUCOSE BLD GHB EST-MCNC: 212 MG/DL
HBA1C MFR BLD: 9 % (ref 4.8–5.6)
HDLC SERPL-MCNC: 45 MG/DL (ref 40–60)
HDLC SERPL: 3.9
LDLC SERPL CALC-MCNC: ABNORMAL MG/DL
LDLC SERPL DIRECT ASSAY-MCNC: 93 MG/DL
TRIGL SERPL-MCNC: 446 MG/DL (ref 35–150)
VLDLC SERPL CALC-MCNC: 89.2 MG/DL (ref 6–23)

## 2024-04-22 ENCOUNTER — TELEPHONE (OUTPATIENT)
Dept: UROLOGY | Age: 64
End: 2024-04-22

## 2024-04-22 ENCOUNTER — OFFICE VISIT (OUTPATIENT)
Dept: UROLOGY | Age: 64
End: 2024-04-22
Payer: COMMERCIAL

## 2024-04-22 DIAGNOSIS — N39.0 RECURRENT UTI: Primary | ICD-10-CM

## 2024-04-22 LAB
BILIRUBIN, URINE, POC: NEGATIVE
BLOOD URINE, POC: NORMAL
GLUCOSE URINE, POC: >=1000
KETONES, URINE, POC: NEGATIVE
LEUKOCYTE ESTERASE, URINE, POC: NORMAL
NITRITE, URINE, POC: NEGATIVE
PH, URINE, POC: 6 (ref 4.6–8)
PROTEIN,URINE, POC: NEGATIVE
SPECIFIC GRAVITY, URINE, POC: 1.01 (ref 1–1.03)
URINALYSIS CLARITY, POC: NORMAL
URINALYSIS COLOR, POC: NORMAL
UROBILINOGEN, POC: NORMAL

## 2024-04-22 PROCEDURE — 81003 URINALYSIS AUTO W/O SCOPE: CPT | Performed by: NURSE PRACTITIONER

## 2024-04-22 NOTE — PROGRESS NOTES
Pt SX: freq  Results for orders placed or performed in visit on 04/22/24   AMB POC URINALYSIS DIP STICK AUTO W/O MICRO   Result Value Ref Range    Color (UA POC)      Clarity (UA POC)      Glucose, Urine, POC >=1000     Bilirubin, Urine, POC Negative     KETONES, Urine, POC Negative     Specific Gravity, Urine, POC 1.015 1.001 - 1.035    Blood (UA POC) Trace-intact     pH, Urine, POC 6.0 4.6 - 8.0    Protein, Urine, POC Negative     Urobilinogen, POC 0.2 mg/dL     Nitrite, Urine, POC Negative     Leukocyte Esterase, Urine, POC Small    Urine looks normal. Will send urine culture to ensure no infection. Will call results.   CHELSEY Pathak - CNP

## 2024-04-22 NOTE — TELEPHONE ENCOUNTER
Appointment Request From: Iris Mccall     With Provider: CHELSEY Pathak CNP [[facility]]     Preferred Date Range: 4/22/2024 - 4/22/2024     Preferred Times: Any Time     Reason for visit: Request an Appointment     Comments:  UTI, burning, frequency. Onset friday       Scheduled today 4/22/24 at 1pm. Patient notified via Mychart and phone call.

## 2024-04-23 DIAGNOSIS — N39.0 RECURRENT UTI: ICD-10-CM

## 2024-04-25 LAB
BACTERIA SPEC CULT: NORMAL
BACTERIA SPEC CULT: NORMAL
SERVICE CMNT-IMP: NORMAL

## 2024-06-06 ENCOUNTER — OFFICE VISIT (OUTPATIENT)
Dept: UROLOGY | Age: 64
End: 2024-06-06
Payer: COMMERCIAL

## 2024-06-06 DIAGNOSIS — N20.0 KIDNEY STONE: ICD-10-CM

## 2024-06-06 DIAGNOSIS — N39.0 RECURRENT UTI: Primary | ICD-10-CM

## 2024-06-06 LAB
BILIRUBIN, URINE, POC: NEGATIVE
BLOOD URINE, POC: NEGATIVE
GLUCOSE URINE, POC: NEGATIVE
KETONES, URINE, POC: NEGATIVE
LEUKOCYTE ESTERASE, URINE, POC: NEGATIVE
NITRITE, URINE, POC: NEGATIVE
PH, URINE, POC: 6 (ref 4.6–8)
PROTEIN,URINE, POC: NEGATIVE
SPECIFIC GRAVITY, URINE, POC: 1.03 (ref 1–1.03)
URINALYSIS CLARITY, POC: NORMAL
URINALYSIS COLOR, POC: NORMAL
UROBILINOGEN, POC: NORMAL

## 2024-06-06 PROCEDURE — 99214 OFFICE O/P EST MOD 30 MIN: CPT | Performed by: NURSE PRACTITIONER

## 2024-06-06 PROCEDURE — 81003 URINALYSIS AUTO W/O SCOPE: CPT | Performed by: NURSE PRACTITIONER

## 2024-06-06 RX ORDER — CEPHALEXIN 250 MG/1
250 CAPSULE ORAL DAILY
Qty: 90 CAPSULE | Refills: 3 | Status: SHIPPED | OUTPATIENT
Start: 2024-06-06

## 2024-06-06 NOTE — PROGRESS NOTES
[facility]  [address] [facility]   [address]  [place] [address]  [phone]    Iris Mccall  : 1960    Chief Complaint   Patient presents with    Follow-up    Urinary Tract Infection          HPI     Iris Mccall is a 63 y.o. female w hx of DM2 and breast CA followed for kidney stones and recurrent UTI. She had 2 positive E.Coli urine cultures in Aug and 2022. Saw a urologist in [place]. Reported she cysto approx 4-5 yrs ago that showed \"inflammation\". Has tried D-mannose and estrogen cream wo success.  MARIAA Oct 2022 revealed a indeterminate left renal cyst. Keflex suppression started . Klebsiella UTI in  despite antibiotic suppression. Cystoscopy in  by Dr. Frank showed incomplete bladder emptying w large bladder capacity. CT A/P hematuria protocol showed non obstructing left renal stones. No longer on estrogen cream due to hx of breast CA. No further UTI since . She cont w keflex.      Began to have gross hematuria and left flank pain in . Sx better on day of visit. Afebrile. UA shows no infection/moderate blood. KUB in office reviewed by myself and shows no stone. Offered further eval w CT however she opted to take flomax and monitor sx.      Went to ER on 23 w fever with the highest at 102.  She states that her left upper quadrant pain seems to radiate into her left flank.  Work-up in the ER revealed an obstructing 6mm left distal ureteral stone with hydronephrosis. Underwent stent placement on 23 by Dr. Fatima. Urine culture saccharomyces cerevisiae. BCX from 9/16 x2 with saccharomyces cerevisiase. ID consulted. Repeat BCX  negative.  Pt on eraxis. ID suspects that the stone is the nidus and infection will persist until stone and stent removed. She was dc home on fluconazole per ID recs. Sensitivity came back showing susceptibility to fluconazole.      Back to OR on 10/5/23 for Left URS w stent exchange by

## 2024-08-27 ENCOUNTER — TELEPHONE (OUTPATIENT)
Dept: UROLOGY | Age: 64
End: 2024-08-27

## 2024-08-27 NOTE — TELEPHONE ENCOUNTER
Patient called requesting a urine specimen drop off due to frequency and urgency and I let her know she could come in tomorrow morning due to Marge being out of the office. She declined and stated she would rather go to an urgent care. I apologized & she hung up on me.

## 2024-09-15 DIAGNOSIS — E11.65 TYPE 2 DIABETES MELLITUS WITH HYPERGLYCEMIA, WITHOUT LONG-TERM CURRENT USE OF INSULIN (HCC): ICD-10-CM

## 2024-09-16 RX ORDER — SEMAGLUTIDE 1.34 MG/ML
INJECTION, SOLUTION SUBCUTANEOUS
Qty: 3 ML | Refills: 0 | OUTPATIENT
Start: 2024-09-16

## 2024-09-23 DIAGNOSIS — E11.65 TYPE 2 DIABETES MELLITUS WITH HYPERGLYCEMIA, WITHOUT LONG-TERM CURRENT USE OF INSULIN (HCC): ICD-10-CM

## 2024-09-23 RX ORDER — SEMAGLUTIDE 1.34 MG/ML
INJECTION, SOLUTION SUBCUTANEOUS
Qty: 3 ML | Refills: 0 | OUTPATIENT
Start: 2024-09-23

## 2024-09-25 DIAGNOSIS — E11.65 TYPE 2 DIABETES MELLITUS WITH HYPERGLYCEMIA, WITHOUT LONG-TERM CURRENT USE OF INSULIN (HCC): ICD-10-CM

## 2024-09-25 RX ORDER — DOXEPIN HYDROCHLORIDE 10 MG/1
CAPSULE ORAL
Qty: 30 CAPSULE | Refills: 5 | Status: SHIPPED | OUTPATIENT
Start: 2024-09-25

## 2024-09-25 RX ORDER — SEMAGLUTIDE 1.34 MG/ML
INJECTION, SOLUTION SUBCUTANEOUS
Qty: 3 ML | Refills: 5 | Status: SHIPPED | OUTPATIENT
Start: 2024-09-25

## 2024-10-11 ENCOUNTER — OFFICE VISIT (OUTPATIENT)
Dept: PRIMARY CARE CLINIC | Facility: CLINIC | Age: 64
End: 2024-10-11
Payer: COMMERCIAL

## 2024-10-11 VITALS
HEIGHT: 64 IN | TEMPERATURE: 97.3 F | SYSTOLIC BLOOD PRESSURE: 120 MMHG | WEIGHT: 154.8 LBS | BODY MASS INDEX: 26.43 KG/M2 | HEART RATE: 68 BPM | OXYGEN SATURATION: 97 % | DIASTOLIC BLOOD PRESSURE: 46 MMHG

## 2024-10-11 DIAGNOSIS — Z79.899 ENCOUNTER FOR LONG-TERM (CURRENT) USE OF MEDICATIONS: ICD-10-CM

## 2024-10-11 DIAGNOSIS — E78.2 MIXED HYPERLIPIDEMIA: ICD-10-CM

## 2024-10-11 DIAGNOSIS — I10 ESSENTIAL HYPERTENSION: ICD-10-CM

## 2024-10-11 DIAGNOSIS — E11.9 CONTROLLED TYPE 2 DIABETES MELLITUS WITHOUT COMPLICATION, WITHOUT LONG-TERM CURRENT USE OF INSULIN (HCC): Primary | ICD-10-CM

## 2024-10-11 DIAGNOSIS — Z23 FLU VACCINE NEED: ICD-10-CM

## 2024-10-11 PROCEDURE — 99213 OFFICE O/P EST LOW 20 MIN: CPT | Performed by: FAMILY MEDICINE

## 2024-10-11 PROCEDURE — 3078F DIAST BP <80 MM HG: CPT | Performed by: FAMILY MEDICINE

## 2024-10-11 PROCEDURE — 3074F SYST BP LT 130 MM HG: CPT | Performed by: FAMILY MEDICINE

## 2024-10-11 PROCEDURE — 90661 CCIIV3 VAC ABX FR 0.5 ML IM: CPT | Performed by: FAMILY MEDICINE

## 2024-10-11 PROCEDURE — 90471 IMMUNIZATION ADMIN: CPT | Performed by: FAMILY MEDICINE

## 2024-10-11 PROCEDURE — 3052F HG A1C>EQUAL 8.0%<EQUAL 9.0%: CPT | Performed by: FAMILY MEDICINE

## 2024-10-11 RX ORDER — EZETIMIBE 10 MG/1
10 TABLET ORAL DAILY
Qty: 30 TABLET | Refills: 5 | Status: SHIPPED | OUTPATIENT
Start: 2024-10-11

## 2024-10-11 RX ORDER — METOPROLOL SUCCINATE 50 MG/1
50 TABLET, EXTENDED RELEASE ORAL DAILY
Qty: 90 TABLET | Refills: 3 | Status: SHIPPED | OUTPATIENT
Start: 2024-10-11

## 2024-10-11 NOTE — PROGRESS NOTES
[facility]  Kaity Gonzalez M.D.  [address] [place] [address]  Phone:  [phone]  Fax:  [phone]    CHIEF COMPLAINT:  Chief Complaint   Patient presents with    Follow-up     Pt presents today for 6 months follow up, no recent labs.     Diabetes     Need refills of metformin    Hypertension     Need refill of metoprolol    Hyperlipidemia     Need refills of zetia     Flu Vaccine     Pt would like her flu shot during todays visit.         HISTORY OF PRESENT ILLNESS:  Ms. Mccall is a 64 y.o. female  who presents for follow up. The patient, Iris Mccall, reports that her blood pressure is better today, with no chest pain or shortness of breath. She denies experiencing dizziness. She has not had any headaches for the past few days.  She mentions that she is on a fixed income and has concerns about the cost of her medications, specifically Zetia.     Her blood sugars have been under control. She takes Metformin 1000 mg twice daily. Her last A1c was 9.0.    Her blood pressure is under control. She takes Metoprolol XL 50 mg daily. Denies chest pain, shortness of breath, headaches or blurred vision.    No other complaints. Taking medications as prescribed. Medications reviewed and updated.     HISTORY:  No Known Allergies    REVIEW OF SYSTEMS:  Review of systems is as indicated in HPI otherwise negative.    PHYSICAL EXAM:  Visit Vitals  BP (!) 120/46 (Site: Left Upper Arm, Position: Sitting)   Pulse 68   Temp 97.3 °F (36.3 °C) (Temporal)   Ht 1.626 m (5' 4\")   Wt 70.2 kg (154 lb 12.8 oz)   SpO2 97%   BMI 26.57 kg/m²        Physical Exam  Vitals and nursing note reviewed.   Constitutional:       Appearance: Normal appearance.   HENT:      Head: Normocephalic and atraumatic.      Nose: Nose normal.      Mouth/Throat:      Mouth: Mucous membranes are moist.   Eyes:      Extraocular Movements: Extraocular movements intact.      Pupils: Pupils are equal, round, and reactive to light.

## 2024-12-06 ENCOUNTER — OFFICE VISIT (OUTPATIENT)
Dept: UROLOGY | Age: 64
End: 2024-12-06
Payer: COMMERCIAL

## 2024-12-06 DIAGNOSIS — N39.0 RECURRENT UTI: Primary | ICD-10-CM

## 2024-12-06 DIAGNOSIS — N20.0 KIDNEY STONE: ICD-10-CM

## 2024-12-06 LAB
BILIRUBIN, URINE, POC: NEGATIVE
BLOOD URINE, POC: NEGATIVE
GLUCOSE URINE, POC: NEGATIVE MG/DL
KETONES, URINE, POC: NEGATIVE MG/DL
LEUKOCYTE ESTERASE, URINE, POC: NEGATIVE
NITRITE, URINE, POC: NEGATIVE
PH, URINE, POC: 5.5 (ref 4.6–8)
PROTEIN,URINE, POC: NEGATIVE MG/DL
SPECIFIC GRAVITY, URINE, POC: 1.03 (ref 1–1.03)
URINALYSIS CLARITY, POC: NORMAL
URINALYSIS COLOR, POC: NORMAL
UROBILINOGEN, POC: NORMAL MG/DL

## 2024-12-06 PROCEDURE — 81003 URINALYSIS AUTO W/O SCOPE: CPT | Performed by: NURSE PRACTITIONER

## 2024-12-06 PROCEDURE — 74018 RADEX ABDOMEN 1 VIEW: CPT | Performed by: NURSE PRACTITIONER

## 2024-12-06 PROCEDURE — 99214 OFFICE O/P EST MOD 30 MIN: CPT | Performed by: NURSE PRACTITIONER

## 2024-12-06 RX ORDER — TRAMADOL HYDROCHLORIDE 50 MG/1
50 TABLET ORAL EVERY 6 HOURS PRN
Qty: 20 TABLET | Refills: 0 | Status: SHIPPED | OUTPATIENT
Start: 2024-12-06 | End: 2024-12-11

## 2024-12-06 RX ORDER — TAMSULOSIN HYDROCHLORIDE 0.4 MG/1
0.4 CAPSULE ORAL DAILY
Qty: 14 CAPSULE | Refills: 0 | Status: SHIPPED | OUTPATIENT
Start: 2024-12-06

## 2024-12-06 NOTE — PROGRESS NOTES
[facility]  [address]  [place] [address]  [phone]    Iris Mccall  : 1960    Chief Complaint   Patient presents with    Follow-up    Urinary Tract Infection          HPI     Iris Mccall is a 64 y.o. female  w hx of DM2 and breast CA followed for kidney stones and recurrent UTI. She had 2 positive E.Coli urine cultures in Aug and 2022. Saw a urologist in [place]. Reported she cysto approx 4-5 yrs ago that showed \"inflammation\". Has tried D-mannose and estrogen cream wo success.  MARIAA Oct 2022 revealed a indeterminate left renal cyst. Keflex suppression started . Klebsiella UTI in  despite antibiotic suppression. Cystoscopy in  by Dr. Frank showed incomplete bladder emptying w large bladder capacity. CT A/P hematuria protocol showed non obstructing left renal stones. No longer on estrogen cream due to hx of breast CA. No further UTI since . She cont w keflex.      Began to have gross hematuria and left flank pain in . Sx better on day of visit. Afebrile. UA shows no infection/moderate blood. KUB in office reviewed by myself and shows no stone. Offered further eval w CT however she opted to take flomax and monitor sx.      Went to ER on 23 w fever with the highest at 102. Work-up in the ER revealed an obstructing 6mm left distal ureteral stone with hydronephrosis. Underwent stent placement on 23 by Dr. Fatima. Urine culture saccharomyces cerevisiae. BCX from 9/16 x2 with saccharomyces cerevisiase. ID consulted. Repeat BCX  negative.  Pt on eraxis. ID suspects that the stone is the nidus and infection will persist until stone and stent removed. She was dc home on fluconazole per ID recs. Sensitivity came back showing susceptibility to fluconazole.      Back to OR on 10/5/23 for Left URS w stent exchange by Dr. Frank.  A 6 mm distal left ureteral stone fragmented and removed. Stent removed

## 2024-12-19 ENCOUNTER — TELEPHONE (OUTPATIENT)
Dept: UROLOGY | Age: 64
End: 2024-12-19

## 2024-12-19 ENCOUNTER — NURSE ONLY (OUTPATIENT)
Dept: UROLOGY | Age: 64
End: 2024-12-19
Payer: COMMERCIAL

## 2024-12-19 DIAGNOSIS — N39.0 RECURRENT UTI: Primary | ICD-10-CM

## 2024-12-19 LAB
BILIRUBIN, URINE, POC: NEGATIVE
BLOOD URINE, POC: NORMAL
GLUCOSE URINE, POC: NEGATIVE MG/DL
KETONES, URINE, POC: NEGATIVE MG/DL
LEUKOCYTE ESTERASE, URINE, POC: NORMAL
NITRITE, URINE, POC: NEGATIVE
PH, URINE, POC: 5.5 (ref 4.6–8)
PROTEIN,URINE, POC: 30 MG/DL
SPECIFIC GRAVITY, URINE, POC: 1.03 (ref 1–1.03)
URINALYSIS CLARITY, POC: NORMAL
URINALYSIS COLOR, POC: NORMAL
UROBILINOGEN, POC: NORMAL MG/DL

## 2024-12-19 PROCEDURE — 81003 URINALYSIS AUTO W/O SCOPE: CPT | Performed by: NURSE PRACTITIONER

## 2024-12-19 RX ORDER — CEPHALEXIN 500 MG/1
500 CAPSULE ORAL 3 TIMES DAILY
Qty: 21 CAPSULE | Refills: 0 | Status: SHIPPED | OUTPATIENT
Start: 2024-12-19

## 2024-12-19 NOTE — TELEPHONE ENCOUNTER
SX 2 kidney stones. Blood in urine & leukocytes + with home test strip   Pt will be in today to have spec done.

## 2024-12-19 NOTE — PROGRESS NOTES
Pt SX:  Hematuria  Results for orders placed or performed in visit on 12/19/24   AMB POC URINALYSIS DIP STICK AUTO W/O MICRO   Result Value Ref Range    Color (UA POC)      Clarity (UA POC)      Glucose, Urine, POC Negative Negative mg/dL    Bilirubin, Urine, POC Negative Negative    KETONES, Urine, POC Negative Negative mg/dL    Specific Gravity, Urine, POC 1.030 1.001 - 1.035    Blood (UA POC) Moderate     pH, Urine, POC 5.5 4.6 - 8.0    Protein, Urine, POC 30 Negative mg/dL    Urobilinogen, POC 0.2 mg/dL <1.1 mg/dL    Nitrite, Urine, POC Negative Negative    Leukocyte Esterase, Urine, POC Small Negative     Send ucx. Begin keflex. Hold daily keflex. Will call results.   Marge Hair, APRN - CNP

## 2024-12-21 LAB
BACTERIA SPEC CULT: ABNORMAL
BACTERIA SPEC CULT: ABNORMAL
SERVICE CMNT-IMP: ABNORMAL

## 2025-03-02 DIAGNOSIS — E11.65 TYPE 2 DIABETES MELLITUS WITH HYPERGLYCEMIA, WITHOUT LONG-TERM CURRENT USE OF INSULIN (HCC): ICD-10-CM

## 2025-03-03 RX ORDER — SEMAGLUTIDE 1.34 MG/ML
INJECTION, SOLUTION SUBCUTANEOUS
Qty: 3 ML | Refills: 0 | OUTPATIENT
Start: 2025-03-03

## 2025-04-04 ENCOUNTER — LAB (OUTPATIENT)
Dept: PRIMARY CARE CLINIC | Facility: CLINIC | Age: 65
End: 2025-04-04

## 2025-04-04 DIAGNOSIS — Z13.21 SCREENING FOR ENDOCRINE, NUTRITIONAL, METABOLIC AND IMMUNITY DISORDER: ICD-10-CM

## 2025-04-04 DIAGNOSIS — Z13.228 SCREENING FOR ENDOCRINE, NUTRITIONAL, METABOLIC AND IMMUNITY DISORDER: ICD-10-CM

## 2025-04-04 DIAGNOSIS — Z13.29 SCREENING FOR ENDOCRINE, METABOLIC AND IMMUNITY DISORDER: ICD-10-CM

## 2025-04-04 DIAGNOSIS — R79.9 ABNORMAL BLOOD CHEMISTRY: ICD-10-CM

## 2025-04-04 DIAGNOSIS — R79.89 OTHER SPECIFIED ABNORMAL FINDINGS OF BLOOD CHEMISTRY: ICD-10-CM

## 2025-04-04 DIAGNOSIS — Z79.899 ENCOUNTER FOR LONG-TERM (CURRENT) USE OF MEDICATIONS: ICD-10-CM

## 2025-04-04 DIAGNOSIS — E55.9 VITAMIN D DEFICIENCY: ICD-10-CM

## 2025-04-04 DIAGNOSIS — Z13.1 SCREENING FOR DIABETES MELLITUS: Primary | ICD-10-CM

## 2025-04-04 DIAGNOSIS — R53.82 CHRONIC FATIGUE: ICD-10-CM

## 2025-04-04 DIAGNOSIS — Z13.228 SCREENING FOR METABOLIC DISORDER: ICD-10-CM

## 2025-04-04 DIAGNOSIS — Z13.0 SCREENING FOR ENDOCRINE, METABOLIC AND IMMUNITY DISORDER: ICD-10-CM

## 2025-04-04 DIAGNOSIS — Z13.21 ENCOUNTER FOR VITAMIN DEFICIENCY SCREENING: ICD-10-CM

## 2025-04-04 DIAGNOSIS — Z13.0 SCREENING FOR DEFICIENCY ANEMIA: ICD-10-CM

## 2025-04-04 DIAGNOSIS — Z13.220 SCREENING FOR LIPID DISORDERS: ICD-10-CM

## 2025-04-04 DIAGNOSIS — Z13.29 SCREENING FOR ENDOCRINE, NUTRITIONAL, METABOLIC AND IMMUNITY DISORDER: ICD-10-CM

## 2025-04-04 DIAGNOSIS — Z13.228 SCREENING FOR ENDOCRINE, METABOLIC AND IMMUNITY DISORDER: ICD-10-CM

## 2025-04-04 DIAGNOSIS — Z13.0 SCREENING FOR ENDOCRINE, NUTRITIONAL, METABOLIC AND IMMUNITY DISORDER: ICD-10-CM

## 2025-04-04 DIAGNOSIS — Z13.1 SCREENING FOR DIABETES MELLITUS: ICD-10-CM

## 2025-04-04 DIAGNOSIS — R73.09 OTHER ABNORMAL GLUCOSE: ICD-10-CM

## 2025-04-04 DIAGNOSIS — R53.81 MALAISE: ICD-10-CM

## 2025-04-04 LAB
25(OH)D3 SERPL-MCNC: 91.4 NG/ML (ref 30–100)
ALBUMIN SERPL-MCNC: 3.8 G/DL (ref 3.2–4.6)
ALBUMIN/GLOB SERPL: 1.1 (ref 1–1.9)
ALP SERPL-CCNC: 71 U/L (ref 35–104)
ALT SERPL-CCNC: 26 U/L (ref 8–45)
ANION GAP SERPL CALC-SCNC: 11 MMOL/L (ref 7–16)
AST SERPL-CCNC: 29 U/L (ref 15–37)
BASOPHILS # BLD: 0.05 K/UL (ref 0–0.2)
BASOPHILS NFR BLD: 0.8 % (ref 0–2)
BILIRUB SERPL-MCNC: 0.5 MG/DL (ref 0–1.2)
BUN SERPL-MCNC: 14 MG/DL (ref 8–23)
CALCIUM SERPL-MCNC: 9.5 MG/DL (ref 8.8–10.2)
CHLORIDE SERPL-SCNC: 104 MMOL/L (ref 98–107)
CHOLEST SERPL-MCNC: 141 MG/DL (ref 0–200)
CO2 SERPL-SCNC: 25 MMOL/L (ref 20–29)
CREAT SERPL-MCNC: 0.81 MG/DL (ref 0.6–1.1)
CREAT UR-MCNC: 160 MG/DL (ref 28–217)
DIFFERENTIAL METHOD BLD: ABNORMAL
EOSINOPHIL # BLD: 0.06 K/UL (ref 0–0.8)
EOSINOPHIL NFR BLD: 1 % (ref 0.5–7.8)
ERYTHROCYTE [DISTWIDTH] IN BLOOD BY AUTOMATED COUNT: 12.3 % (ref 11.9–14.6)
EST. AVERAGE GLUCOSE BLD GHB EST-MCNC: 169 MG/DL
GLOBULIN SER CALC-MCNC: 3.5 G/DL (ref 2.3–3.5)
GLUCOSE SERPL-MCNC: 112 MG/DL (ref 70–99)
HBA1C MFR BLD: 7.5 % (ref 0–5.6)
HCT VFR BLD AUTO: 40.2 % (ref 35.8–46.3)
HDLC SERPL-MCNC: 45 MG/DL (ref 40–60)
HDLC SERPL: 3.1 (ref 0–5)
HGB BLD-MCNC: 13.1 G/DL (ref 11.7–15.4)
IMM GRANULOCYTES # BLD AUTO: 0.02 K/UL (ref 0–0.5)
IMM GRANULOCYTES NFR BLD AUTO: 0.3 % (ref 0–5)
LDLC SERPL CALC-MCNC: 73 MG/DL (ref 0–100)
LYMPHOCYTES # BLD: 3.03 K/UL (ref 0.5–4.6)
LYMPHOCYTES NFR BLD: 50.2 % (ref 13–44)
MCH RBC QN AUTO: 31 PG (ref 26.1–32.9)
MCHC RBC AUTO-ENTMCNC: 32.6 G/DL (ref 31.4–35)
MCV RBC AUTO: 95 FL (ref 82–102)
MICROALBUMIN UR-MCNC: 2.28 MG/DL (ref 0–20)
MICROALBUMIN/CREAT UR-RTO: 14 MG/G (ref 0–30)
MONOCYTES # BLD: 0.47 K/UL (ref 0.1–1.3)
MONOCYTES NFR BLD: 7.8 % (ref 4–12)
NEUTS SEG # BLD: 2.41 K/UL (ref 1.7–8.2)
NEUTS SEG NFR BLD: 39.9 % (ref 43–78)
NRBC # BLD: 0 K/UL (ref 0–0.2)
PLATELET # BLD AUTO: 185 K/UL (ref 150–450)
PMV BLD AUTO: 9.9 FL (ref 9.4–12.3)
POTASSIUM SERPL-SCNC: 4.4 MMOL/L (ref 3.5–5.1)
PROT SERPL-MCNC: 7.3 G/DL (ref 6.3–8.2)
RBC # BLD AUTO: 4.23 M/UL (ref 4.05–5.2)
SODIUM SERPL-SCNC: 140 MMOL/L (ref 136–145)
TRIGL SERPL-MCNC: 116 MG/DL (ref 0–150)
VLDLC SERPL CALC-MCNC: 23 MG/DL (ref 6–23)
WBC # BLD AUTO: 6 K/UL (ref 4.3–11.1)

## 2025-04-08 ENCOUNTER — RESULTS FOLLOW-UP (OUTPATIENT)
Dept: PRIMARY CARE CLINIC | Facility: CLINIC | Age: 65
End: 2025-04-08

## 2025-04-08 SDOH — ECONOMIC STABILITY: FOOD INSECURITY: WITHIN THE PAST 12 MONTHS, YOU WORRIED THAT YOUR FOOD WOULD RUN OUT BEFORE YOU GOT MONEY TO BUY MORE.: NEVER TRUE

## 2025-04-08 SDOH — ECONOMIC STABILITY: FOOD INSECURITY: WITHIN THE PAST 12 MONTHS, THE FOOD YOU BOUGHT JUST DIDN'T LAST AND YOU DIDN'T HAVE MONEY TO GET MORE.: NEVER TRUE

## 2025-04-08 SDOH — ECONOMIC STABILITY: INCOME INSECURITY: IN THE LAST 12 MONTHS, WAS THERE A TIME WHEN YOU WERE NOT ABLE TO PAY THE MORTGAGE OR RENT ON TIME?: NO

## 2025-04-08 SDOH — ECONOMIC STABILITY: TRANSPORTATION INSECURITY
IN THE PAST 12 MONTHS, HAS THE LACK OF TRANSPORTATION KEPT YOU FROM MEDICAL APPOINTMENTS OR FROM GETTING MEDICATIONS?: NO

## 2025-04-08 ASSESSMENT — PATIENT HEALTH QUESTIONNAIRE - PHQ9
1. LITTLE INTEREST OR PLEASURE IN DOING THINGS: NOT AT ALL
SUM OF ALL RESPONSES TO PHQ QUESTIONS 1-9: 0
SUM OF ALL RESPONSES TO PHQ QUESTIONS 1-9: 0
2. FEELING DOWN, DEPRESSED OR HOPELESS: NOT AT ALL
2. FEELING DOWN, DEPRESSED OR HOPELESS: NOT AT ALL
SUM OF ALL RESPONSES TO PHQ QUESTIONS 1-9: 0
SUM OF ALL RESPONSES TO PHQ QUESTIONS 1-9: 0
SUM OF ALL RESPONSES TO PHQ9 QUESTIONS 1 & 2: 0
1. LITTLE INTEREST OR PLEASURE IN DOING THINGS: NOT AT ALL

## 2025-04-11 ENCOUNTER — OFFICE VISIT (OUTPATIENT)
Dept: PRIMARY CARE CLINIC | Facility: CLINIC | Age: 65
End: 2025-04-11
Payer: COMMERCIAL

## 2025-04-11 VITALS
HEIGHT: 64 IN | HEART RATE: 65 BPM | WEIGHT: 152.3 LBS | TEMPERATURE: 98.3 F | SYSTOLIC BLOOD PRESSURE: 129 MMHG | BODY MASS INDEX: 26 KG/M2 | OXYGEN SATURATION: 98 % | DIASTOLIC BLOOD PRESSURE: 74 MMHG

## 2025-04-11 DIAGNOSIS — F51.01 PRIMARY INSOMNIA: ICD-10-CM

## 2025-04-11 DIAGNOSIS — G47.62 NOCTURNAL LEG CRAMPS: ICD-10-CM

## 2025-04-11 DIAGNOSIS — Z79.899 ENCOUNTER FOR LONG-TERM (CURRENT) USE OF MEDICATIONS: ICD-10-CM

## 2025-04-11 DIAGNOSIS — E78.2 MIXED HYPERLIPIDEMIA: ICD-10-CM

## 2025-04-11 DIAGNOSIS — E11.9 CONTROLLED TYPE 2 DIABETES MELLITUS WITHOUT COMPLICATION, WITHOUT LONG-TERM CURRENT USE OF INSULIN: Primary | ICD-10-CM

## 2025-04-11 DIAGNOSIS — I10 ESSENTIAL HYPERTENSION: ICD-10-CM

## 2025-04-11 DIAGNOSIS — M25.551 RIGHT HIP PAIN: ICD-10-CM

## 2025-04-11 DIAGNOSIS — M54.31 RIGHT SCIATIC NERVE PAIN: ICD-10-CM

## 2025-04-11 PROCEDURE — 3051F HG A1C>EQUAL 7.0%<8.0%: CPT | Performed by: FAMILY MEDICINE

## 2025-04-11 PROCEDURE — 3078F DIAST BP <80 MM HG: CPT | Performed by: FAMILY MEDICINE

## 2025-04-11 PROCEDURE — 99214 OFFICE O/P EST MOD 30 MIN: CPT | Performed by: FAMILY MEDICINE

## 2025-04-11 PROCEDURE — 3074F SYST BP LT 130 MM HG: CPT | Performed by: FAMILY MEDICINE

## 2025-04-11 RX ORDER — SEMAGLUTIDE 1.34 MG/ML
1 INJECTION, SOLUTION SUBCUTANEOUS
Qty: 3 ML | Refills: 5 | Status: CANCELLED | OUTPATIENT
Start: 2025-04-11

## 2025-04-11 RX ORDER — EZETIMIBE 10 MG/1
10 TABLET ORAL DAILY
Qty: 30 TABLET | Refills: 5 | Status: SHIPPED | OUTPATIENT
Start: 2025-04-11

## 2025-04-11 RX ORDER — DOXEPIN HYDROCHLORIDE 10 MG/1
10 CAPSULE ORAL NIGHTLY
Qty: 90 CAPSULE | Refills: 3 | Status: SHIPPED | OUTPATIENT
Start: 2025-04-11

## 2025-04-11 RX ORDER — METOPROLOL SUCCINATE 50 MG/1
50 TABLET, EXTENDED RELEASE ORAL DAILY
Qty: 90 TABLET | Refills: 3 | Status: SHIPPED | OUTPATIENT
Start: 2025-04-11

## 2025-04-11 NOTE — PROGRESS NOTES
mellitus without complication, without long-term current use of insulin  metFORMIN (GLUCOPHAGE) 1000 MG tablet    semaglutide, 2 MG/DOSE, (OZEMPIC) 8 MG/3ML SOPN sc injection      2. Essential hypertension  metoprolol succinate (TOPROL XL) 50 MG extended release tablet      3. Mixed hyperlipidemia  ezetimibe (ZETIA) 10 MG tablet      4. Primary insomnia  doxepin (SINEQUAN) 10 MG capsule      5. Right hip pain  [facility]      6. Right sciatic nerve pain  [facility]      7. Nocturnal leg cramps        8. Encounter for long-term (current) use of medications            Follow up and Dispositions:  Return in about 6 months (around 10/11/2025) for LABS BEFORE NEXT APPOINTMENT.     Patient will continue current medications.  Refilled the above medications.  Will change the following medications: will increase Ozempic to 2 mg weekly. If too strong, will go back to 1 mg weekly.  Reviewed medications and side effects in detail.  Will check labs before next visit.  Reviewed most recent labs.  Reviewed diet, exercise and weight control.  Cardiovascular risks and recommendations reviewed.  Patient encouraged to follow a diabetic/low sodium diet.  Use of aspirin to prevent MIs and TIAs discussed.   Patient will check blood sugars 2x daily.    I have reviewed the patient's past medical history, social history and family history and vitals.  We have discussed treatment plan and follow up and given patient instructions.  Patient's questions are answered and we will follow up as indicated.      Kaity Gonzalez MD    Dictated using voice recognition software. Proofread, but unrecognized voice recognition errors may exist.

## 2025-04-14 ENCOUNTER — OFFICE VISIT (OUTPATIENT)
Age: 65
End: 2025-04-14
Payer: COMMERCIAL

## 2025-04-14 VITALS — BODY MASS INDEX: 28.89 KG/M2 | HEIGHT: 61 IN | WEIGHT: 153 LBS

## 2025-04-14 DIAGNOSIS — M43.16 SPONDYLOLISTHESIS OF LUMBAR REGION: ICD-10-CM

## 2025-04-14 DIAGNOSIS — M47.816 LUMBAR FACET ARTHROPATHY: ICD-10-CM

## 2025-04-14 DIAGNOSIS — M51.360 DEGENERATION OF INTERVERTEBRAL DISC OF LUMBAR REGION WITH DISCOGENIC BACK PAIN: ICD-10-CM

## 2025-04-14 DIAGNOSIS — M54.50 LOW BACK PAIN, UNSPECIFIED BACK PAIN LATERALITY, UNSPECIFIED CHRONICITY, UNSPECIFIED WHETHER SCIATICA PRESENT: Primary | ICD-10-CM

## 2025-04-14 DIAGNOSIS — M16.11 PRIMARY OSTEOARTHRITIS OF RIGHT HIP: ICD-10-CM

## 2025-04-14 PROCEDURE — 99204 OFFICE O/P NEW MOD 45 MIN: CPT | Performed by: NURSE PRACTITIONER

## 2025-04-14 RX ORDER — CYCLOBENZAPRINE HCL 10 MG
10 TABLET ORAL 3 TIMES DAILY PRN
Qty: 60 TABLET | Refills: 0 | Status: SHIPPED | OUTPATIENT
Start: 2025-04-14 | End: 2025-05-04

## 2025-04-14 NOTE — PROGRESS NOTES
Name: Iris Mccall  YOB: 1960  Gender: female  MRN: [id number]    CC: Low back pain, right buttock/hip pain    HPI: This is a 64 y.o. year old female who has had a longstanding history of back pain.  She states that it is increased over the past few years.  She complains of primarily a spasm or burning sensation in the right very lower back into the buttock and just to the lateral side of the hip.  She can get some gripping sensation in the anterior thigh primarily in the upper portion.  She denies significant groin pain.  She does have limited range of motion of the hips bilaterally.      This patient  has not had lumbar surgery in the past.     Thus far, the patient has tried : Muscle relaxers in the past  Current pain level: 2-6  Activities limited by pain: Increased activities, standing for long periods of time           4/14/2025     1:30 PM   AMB PAIN ASSESSMENT   Location of Pain Back   Location Modifiers Right   Severity of Pain 2   Limiting Behavior Yes   Result of Injury No   Work-Related Injury No   Are there other pain locations you wish to document? No            ROS/Meds/PSH/PMH/FH/SH: I personally reviewed the patient's collected intake data.  Below are the pertinents: History of breast cancer, gastric bypass, cervical surgery    No Known Allergies      Current Outpatient Medications:     cyclobenzaprine (FLEXERIL) 10 MG tablet, Take 1 tablet by mouth 3 times daily as needed for Muscle spasms, Disp: 60 tablet, Rfl: 0    doxepin (SINEQUAN) 10 MG capsule, Take 1 capsule by mouth nightly, Disp: 90 capsule, Rfl: 3    ezetimibe (ZETIA) 10 MG tablet, Take 1 tablet by mouth daily, Disp: 30 tablet, Rfl: 5    metFORMIN (GLUCOPHAGE) 1000 MG tablet, Take 1 tablet by mouth in the morning and 1 tablet in the evening., Disp: 180 tablet, Rfl: 3    metoprolol succinate (TOPROL XL) 50 MG extended release tablet, Take 1 tablet by mouth daily, Disp: 90 tablet, Rfl: 3    semaglutide, 2 MG/DOSE,

## 2025-04-21 ENCOUNTER — OFFICE VISIT (OUTPATIENT)
Dept: ORTHOPEDIC SURGERY | Age: 65
End: 2025-04-21
Payer: COMMERCIAL

## 2025-04-21 DIAGNOSIS — M17.12 PRIMARY OSTEOARTHRITIS OF LEFT KNEE: ICD-10-CM

## 2025-04-21 DIAGNOSIS — M16.11 OSTEOARTHRITIS OF RIGHT HIP, UNSPECIFIED OSTEOARTHRITIS TYPE: ICD-10-CM

## 2025-04-21 DIAGNOSIS — G89.29 CHRONIC BILATERAL LOW BACK PAIN, UNSPECIFIED WHETHER SCIATICA PRESENT: Primary | ICD-10-CM

## 2025-04-21 DIAGNOSIS — M54.50 CHRONIC BILATERAL LOW BACK PAIN, UNSPECIFIED WHETHER SCIATICA PRESENT: Primary | ICD-10-CM

## 2025-04-21 PROCEDURE — 99215 OFFICE O/P EST HI 40 MIN: CPT | Performed by: ORTHOPAEDIC SURGERY

## 2025-04-21 NOTE — PROGRESS NOTES
nature of the procedure the preoperative preparation necessary postop recovery and expected outcome.    I counseled them regarding the risks of surgery including risk of infection, DVT formation, loss of motion, dislocation and stiffness.    This patient has significant factors which may increase their surgical risk which include: and History of significant lumbar spinal issues which may complicate recovery and increased risk for persistent postoperative pain.    We discussed time return to work , general activity and long-term expectations.    I described the [facility] program for the patient and expected time for hospitalization. This patient has good family support and meets criteria for consideration for the [facility] outpatient program.    I would plan a Depuy Actis CORBY. Conventional Instrumentation  I discussed my implant selection process and rationale with the patient.  We addressed her right hip first her left hip at a later date per protocol.    Patient would like to consider options, if they would like to proceed with surgery they will let us know.      If so, it will be a category 1 in technical complexity.  This reflects my expectation for surgical time and difficulty but does not indicate the overall complexity of the patient's care.    Plan:       Follow up:   Return for Surgery.     SARAVANAN POZO MD        Elements of this note have been dictated using speech recognition software. As a result, errors of speech recognition may have occurred.

## 2025-05-28 ENCOUNTER — TELEPHONE (OUTPATIENT)
Dept: UROLOGY | Age: 65
End: 2025-05-28

## 2025-06-20 ENCOUNTER — OFFICE VISIT (OUTPATIENT)
Dept: UROLOGY | Age: 65
End: 2025-06-20
Payer: COMMERCIAL

## 2025-06-20 DIAGNOSIS — N20.0 KIDNEY STONE: ICD-10-CM

## 2025-06-20 DIAGNOSIS — N39.0 RECURRENT UTI: Primary | ICD-10-CM

## 2025-06-20 PROCEDURE — 99214 OFFICE O/P EST MOD 30 MIN: CPT | Performed by: NURSE PRACTITIONER

## 2025-06-20 PROCEDURE — 81003 URINALYSIS AUTO W/O SCOPE: CPT | Performed by: NURSE PRACTITIONER

## 2025-06-20 ASSESSMENT — ENCOUNTER SYMPTOMS: BACK PAIN: 0

## 2025-06-20 NOTE — PROGRESS NOTES
Urine, POC 5.5 4.6 - 8.0    Protein, Urine, POC Trace Negative    Urobilinogen, POC Normal     Nitrite, Urine, POC Negative Negative    Leukocyte Esterase, Urine, POC Negative Negative       PHYSICAL EXAM    General appearance - well appearing and in no distress  Mental status - alert, oriented to person, place, and time  Neck - supple, no significant adenopathy  Chest/Lung-  Quiet, even and easy respiratory effort without use of accessory muscles  Skin - normal coloration and turgor, no rashes      Assessment and Plan    ICD-10-CM    1. Recurrent UTI  N39.0 AMB POC URINALYSIS DIP STICK AUTO W/O MICRO     cephALEXin (KEFLEX) 250 MG capsule      2. Kidney stone  N20.0 AMB POC URINALYSIS DIP STICK AUTO W/O MICRO      She would like to cont w keflex. Will try to reduce to MWF. I have educated patient to behavioral changes that can decrease the frequency of any urinary infection.  These include eliminating constipation, front to back wiping, frequent voiding to keep bladder volumes low, double voiding, avoid soaking in water (including baths, pools, hot tubs), avoiding tight fitting clothes, use of cranberry products, and use of probiotics. I encouraged consuming minimum of 64 oz of water daily. I also recommend avoiding consumption of sugary beverages and other known bladder irritants.     No stone sx today. KUB deferred. Will get at next OV. I have educated pt. about diet modifications that can decrease the risk of future calcium stone formation.  These include decreasing things high in oxalate such as teas and dangelo.  Also, I have encouraged pt. to increase clear liquid intake, especially H2O.  Advised the recommended water consumption is 3 liter per day. Things high in citrate such as lemon juice should also be increased.    Will plan to have pt return for recheck prior to upcoming surgery. To call sooner if needed.     Marge Hair, APRN - CNP  Dr. Fatima is supervising physician today and he approves plan of 
1

## 2025-08-07 ENCOUNTER — HOSPITAL ENCOUNTER (OUTPATIENT)
Dept: PHYSICAL THERAPY | Age: 65
Setting detail: RECURRING SERIES
Discharge: HOME | End: 2025-08-10
Payer: COMMERCIAL

## 2025-08-07 DIAGNOSIS — M16.11 OSTEOARTHRITIS OF RIGHT HIP, UNSPECIFIED OSTEOARTHRITIS TYPE: Primary | ICD-10-CM

## 2025-08-07 DIAGNOSIS — M25.551 RIGHT HIP PAIN: ICD-10-CM

## 2025-08-07 DIAGNOSIS — R26.2 DIFFICULTY IN WALKING, NOT ELSEWHERE CLASSIFIED: ICD-10-CM

## 2025-08-07 DIAGNOSIS — M62.81 MUSCLE WEAKNESS (GENERALIZED): ICD-10-CM

## 2025-08-07 PROCEDURE — 97161 PT EVAL LOW COMPLEX 20 MIN: CPT

## 2025-08-07 PROCEDURE — 97110 THERAPEUTIC EXERCISES: CPT

## 2025-08-27 ENCOUNTER — HOSPITAL ENCOUNTER (OUTPATIENT)
Dept: PHYSICAL THERAPY | Age: 65
Setting detail: RECURRING SERIES
Discharge: HOME | End: 2025-08-30
Payer: COMMERCIAL

## 2025-08-27 PROCEDURE — 97110 THERAPEUTIC EXERCISES: CPT

## 2025-08-29 DIAGNOSIS — M16.11 OSTEOARTHRITIS OF RIGHT HIP, UNSPECIFIED OSTEOARTHRITIS TYPE: Primary | ICD-10-CM

## (undated) DEVICE — GUIDEWIRE UROLOGICAL STR 3 CM 0.038 INX150 CM DUAL-FLEX

## (undated) DEVICE — GOWN,REINFORCED,POLY,AURORA,XXLARGE,STR: Brand: MEDLINE

## (undated) DEVICE — PACK SURGICAL PROCEDURE KIT CYSTOSCOPY TOTE

## (undated) DEVICE — SOLUTION IRRIG 3000ML H2O STRL BAG

## (undated) DEVICE — GLOVE ORANGE PI 8 1/2   MSG9085